# Patient Record
Sex: FEMALE | Race: WHITE | Employment: UNEMPLOYED | URBAN - METROPOLITAN AREA
[De-identification: names, ages, dates, MRNs, and addresses within clinical notes are randomized per-mention and may not be internally consistent; named-entity substitution may affect disease eponyms.]

---

## 2018-06-02 ENCOUNTER — HOSPITAL ENCOUNTER (EMERGENCY)
Age: 58
Discharge: HOME | End: 2018-06-02
Payer: COMMERCIAL

## 2018-06-02 ENCOUNTER — APPOINTMENT (OUTPATIENT)
Dept: GENERAL RADIOLOGY | Age: 58
End: 2018-06-02
Payer: COMMERCIAL

## 2018-06-02 VITALS
TEMPERATURE: 97.3 F | OXYGEN SATURATION: 98 % | WEIGHT: 100 LBS | HEART RATE: 94 BPM | DIASTOLIC BLOOD PRESSURE: 84 MMHG | SYSTOLIC BLOOD PRESSURE: 128 MMHG | RESPIRATION RATE: 18 BRPM

## 2018-06-02 DIAGNOSIS — S90.31XA CONTUSION OF RIGHT FOOT, INITIAL ENCOUNTER: Primary | ICD-10-CM

## 2018-06-02 PROCEDURE — 73630 X-RAY EXAM OF FOOT: CPT

## 2018-06-02 PROCEDURE — 99283 EMERGENCY DEPT VISIT LOW MDM: CPT

## 2018-06-02 RX ORDER — IBUPROFEN 400 MG/1
400 TABLET ORAL EVERY 6 HOURS PRN
Qty: 20 TABLET | Refills: 0 | Status: SHIPPED | OUTPATIENT
Start: 2018-06-02 | End: 2019-12-13

## 2018-06-02 ASSESSMENT — PAIN DESCRIPTION - LOCATION: LOCATION: FOOT

## 2018-06-02 ASSESSMENT — ENCOUNTER SYMPTOMS
VOMITING: 0
COUGH: 0
NAUSEA: 0
SHORTNESS OF BREATH: 0
DIARRHEA: 0
ABDOMINAL PAIN: 0

## 2018-06-02 ASSESSMENT — PAIN DESCRIPTION - ORIENTATION: ORIENTATION: RIGHT

## 2018-06-02 ASSESSMENT — PAIN SCALES - GENERAL: PAINLEVEL_OUTOF10: 5

## 2019-12-13 ENCOUNTER — HOSPITAL ENCOUNTER (EMERGENCY)
Age: 59
Discharge: HOME | End: 2019-12-13
Payer: COMMERCIAL

## 2019-12-13 ENCOUNTER — APPOINTMENT (OUTPATIENT)
Dept: CT IMAGING | Age: 59
End: 2019-12-13
Payer: COMMERCIAL

## 2019-12-13 VITALS
TEMPERATURE: 96.7 F | SYSTOLIC BLOOD PRESSURE: 201 MMHG | HEIGHT: 62 IN | HEART RATE: 78 BPM | DIASTOLIC BLOOD PRESSURE: 125 MMHG | RESPIRATION RATE: 20 BRPM | WEIGHT: 100 LBS | BODY MASS INDEX: 18.4 KG/M2 | OXYGEN SATURATION: 100 %

## 2019-12-13 DIAGNOSIS — S09.90XA CLOSED HEAD INJURY, INITIAL ENCOUNTER: Primary | ICD-10-CM

## 2019-12-13 DIAGNOSIS — S01.81XA FACIAL LACERATION, INITIAL ENCOUNTER: ICD-10-CM

## 2019-12-13 PROCEDURE — 72125 CT NECK SPINE W/O DYE: CPT

## 2019-12-13 PROCEDURE — 12013 RPR F/E/E/N/L/M 2.6-5.0 CM: CPT

## 2019-12-13 PROCEDURE — 99284 EMERGENCY DEPT VISIT MOD MDM: CPT

## 2019-12-13 PROCEDURE — 6370000000 HC RX 637 (ALT 250 FOR IP): Performed by: PERSONAL EMERGENCY RESPONSE ATTENDANT

## 2019-12-13 PROCEDURE — 70486 CT MAXILLOFACIAL W/O DYE: CPT

## 2019-12-13 PROCEDURE — 70450 CT HEAD/BRAIN W/O DYE: CPT

## 2019-12-13 RX ORDER — CEFAZOLIN SODIUM 2 G/50ML
2 SOLUTION INTRAVENOUS ONCE
Status: DISCONTINUED | OUTPATIENT
Start: 2019-12-13 | End: 2019-12-13

## 2019-12-13 RX ORDER — DIAPER,BRIEF,INFANT-TODD,DISP
EACH MISCELLANEOUS ONCE
Status: COMPLETED | OUTPATIENT
Start: 2019-12-13 | End: 2019-12-13

## 2019-12-13 RX ORDER — CEPHALEXIN 500 MG/1
1000 CAPSULE ORAL 2 TIMES DAILY
Qty: 28 CAPSULE | Refills: 0 | Status: SHIPPED | OUTPATIENT
Start: 2019-12-13 | End: 2019-12-20

## 2019-12-13 RX ADMIN — BACITRACIN ZINC 1 G: 500 OINTMENT TOPICAL at 02:41

## 2019-12-13 ASSESSMENT — ENCOUNTER SYMPTOMS
RHINORRHEA: 0
SORE THROAT: 0
SHORTNESS OF BREATH: 0
COLOR CHANGE: 0
BLOOD IN STOOL: 0
NAUSEA: 0
COUGH: 0
ABDOMINAL PAIN: 0
VOMITING: 0
DIARRHEA: 0

## 2019-12-20 ENCOUNTER — HOSPITAL ENCOUNTER (EMERGENCY)
Age: 59
Discharge: HOME | End: 2019-12-20
Payer: COMMERCIAL

## 2019-12-20 VITALS
RESPIRATION RATE: 19 BRPM | BODY MASS INDEX: 17.67 KG/M2 | WEIGHT: 90 LBS | HEIGHT: 60 IN | SYSTOLIC BLOOD PRESSURE: 178 MMHG | DIASTOLIC BLOOD PRESSURE: 97 MMHG | OXYGEN SATURATION: 99 % | TEMPERATURE: 98.1 F | HEART RATE: 90 BPM

## 2019-12-20 DIAGNOSIS — Z48.02 VISIT FOR SUTURE REMOVAL: Primary | ICD-10-CM

## 2019-12-20 PROCEDURE — 99281 EMR DPT VST MAYX REQ PHY/QHP: CPT

## 2019-12-20 ASSESSMENT — ENCOUNTER SYMPTOMS: BACK PAIN: 0

## 2021-02-06 ENCOUNTER — HOSPITAL ENCOUNTER (INPATIENT)
Age: 61
LOS: 4 days | Discharge: HOME HEALTH | DRG: 426 | End: 2021-02-10
Attending: INTERNAL MEDICINE | Admitting: INTERNAL MEDICINE
Payer: COMMERCIAL

## 2021-02-06 ENCOUNTER — APPOINTMENT (OUTPATIENT)
Dept: CT IMAGING | Age: 61
DRG: 426 | End: 2021-02-06
Payer: COMMERCIAL

## 2021-02-06 DIAGNOSIS — E87.1 HYPONATREMIA: Primary | ICD-10-CM

## 2021-02-06 DIAGNOSIS — E87.6 HYPOKALEMIA: ICD-10-CM

## 2021-02-06 DIAGNOSIS — R40.4 TRANSIENT ALTERATION OF AWARENESS: ICD-10-CM

## 2021-02-06 LAB
ALBUMIN SERPL-MCNC: 4 G/DL (ref 3.5–4.6)
ALP BLD-CCNC: 113 U/L (ref 40–130)
ALT SERPL-CCNC: 40 U/L (ref 0–33)
ANION GAP SERPL CALCULATED.3IONS-SCNC: 12 MEQ/L (ref 9–15)
APTT: 32.3 SEC (ref 24.4–36.8)
AST SERPL-CCNC: 72 U/L (ref 0–35)
BASOPHILS ABSOLUTE: 0 K/UL (ref 0–0.2)
BASOPHILS RELATIVE PERCENT: 0.3 %
BILIRUB SERPL-MCNC: 0.9 MG/DL (ref 0.2–0.7)
BILIRUBIN URINE: NEGATIVE
BLOOD, URINE: NEGATIVE
BUN BLDV-MCNC: 3 MG/DL (ref 8–23)
CALCIUM SERPL-MCNC: 9.3 MG/DL (ref 8.5–9.9)
CHLORIDE BLD-SCNC: 83 MEQ/L (ref 95–107)
CLARITY: CLEAR
CO2: 25 MEQ/L (ref 20–31)
COLOR: YELLOW
CREAT SERPL-MCNC: 0.37 MG/DL (ref 0.5–0.9)
EKG ATRIAL RATE: 82 BPM
EKG P AXIS: 69 DEGREES
EKG P-R INTERVAL: 148 MS
EKG Q-T INTERVAL: 404 MS
EKG QRS DURATION: 76 MS
EKG QTC CALCULATION (BAZETT): 472 MS
EKG R AXIS: 82 DEGREES
EKG T AXIS: 21 DEGREES
EKG VENTRICULAR RATE: 82 BPM
EOSINOPHILS ABSOLUTE: 0 K/UL (ref 0–0.7)
EOSINOPHILS RELATIVE PERCENT: 0.1 %
ETHANOL PERCENT: NORMAL G/DL
ETHANOL: <10 MG/DL (ref 0–0.08)
GFR AFRICAN AMERICAN: >60
GFR NON-AFRICAN AMERICAN: >60
GLOBULIN: 3.9 G/DL (ref 2.3–3.5)
GLUCOSE BLD-MCNC: 76 MG/DL (ref 70–99)
GLUCOSE URINE: NEGATIVE MG/DL
HCT VFR BLD CALC: 40.3 % (ref 37–47)
HEMOGLOBIN: 14.4 G/DL (ref 12–16)
INR BLD: 0.9
KETONES, URINE: 40 MG/DL
LACTIC ACID: 1 MMOL/L (ref 0.5–2.2)
LEUKOCYTE ESTERASE, URINE: NEGATIVE
LIPASE: 30 U/L (ref 12–95)
LYMPHOCYTES ABSOLUTE: 1.1 K/UL (ref 1–4.8)
LYMPHOCYTES RELATIVE PERCENT: 15.9 %
MAGNESIUM: 1.7 MG/DL (ref 1.7–2.4)
MCH RBC QN AUTO: 34.1 PG (ref 27–31.3)
MCHC RBC AUTO-ENTMCNC: 35.9 % (ref 33–37)
MCV RBC AUTO: 95.1 FL (ref 82–100)
MONOCYTES ABSOLUTE: 1 K/UL (ref 0.2–0.8)
MONOCYTES RELATIVE PERCENT: 14.3 %
NEUTROPHILS ABSOLUTE: 4.6 K/UL (ref 1.4–6.5)
NEUTROPHILS RELATIVE PERCENT: 69.4 %
NITRITE, URINE: NEGATIVE
PDW BLD-RTO: 12.7 % (ref 11.5–14.5)
PH UA: 7 (ref 5–9)
PLATELET # BLD: 372 K/UL (ref 130–400)
POC CREATININE WHOLE BLOOD: 0.4
POTASSIUM SERPL-SCNC: 3 MEQ/L (ref 3.4–4.9)
PROTEIN UA: ABNORMAL MG/DL
PROTHROMBIN TIME: 11.7 SEC (ref 12.3–14.9)
RBC # BLD: 4.24 M/UL (ref 4.2–5.4)
SODIUM BLD-SCNC: 120 MEQ/L (ref 135–144)
SPECIFIC GRAVITY UA: 1.01 (ref 1–1.03)
TOTAL PROTEIN: 7.9 G/DL (ref 6.3–8)
URINE REFLEX TO CULTURE: ABNORMAL
UROBILINOGEN, URINE: 0.2 E.U./DL
WBC # BLD: 6.6 K/UL (ref 4.8–10.8)

## 2021-02-06 PROCEDURE — 85730 THROMBOPLASTIN TIME PARTIAL: CPT

## 2021-02-06 PROCEDURE — 81003 URINALYSIS AUTO W/O SCOPE: CPT

## 2021-02-06 PROCEDURE — 6360000004 HC RX CONTRAST MEDICATION: Performed by: PHYSICIAN ASSISTANT

## 2021-02-06 PROCEDURE — 86900 BLOOD TYPING SEROLOGIC ABO: CPT

## 2021-02-06 PROCEDURE — 83605 ASSAY OF LACTIC ACID: CPT

## 2021-02-06 PROCEDURE — 99283 EMERGENCY DEPT VISIT LOW MDM: CPT

## 2021-02-06 PROCEDURE — C9113 INJ PANTOPRAZOLE SODIUM, VIA: HCPCS | Performed by: PHYSICIAN ASSISTANT

## 2021-02-06 PROCEDURE — 80053 COMPREHEN METABOLIC PANEL: CPT

## 2021-02-06 PROCEDURE — 82077 ASSAY SPEC XCP UR&BREATH IA: CPT

## 2021-02-06 PROCEDURE — 6370000000 HC RX 637 (ALT 250 FOR IP): Performed by: PHYSICIAN ASSISTANT

## 2021-02-06 PROCEDURE — 93005 ELECTROCARDIOGRAM TRACING: CPT | Performed by: PHYSICIAN ASSISTANT

## 2021-02-06 PROCEDURE — 2580000003 HC RX 258: Performed by: PHYSICIAN ASSISTANT

## 2021-02-06 PROCEDURE — 86901 BLOOD TYPING SEROLOGIC RH(D): CPT

## 2021-02-06 PROCEDURE — 83690 ASSAY OF LIPASE: CPT

## 2021-02-06 PROCEDURE — 1210000000 HC MED SURG R&B

## 2021-02-06 PROCEDURE — 74177 CT ABD & PELVIS W/CONTRAST: CPT

## 2021-02-06 PROCEDURE — 36415 COLL VENOUS BLD VENIPUNCTURE: CPT

## 2021-02-06 PROCEDURE — 85610 PROTHROMBIN TIME: CPT

## 2021-02-06 PROCEDURE — 86850 RBC ANTIBODY SCREEN: CPT

## 2021-02-06 PROCEDURE — 96374 THER/PROPH/DIAG INJ IV PUSH: CPT

## 2021-02-06 PROCEDURE — 6360000002 HC RX W HCPCS: Performed by: PHYSICIAN ASSISTANT

## 2021-02-06 PROCEDURE — 85025 COMPLETE CBC W/AUTO DIFF WBC: CPT

## 2021-02-06 PROCEDURE — 83735 ASSAY OF MAGNESIUM: CPT

## 2021-02-06 RX ORDER — 0.9 % SODIUM CHLORIDE 0.9 %
1000 INTRAVENOUS SOLUTION INTRAVENOUS ONCE
Status: COMPLETED | OUTPATIENT
Start: 2021-02-06 | End: 2021-02-06

## 2021-02-06 RX ORDER — PANTOPRAZOLE SODIUM 40 MG/10ML
40 INJECTION, POWDER, LYOPHILIZED, FOR SOLUTION INTRAVENOUS ONCE
Status: COMPLETED | OUTPATIENT
Start: 2021-02-06 | End: 2021-02-06

## 2021-02-06 RX ADMIN — IOPAMIDOL 100 ML: 612 INJECTION, SOLUTION INTRAVENOUS at 22:35

## 2021-02-06 RX ADMIN — SODIUM CHLORIDE 1000 ML: 9 INJECTION, SOLUTION INTRAVENOUS at 22:02

## 2021-02-06 RX ADMIN — PANTOPRAZOLE SODIUM 40 MG: 40 INJECTION, POWDER, FOR SOLUTION INTRAVENOUS at 22:02

## 2021-02-06 RX ADMIN — POTASSIUM BICARBONATE 40 MEQ: 782 TABLET, EFFERVESCENT ORAL at 23:45

## 2021-02-06 ASSESSMENT — ENCOUNTER SYMPTOMS
VOMITING: 0
ALLERGIC/IMMUNOLOGIC NEGATIVE: 1
COLOR CHANGE: 0
ABDOMINAL PAIN: 0
BLOOD IN STOOL: 1
NAUSEA: 0
EYE PAIN: 0
TROUBLE SWALLOWING: 0
SHORTNESS OF BREATH: 0
APNEA: 0

## 2021-02-07 ENCOUNTER — APPOINTMENT (OUTPATIENT)
Dept: GENERAL RADIOLOGY | Age: 61
DRG: 426 | End: 2021-02-07
Payer: COMMERCIAL

## 2021-02-07 ENCOUNTER — APPOINTMENT (OUTPATIENT)
Dept: ULTRASOUND IMAGING | Age: 61
DRG: 426 | End: 2021-02-07
Payer: COMMERCIAL

## 2021-02-07 PROBLEM — R74.8 ELEVATED LIVER ENZYMES: Status: ACTIVE | Noted: 2021-02-07

## 2021-02-07 PROBLEM — I10 HTN (HYPERTENSION): Status: ACTIVE | Noted: 2021-02-07

## 2021-02-07 PROBLEM — E87.6 HYPOKALEMIA: Status: ACTIVE | Noted: 2021-02-07

## 2021-02-07 PROBLEM — Z78.9 ALCOHOL USE: Status: ACTIVE | Noted: 2021-02-07

## 2021-02-07 PROBLEM — F10.90 ALCOHOL USE: Status: ACTIVE | Noted: 2021-02-07

## 2021-02-07 LAB
ABO/RH: NORMAL
ALBUMIN SERPL-MCNC: 3.5 G/DL (ref 3.5–4.6)
ALP BLD-CCNC: 94 U/L (ref 40–130)
ALT SERPL-CCNC: 33 U/L (ref 0–33)
ANION GAP SERPL CALCULATED.3IONS-SCNC: 12 MEQ/L (ref 9–15)
ANION GAP SERPL CALCULATED.3IONS-SCNC: 7 MEQ/L (ref 9–15)
ANION GAP SERPL CALCULATED.3IONS-SCNC: 9 MEQ/L (ref 9–15)
ANTIBODY SCREEN: NORMAL
AST SERPL-CCNC: 55 U/L (ref 0–35)
BILIRUB SERPL-MCNC: 0.8 MG/DL (ref 0.2–0.7)
BUN BLDV-MCNC: 4 MG/DL (ref 8–23)
BUN BLDV-MCNC: 6 MG/DL (ref 8–23)
BUN BLDV-MCNC: 6 MG/DL (ref 8–23)
CALCIUM SERPL-MCNC: 8.3 MG/DL (ref 8.5–9.9)
CALCIUM SERPL-MCNC: 8.5 MG/DL (ref 8.5–9.9)
CALCIUM SERPL-MCNC: 8.8 MG/DL (ref 8.5–9.9)
CHLORIDE BLD-SCNC: 92 MEQ/L (ref 95–107)
CHLORIDE BLD-SCNC: 93 MEQ/L (ref 95–107)
CHLORIDE BLD-SCNC: 95 MEQ/L (ref 95–107)
CHP ED QC CHECK: YES
CO2: 22 MEQ/L (ref 20–31)
CO2: 24 MEQ/L (ref 20–31)
CO2: 24 MEQ/L (ref 20–31)
CREAT SERPL-MCNC: 0.38 MG/DL (ref 0.5–0.9)
CREAT SERPL-MCNC: 0.4 MG/DL (ref 0.5–0.9)
CREAT SERPL-MCNC: 0.66 MG/DL (ref 0.5–0.9)
CREATININE URINE: 53.8 MG/DL
CREATININE URINE: 82.6 MG/DL
GFR AFRICAN AMERICAN: >60
GFR NON-AFRICAN AMERICAN: >60
GLOBULIN: 3.5 G/DL (ref 2.3–3.5)
GLUCOSE BLD-MCNC: 111 MG/DL (ref 70–99)
GLUCOSE BLD-MCNC: 70 MG/DL (ref 70–99)
GLUCOSE BLD-MCNC: 74 MG/DL
GLUCOSE BLD-MCNC: 74 MG/DL (ref 60–115)
GLUCOSE BLD-MCNC: 99 MG/DL (ref 70–99)
HAV IGM SER IA-ACNC: NORMAL
HEPATITIS B CORE IGM ANTIBODY: NORMAL
HEPATITIS B SURFACE ANTIGEN INTERPRETATION: NORMAL
HEPATITIS C ANTIBODY INTERPRETATION: NORMAL
HEPATITIS INTERPRETATION:: NORMAL
OSMOLALITY URINE: 381 MOSM/KG (ref 300–900)
PERFORMED ON: ABNORMAL
PERFORMED ON: NORMAL
POC CREATININE: 0.4 MG/DL (ref 0.6–1.2)
POC SAMPLE TYPE: ABNORMAL
POTASSIUM REFLEX MAGNESIUM: 3.8 MEQ/L (ref 3.4–4.9)
POTASSIUM SERPL-SCNC: 3.4 MEQ/L (ref 3.4–4.9)
POTASSIUM SERPL-SCNC: 3.8 MEQ/L (ref 3.4–4.9)
SARS-COV-2, NAAT: NOT DETECTED
SODIUM BLD-SCNC: 126 MEQ/L (ref 135–144)
SODIUM URINE: 41 MEQ/L
SODIUM URINE: 51 MEQ/L
TOTAL PROTEIN: 7 G/DL (ref 6.3–8)

## 2021-02-07 PROCEDURE — 82542 COL CHROMOTOGRAPHY QUAL/QUAN: CPT

## 2021-02-07 PROCEDURE — 2580000003 HC RX 258

## 2021-02-07 PROCEDURE — 1210000000 HC MED SURG R&B

## 2021-02-07 PROCEDURE — 6360000002 HC RX W HCPCS: Performed by: NURSE PRACTITIONER

## 2021-02-07 PROCEDURE — 82570 ASSAY OF URINE CREATININE: CPT

## 2021-02-07 PROCEDURE — 36415 COLL VENOUS BLD VENIPUNCTURE: CPT

## 2021-02-07 PROCEDURE — 71045 X-RAY EXAM CHEST 1 VIEW: CPT

## 2021-02-07 PROCEDURE — 83935 ASSAY OF URINE OSMOLALITY: CPT

## 2021-02-07 PROCEDURE — 80074 ACUTE HEPATITIS PANEL: CPT

## 2021-02-07 PROCEDURE — 80053 COMPREHEN METABOLIC PANEL: CPT

## 2021-02-07 PROCEDURE — U0002 COVID-19 LAB TEST NON-CDC: HCPCS

## 2021-02-07 PROCEDURE — 2580000003 HC RX 258: Performed by: NURSE PRACTITIONER

## 2021-02-07 PROCEDURE — 84300 ASSAY OF URINE SODIUM: CPT

## 2021-02-07 PROCEDURE — 6370000000 HC RX 637 (ALT 250 FOR IP): Performed by: NURSE PRACTITIONER

## 2021-02-07 PROCEDURE — 76705 ECHO EXAM OF ABDOMEN: CPT

## 2021-02-07 RX ORDER — LORAZEPAM 1 MG/1
3 TABLET ORAL
Status: DISCONTINUED | OUTPATIENT
Start: 2021-02-07 | End: 2021-02-10 | Stop reason: HOSPADM

## 2021-02-07 RX ORDER — LORAZEPAM 2 MG/ML
3 INJECTION INTRAMUSCULAR
Status: DISCONTINUED | OUTPATIENT
Start: 2021-02-07 | End: 2021-02-10 | Stop reason: HOSPADM

## 2021-02-07 RX ORDER — SODIUM CHLORIDE 0.9 % (FLUSH) 0.9 %
10 SYRINGE (ML) INJECTION EVERY 12 HOURS SCHEDULED
Status: DISCONTINUED | OUTPATIENT
Start: 2021-02-07 | End: 2021-02-10 | Stop reason: HOSPADM

## 2021-02-07 RX ORDER — LORAZEPAM 2 MG/ML
4 INJECTION INTRAMUSCULAR
Status: DISCONTINUED | OUTPATIENT
Start: 2021-02-07 | End: 2021-02-10 | Stop reason: HOSPADM

## 2021-02-07 RX ORDER — AMMONIUM LACTATE 12 G/100G
LOTION TOPICAL PRN
COMMUNITY

## 2021-02-07 RX ORDER — ACETAMINOPHEN 325 MG/1
650 TABLET ORAL EVERY 6 HOURS PRN
Status: DISCONTINUED | OUTPATIENT
Start: 2021-02-07 | End: 2021-02-10 | Stop reason: HOSPADM

## 2021-02-07 RX ORDER — PROMETHAZINE HYDROCHLORIDE 12.5 MG/1
12.5 TABLET ORAL EVERY 6 HOURS PRN
Status: DISCONTINUED | OUTPATIENT
Start: 2021-02-07 | End: 2021-02-10 | Stop reason: HOSPADM

## 2021-02-07 RX ORDER — LANOLIN ALCOHOL/MO/W.PET/CERES
100 CREAM (GRAM) TOPICAL DAILY
Status: DISCONTINUED | OUTPATIENT
Start: 2021-02-07 | End: 2021-02-10 | Stop reason: HOSPADM

## 2021-02-07 RX ORDER — SODIUM CHLORIDE 9 MG/ML
INJECTION, SOLUTION INTRAVENOUS CONTINUOUS
Status: DISCONTINUED | OUTPATIENT
Start: 2021-02-07 | End: 2021-02-08

## 2021-02-07 RX ORDER — FOLIC ACID 1 MG/1
1 TABLET ORAL DAILY
Status: DISCONTINUED | OUTPATIENT
Start: 2021-02-07 | End: 2021-02-10 | Stop reason: HOSPADM

## 2021-02-07 RX ORDER — LORAZEPAM 2 MG/ML
1 INJECTION INTRAMUSCULAR
Status: DISCONTINUED | OUTPATIENT
Start: 2021-02-07 | End: 2021-02-10 | Stop reason: HOSPADM

## 2021-02-07 RX ORDER — LORAZEPAM 1 MG/1
1 TABLET ORAL
Status: DISCONTINUED | OUTPATIENT
Start: 2021-02-07 | End: 2021-02-10 | Stop reason: HOSPADM

## 2021-02-07 RX ORDER — LORAZEPAM 1 MG/1
4 TABLET ORAL
Status: DISCONTINUED | OUTPATIENT
Start: 2021-02-07 | End: 2021-02-10 | Stop reason: HOSPADM

## 2021-02-07 RX ORDER — MULTIVITAMIN WITH IRON
1 TABLET ORAL DAILY
Status: DISCONTINUED | OUTPATIENT
Start: 2021-02-07 | End: 2021-02-10 | Stop reason: HOSPADM

## 2021-02-07 RX ORDER — SODIUM CHLORIDE 0.9 % (FLUSH) 0.9 %
10 SYRINGE (ML) INJECTION PRN
Status: DISCONTINUED | OUTPATIENT
Start: 2021-02-07 | End: 2021-02-10 | Stop reason: HOSPADM

## 2021-02-07 RX ORDER — AMLODIPINE BESYLATE 2.5 MG/1
2.5 TABLET ORAL DAILY
Status: ON HOLD | COMMUNITY
End: 2021-03-15 | Stop reason: HOSPADM

## 2021-02-07 RX ORDER — AMLODIPINE BESYLATE 5 MG/1
5 TABLET ORAL DAILY
Status: DISCONTINUED | OUTPATIENT
Start: 2021-02-07 | End: 2021-02-10 | Stop reason: HOSPADM

## 2021-02-07 RX ORDER — ACETAMINOPHEN 650 MG/1
650 SUPPOSITORY RECTAL EVERY 6 HOURS PRN
Status: DISCONTINUED | OUTPATIENT
Start: 2021-02-07 | End: 2021-02-10 | Stop reason: HOSPADM

## 2021-02-07 RX ORDER — LORAZEPAM 2 MG/ML
2 INJECTION INTRAMUSCULAR
Status: DISCONTINUED | OUTPATIENT
Start: 2021-02-07 | End: 2021-02-10 | Stop reason: HOSPADM

## 2021-02-07 RX ORDER — POTASSIUM CHLORIDE 7.45 MG/ML
10 INJECTION INTRAVENOUS
Status: COMPLETED | OUTPATIENT
Start: 2021-02-07 | End: 2021-02-07

## 2021-02-07 RX ORDER — ONDANSETRON 2 MG/ML
4 INJECTION INTRAMUSCULAR; INTRAVENOUS EVERY 6 HOURS PRN
Status: DISCONTINUED | OUTPATIENT
Start: 2021-02-07 | End: 2021-02-10 | Stop reason: HOSPADM

## 2021-02-07 RX ORDER — LORAZEPAM 1 MG/1
2 TABLET ORAL
Status: DISCONTINUED | OUTPATIENT
Start: 2021-02-07 | End: 2021-02-10 | Stop reason: HOSPADM

## 2021-02-07 RX ORDER — SODIUM CHLORIDE 9 MG/ML
INJECTION, SOLUTION INTRAVENOUS
Status: COMPLETED
Start: 2021-02-07 | End: 2021-02-07

## 2021-02-07 RX ADMIN — SODIUM CHLORIDE 1000 ML: 9 INJECTION, SOLUTION INTRAVENOUS at 13:25

## 2021-02-07 RX ADMIN — POTASSIUM CHLORIDE 10 MEQ: 7.46 INJECTION, SOLUTION INTRAVENOUS at 04:00

## 2021-02-07 RX ADMIN — THERA TABS 1 TABLET: TAB at 13:23

## 2021-02-07 RX ADMIN — POTASSIUM CHLORIDE 10 MEQ: 7.46 INJECTION, SOLUTION INTRAVENOUS at 02:57

## 2021-02-07 RX ADMIN — AMLODIPINE BESYLATE 5 MG: 5 TABLET ORAL at 13:23

## 2021-02-07 RX ADMIN — ENOXAPARIN SODIUM 40 MG: 40 INJECTION SUBCUTANEOUS at 13:23

## 2021-02-07 RX ADMIN — POTASSIUM CHLORIDE 10 MEQ: 7.46 INJECTION, SOLUTION INTRAVENOUS at 05:01

## 2021-02-07 RX ADMIN — Medication 100 MG: at 13:23

## 2021-02-07 RX ADMIN — POTASSIUM CHLORIDE 10 MEQ: 7.46 INJECTION, SOLUTION INTRAVENOUS at 06:08

## 2021-02-07 RX ADMIN — SODIUM CHLORIDE, PRESERVATIVE FREE 10 ML: 5 INJECTION INTRAVENOUS at 13:23

## 2021-02-07 RX ADMIN — FOLIC ACID 1 MG: 1 TABLET ORAL at 13:22

## 2021-02-07 ASSESSMENT — ENCOUNTER SYMPTOMS
ABDOMINAL PAIN: 0
VOMITING: 0
NAUSEA: 0
DIARRHEA: 0
WHEEZING: 0
SORE THROAT: 0
BLOOD IN STOOL: 1
SHORTNESS OF BREATH: 0
TROUBLE SWALLOWING: 0

## 2021-02-07 ASSESSMENT — PAIN SCALES - GENERAL: PAINLEVEL_OUTOF10: 0

## 2021-02-07 NOTE — CONSULTS
Renal consult dictated    Hyponatremia SIADH  Suspicious with hx Cervical cancer requiring chemo/radiation 23 years ago    Hypertension  Malnourished  Alcohol abuse with liver enzymes noted    Plan Saline to be started  Fluid restriction urine osm/Na+  TSH  Rp PTH r/o tumor  Gyn exam

## 2021-02-07 NOTE — ED NOTES
Pt was given food at her request and is eating and drinking. Pt was given full meal and x2 drinks.       Puneet Mcpherson RN  02/07/21 9092

## 2021-02-07 NOTE — ED TRIAGE NOTES
Patient arrived from home with c/o generalized weakness and rectal bleeding x 1 week. Patient denies taking blood thinners. Denies any pain. Patient states she has decreased appetite. Patient has history of cervical cancer. Vitals are stable.

## 2021-02-07 NOTE — CARE COORDINATION
[facility] Case Management Initial Discharge Assessment    Met with Patient to discuss discharge plan. PCP: No primary care provider on file. Date of Last Visit: one week ago    If no PCP, list provided? N/A    Discharge Planning    Living Arrangements: independently at home    Who do you live with? Boyfriend    Who helps you with your care:  family    If lives at home:     Do you have any barriers navigating in your home? no    Patient can perform ADL? Yes    Current Services (outpatient and in home) :  None    Dialysis: No    Is transportation available to get to your appointments? Yes, boyfriend or daughter    DME Equipment:  no    Respiratory equipment: None    Respiratory provider:  no     Pharmacy:  yes - [facility] on [address] with Medication Assistance Program?  No      Patient agreeable to [facility]? Yes, Company if needed [facility]    Patient agreeable to SNF/Rehab? No    Other discharge needs identified? N/A    Freedom of choice list provided with basic dialogue that supports the patient's individualized plan of care/goals and shares the quality data associated with the providers. Yes    Does Patient Have a High-Risk for Readmission Diagnosis (CHF, PN, MI, COPD)? No    If Yes,     Consult with pulmonologist? N/A   Consult with cardiologist? N/A   Cardiac Rehab referral if EF <35%? N/A   Consult with Pharmacy for medication assessment prior to discharge? N/A   Consult with Behavioral health to aid in depression, anxiety, or coping issues? N/A   Palliative Care Consult? N/A   Pulmonary Rehab order for COPD, PN, and CHF (if EF > 35%)? N/A    Does patient have a reliable scale and know how to read it (for CHF)? N/A   Nutrition consult for CHF? N/A   Respiratory therapy consult that includes bedside instruction on administration of nebulizers and/or inhalers, and assessment of oxygen and equipment needs in the home?  N/A    The plan for Transition of Care is related to the following treatment goals: to home with boyfriend and possibly Chacha Rao. Initial Discharge Plan? (Note: please see concurrent daily documentation for any updates after initial note). FROM HOME WITH BOYFRIEND. PLANS TO RETURN HOME. FAMILY IS SUPPORTIVE. PT SEEMS A LITTLE CONFUSED BUT CAN ANSWER QUESTIONS APPROPRIATELY.      The Patient and/or patient representative: PT was provided with choice of any post-acute providers for care and equipment and agrees with discharge plan  Yes    Electronically signed by ANA PAULA Bro on 2/7/2021 at 10:17 AM

## 2021-02-07 NOTE — CONSULTS
[address]                      [address], [address] [facility]                                  CONSULTATION    PATIENT NAME: Ada Hedrick                      :        1960  MED REC NO:   [id number]                            ROOM:       N223  ACCOUNT NO:   [de-identified]                           ADMIT DATE: 2021  PROVIDER:     Yaya Monreal DO    CONSULT DATE:  2021    HISTORY OF PRESENT ILLNESS:  A 60-year-old frail-appearing female  admitted to the hospital through the emergency room complaining  initially of a lower GI bleed, however, not documented. The patient was  quite lethargic on her admission to the hospital.  Daughter in the  emergency room stated the patient has been having intermittent  confusion. There is no history of nausea, vomiting, or diarrhea per  patient. She is being seen for hyponatremia, does have a history of  cervical cancer for which she was treated with chemotherapy and  radiation per her history 23 years ago. She has had some weight loss in  the past year. The patient does have a history of alcohol use, liver normal liver with CT scan  enzymes in the past being elevated, and has been treated for  hypertension with Norvasc therapy. No diuretics per the patient. She  denies any nonsteroidal anti-inflammatory medication use. PAST MEDICAL HISTORY:  Hypertension, alcohol use. PAST SURGICAL HISTORY:  Denies, however, she says that a tumor was  removed from her abdomen 23 years ago. FAMILY HISTORY:  Noncontributory. MEDICATIONS:  At the time of her admission, Norvasc, [place]-Hydrin. ALLERGIES TO MEDICATIONS:  ZANTAC. REVIEW OF SYSTEMS:  Noncontributory. PHYSICAL EXAMINATION:  VITAL SIGNS:  Height 5 feet 2 inches, 80 pounds, blood pressure 145/90,  heart rate 70, respirations 16, afebrile. HEENT:  Normocephalic. Pupils equal and react to light. Facial muscles  appear to be atrophic. NECK:  Supple.   No

## 2021-02-07 NOTE — PROGRESS NOTES
Agree with plan as documented in my colleagues note from today. Patient was seen by nephrologist.  Appreciate help. Resume fluid as per nephrologist.  Recheck sodium in the morning.

## 2021-02-07 NOTE — ED NOTES
Pt asked to be taken to restroom, pt was taken via w/c to restroom and pts daughter stayed with her, pt delay in care as pts stays in restroom for a long period of time.       Ginny Vásquez RN  02/06/21 6265

## 2021-02-07 NOTE — ED NOTES
Pt ate all her meal that she requested. Noted pt has no needs at this time. Pt was advised we were awaiting room assignment.       Joann May RN  02/07/21 3618

## 2021-02-07 NOTE — H&P
[facility] MEDICINE    HISTORY AND PHYSICAL EXAM    PATIENT NAME:  Mathew Emerson    MRN:  [id number]  SERVICE DATE:  2/7/2021   SERVICE TIME:  1:29 AM    Primary Care Physician: No primary care provider on file. SUBJECTIVE  CHIEF COMPLAINT:  Weakness and fatigue. HPI:  Mathew Emerson is a 61 y.o., , female who  has a past medical history of Cancer ([facility] and [facility] that is hospitalized for hyponatremia, weakness. Her complaint is generalized weakness. She states she had some rectal bleeding in the past week that has resolved. She has not fallen or had syncopal episodes. She feels confused at times. She states she overall just does not feel well. She denies fever, chills, abdominal pain, dizziness and HA. No chest pain or respiratory complaints. PAST MEDICAL HISTORY:    Past Medical History:   Diagnosis Date    Cancer ([facility]     Cervical cancer ([facility]      PAST SURGICAL HISTORY:  History reviewed. No pertinent surgical history. FAMILY HISTORY:  History reviewed. No pertinent family history.   SOCIAL HISTORY:    Social History     Socioeconomic History    Marital status: Single     Spouse name: Not on file    Number of children: Not on file    Years of education: Not on file    Highest education level: Not on file   Occupational History    Not on file   Social Needs    Financial resource strain: Not on file    Food insecurity     Worry: Not on file     Inability: Not on file    Transportation needs     Medical: Not on file     Non-medical: Not on file   Tobacco Use    Smoking status: Current Every Day Smoker    Smokeless tobacco: Never Used   Substance and Sexual Activity    Alcohol use: Yes     Comment: occassional    Drug use: No    Sexual activity: Not on file   Lifestyle    Physical activity     Days per week: Not on file     Minutes per session: Not on file    Stress: Not on file   Relationships    Social connections     Talks on phone: Not on file     Gets together: Not on file     Attends Lutheran service: Not on file     Active member of club or organization: Not on file     Attends meetings of clubs or organizations: Not on file     Relationship status: Not on file    Intimate partner violence     Fear of current or ex partner: Not on file     Emotionally abused: Not on file     Physically abused: Not on file     Forced sexual activity: Not on file   Other Topics Concern    Not on file   Social History Narrative    Not on file     MEDICATIONS:   Prior to Admission medications    Not on File       ALLERGIES: Zantac [ranitidine hcl]    REVIEW OF SYSTEM:   Review of Systems   Constitutional: Positive for appetite change and fatigue. Negative for chills and fever. HENT: Positive for congestion. Negative for sore throat and trouble swallowing. Eyes: Negative for visual disturbance. Respiratory: Negative for shortness of breath and wheezing. Cardiovascular: Negative for chest pain, palpitations and leg swelling. Gastrointestinal: Positive for blood in stool. Negative for abdominal pain, diarrhea, nausea and vomiting. Genitourinary: Negative for flank pain and hematuria. Musculoskeletal: Negative for gait problem. Skin: Negative for rash. Neurological: Positive for weakness. Negative for seizures, syncope and headaches. Psychiatric/Behavioral: Positive for confusion. The patient is not nervous/anxious. OBJECTIVE  PHYSICAL EXAM:   Physical Exam  Constitutional:       General: She is not in acute distress. Appearance: She is ill-appearing. Comments: Thin ,  frail   HENT:      Head: Normocephalic. Nose: Nose normal.      Mouth/Throat:      Mouth: Mucous membranes are moist.   Eyes:      Conjunctiva/sclera: Conjunctivae normal.      Pupils: Pupils are equal, round, and reactive to light. Neck:      Musculoskeletal: No muscular tenderness. Vascular: No carotid bruit.    Cardiovascular:      Rate and Rhythm: Normal rate and regular rhythm. Pulses: Normal pulses. Heart sounds: No murmur. Pulmonary:      Effort: Pulmonary effort is normal. No respiratory distress. Breath sounds: No wheezing. Abdominal:      General: Abdomen is flat. Palpations: Abdomen is soft. Tenderness: There is no abdominal tenderness. Musculoskeletal:      Right lower leg: No edema. Left lower leg: No edema. Lymphadenopathy:      Cervical: No cervical adenopathy. Skin:     General: Skin is warm and dry. Capillary Refill: Capillary refill takes less than 2 seconds. Neurological:      Mental Status: She is alert and oriented to person, place, and time. Psychiatric:         Mood and Affect: Mood normal.         Behavior: Behavior normal.      Comments: Difficulty focusing. BP (!) 176/99   Pulse 83   Temp 98.4 °F (36.9 °C) (Temporal)   Resp 18   Ht 5' (1.524 m)   Wt 80 lb (36.3 kg)   LMP  (LMP Unknown)   SpO2 100%   BMI 15.62 kg/m²     DATA:     Diagnostic tests reviewed for today's visit:    Most recent labs and imaging results reviewed.      LABS:    Recent Results (from the past 24 hour(s))   Comprehensive Metabolic Panel    Collection Time: 02/06/21  9:00 PM   Result Value Ref Range    Sodium 120 (LL) 135 - 144 mEq/L    Potassium 3.0 (LL) 3.4 - 4.9 mEq/L    Chloride 83 (L) 95 - 107 mEq/L    CO2 25 20 - 31 mEq/L    Anion Gap 12 9 - 15 mEq/L    Glucose 76 70 - 99 mg/dL    BUN 3 (L) 8 - 23 mg/dL    CREATININE 0.37 (L) 0.50 - 0.90 mg/dL    GFR Non-African American >60.0 >60    GFR  >60.0 >60    Calcium 9.3 8.5 - 9.9 mg/dL    Total Protein 7.9 6.3 - 8.0 g/dL    Albumin 4.0 3.5 - 4.6 g/dL    Total Bilirubin 0.9 (H) 0.2 - 0.7 mg/dL    Alkaline Phosphatase 113 40 - 130 U/L    ALT 40 (H) 0 - 33 U/L    AST 72 (H) 0 - 35 U/L    Globulin 3.9 (H) 2.3 - 3.5 g/dL   CBC Auto Differential    Collection Time: 02/06/21  9:00 PM   Result Value Ref Range    WBC 6.6 4.8 - 10.8 K/uL    RBC 4. 24 4.20 - 5.40 M/uL    Hemoglobin 14.4 12.0 - 16.0 g/dL    Hematocrit 40.3 37.0 - 47.0 %    MCV 95.1 82.0 - 100.0 fL    MCH 34.1 (H) 27.0 - 31.3 pg    MCHC 35.9 33.0 - 37.0 %    RDW 12.7 11.5 - 14.5 %    Platelets 344 742 - 057 K/uL    Neutrophils % 69.4 %    Lymphocytes % 15.9 %    Monocytes % 14.3 %    Eosinophils % 0.1 %    Basophils % 0.3 %    Neutrophils Absolute 4.6 1.4 - 6.5 K/uL    Lymphocytes Absolute 1.1 1.0 - 4.8 K/uL    Monocytes Absolute 1.0 (H) 0.2 - 0.8 K/uL    Eosinophils Absolute 0.0 0.0 - 0.7 K/uL    Basophils Absolute 0.0 0.0 - 0.2 K/uL   Urine Reflex to Culture    Collection Time: 02/06/21  9:00 PM    Specimen: Urine, clean catch   Result Value Ref Range    Color, UA Yellow Straw/Yellow    Clarity, UA Clear Clear    Glucose, Ur Negative Negative mg/dL    Bilirubin Urine Negative Negative    Ketones, Urine 40 (A) Negative mg/dL    Specific Gravity, UA 1.010 1.005 - 1.030    Blood, Urine Negative Negative    pH, UA 7.0 5.0 - 9.0    Protein, UA TRACE (A) Negative mg/dL    Urobilinogen, Urine 0.2 <2.0 E.U./dL    Nitrite, Urine Negative Negative    Leukocyte Esterase, Urine Negative Negative    Urine Reflex to Culture Not Indicated    Lipase    Collection Time: 02/06/21  9:00 PM   Result Value Ref Range    Lipase 30 12 - 95 U/L   Lactic Acid, Plasma    Collection Time: 02/06/21  9:00 PM   Result Value Ref Range    Lactic Acid 1.0 0.5 - 2.2 mmol/L   Protime-INR    Collection Time: 02/06/21  9:00 PM   Result Value Ref Range    Protime 11.7 (L) 12.3 - 14.9 sec    INR 0.9    APTT    Collection Time: 02/06/21  9:00 PM   Result Value Ref Range    aPTT 32.3 24.4 - 36.8 sec   TYPE AND SCREEN    Collection Time: 02/06/21  9:00 PM   Result Value Ref Range    ABO/Rh O POS     Antibody Screen NEG    Magnesium    Collection Time: 02/06/21  9:00 PM   Result Value Ref Range    Magnesium 1.7 1.7 - 2.4 mg/dL   Ethanol    Collection Time: 02/06/21  9:00 PM   Result Value Ref Range    Ethanol Lvl <10 mg/dL    Ethanol percent Not indicated G/dL   POCT CREATININE    Collection Time: 02/06/21 10:15 PM   Result Value Ref Range    POC CREATININE WHOLE BLOOD 0.4    POCT Venous    Collection Time: 02/06/21 10:15 PM   Result Value Ref Range    POC Creatinine 0.4 (L) 0.6 - 1.2 mg/dL    GFR Non-African American >60 >60    GFR  >60 >60    Sample Type FAY     Performed on SEE BELOW    EKG 12 Lead - Chest Pain    Collection Time: 02/06/21 11:43 PM   Result Value Ref Range    Ventricular Rate 82 BPM    Atrial Rate 82 BPM    P-R Interval 148 ms    QRS Duration 76 ms    Q-T Interval 404 ms    QTc Calculation (Bazett) 472 ms    P Axis 69 degrees    R Axis 82 degrees    T Axis 21 degrees   POCT Glucose    Collection Time: 02/07/21 12:01 AM   Result Value Ref Range    POC Glucose 74 60 - 115 mg/dl    Performed on ACCU-CHEK    COVID-19    Collection Time: 02/07/21 12:03 AM   Result Value Ref Range    SARS-CoV-2, NAAT Not Detected Not Detected   POCT Glucose    Collection Time: 02/07/21 12:03 AM   Result Value Ref Range    Glucose 74 mg/dL    QC OK? yes        IMAGING:  No results found. VTE Prophylaxis: low molecular weight heparin -  start    ASSESSMENT AND PLAN  Principal Problem:    Hyponatremia   Na 120    Hypokalemic as well. Daily beer drinker. No hx renal impairment per her. Has weakness, confusion, generalized malaise. Plan: admit  Restrict fluids   Consult nephrology. Urine Na, osmolality. Check  q 6 hr BNP        Hypokalemia   K 3.0  Received 40 Effer K in ED   Has muscle weakness,   No cramping or tetany. EKG is NSR  Normal EKG. Plan: Give 20 mEQ IVPB  q 6 hr. BNP       HTN    Systolics 370L - 549'I   She states she was prescribed BP meds but has not picked up prescription. No knowledge what medication. She has no HA, blurred vision, CP, edema or dizziness. Normal heart sounds   Good pulses. Plan: amlodipine 5 mg daily. Alcohol use  Daily beer drinker,    Frail. Poor appetite.   Mental confusion   Plan: [facility] protocol     re: cessation       Elevated liver enzymes   AST  72  ALT 40  Bili 0.9      Daily beer consumption. recent rectal bleeding - guaiac now negative  H/H 14 / 40  May be related to ETOH. She does complain of decreased appetite, no abdominal pain. Plan: US liver,  Hepatitis panel   ETOH cessation.         Plan of care discussed with: patient    SIGNATURE: Herman ReneeELSA overton - CNP  DATE: February 7, 2021  TIME: 1:29 AM   Bhakti Heard MD  - supervising physician

## 2021-02-07 NOTE — ED PROVIDER NOTES
[address] [facility] ED  eMERGENCYdEPARTMENT eNCOUnter      Pt Name: Joel Oneal  MRN: [id number]  [place]ag[address] 1960  Date of evaluation: 2/6/2021  Provider:Steve Duffy PA-C    CHIEF COMPLAINT       Chief Complaint   Patient presents with    Fatigue      Rectal bleeding x 1 week, not eating. HISTORY OF PRESENT ILLNESS  (Location/Symptom, Timing/Onset, Context/Setting, Quality, Duration, Modifying Factors, Severity.)   Joel Oneal is a 61 y.o. female who presents to the emergency department with complaints of generalized weakness and fatigue as well as rectal bleeding. Generalized weakness is been ongoing for the past week. Original complaint to triage was that she has had rectal bleeding over the past week however patient does admit that is been ongoing for the past 3 to 4 weeks. Patient states that she saw her doctor 2 to 3 days ago and was put on \"some pill\" and has not had bleeding since then. Denies any nausea or vomiting. Patient denies any abdominal pain. Patient denies any chest pain or shortness of breath. No headaches or dizziness. HPI    Nursing Notes were reviewed and I agree. REVIEW OF SYSTEMS    (2-9 systems for level 4, 10 or more for level 5)     Review of Systems   Constitutional: Positive for fatigue. Negative for diaphoresis and fever. HENT: Negative for hearing loss and trouble swallowing. Eyes: Negative for pain. Respiratory: Negative for apnea and shortness of breath. Cardiovascular: Negative for chest pain. Gastrointestinal: Positive for blood in stool. Negative for abdominal pain, nausea and vomiting. Endocrine: Negative. Genitourinary: Negative for hematuria. Musculoskeletal: Negative for neck pain and neck stiffness. Skin: Negative for color change. Allergic/Immunologic: Negative. Neurological: Positive for weakness. Negative for dizziness and numbness. Hematological: Negative. Psychiatric/Behavioral: Negative.     All other systems reviewed and are negative. Except as noted above the remainder of the review of systems was reviewed and negative. PAST MEDICAL HISTORY     Past Medical History:   Diagnosis Date    Cancer [facility])     Cervical cancer ([facility]          SURGICAL HISTORY     History reviewed. No pertinent surgical history. CURRENT MEDICATIONS       Previous Medications    No medications on file       ALLERGIES     Zantac [ranitidine hcl]    FAMILY HISTORY     History reviewed. No pertinent family history. SOCIAL HISTORY       Social History     Socioeconomic History    Marital status: Single     Spouse name: None    Number of children: None    Years of education: None    Highest education level: None   Occupational History    None   Social Needs    Financial resource strain: None    Food insecurity     Worry: None     Inability: None    Transportation needs     Medical: None     Non-medical: None   Tobacco Use    Smoking status: Current Every Day Smoker    Smokeless tobacco: Never Used   Substance and Sexual Activity    Alcohol use: Yes     Comment: occassional    Drug use: No    Sexual activity: None   Lifestyle    Physical activity     Days per week: None     Minutes per session: None    Stress: None   Relationships    Social connections     Talks on phone: None     Gets together: None     Attends Mandaeism service: None     Active member of club or organization: None     Attends meetings of clubs or organizations: None     Relationship status: None    Intimate partner violence     Fear of current or ex partner: None     Emotionally abused: None     Physically abused: None     Forced sexual activity: None   Other Topics Concern    None   Social History Narrative    None       SCREENINGS   NIH Stroke Scale  Interval: Baseline  Level of Consciousness (1a. ): Alert  LOC Questions (1b. ):  Answers both correctly  LOC Commands (1c. ): Performs both tasks correctly  [facility] (2. ): CALL  [place]  [facility] tel. [phone],  POTASSIUM, SODIUM results called to and read back by Chelsy MCGRATH,  02/06/2021 23:12, by Sim STARR   COVID-19   POCT VENOUS   TYPE AND SCREEN       All other labs were within normal range or not returnedas of this dictation. EMERGENCYDEPARTMENT COURSE and DIFFERENTIAL DIAGNOSIS/MDM:   Vitals:    Vitals:    02/06/21 2034 02/06/21 2202 02/06/21 2346   BP: (!) 153/100 (!) 161/95 (!) 158/101   Pulse: 88 73 78   Resp: 20  11   Temp: 98.4 °F (36.9 °C)     TempSrc: Temporal     SpO2: 100% 100% 99%   Weight: 80 lb (36.3 kg)     Height: 5' (1.524 m)         REASSESSMENT      Patient presented the emergency department with a primary complaint originally of rectal bleeding. However daughter does state that the patient has had some transient confusion over the past 2 days, generalized weakness and fatigue. Guaiac testing is negative on the patient and she does state that she has not had any rectal bleeding for the past 2 days. On further discussion, patient does admit to daily alcohol consumption. Laboratory testing reveals a hyponatremia with a sodium of 120 and a healed hypokalemia with a potassium of 3.0. Patient is alert and oriented x3 and NIH stroke scale was negative. Patient was given IV hydration with normal saline, oral potassium and given these symptoms the patient will be admitted in the hospital for further evaluation and care    MDM    PROCEDURES:    Procedures      FINAL IMPRESSION      1. Hyponatremia    2. Hypokalemia    3. Transient alteration of awareness          DISPOSITION/PLAN   DISPOSITION Admitted 02/06/2021 11:59:29 PM      PATIENT REFERRED TO:  No follow-up provider specified.     DISCHARGE MEDICATIONS:  New Prescriptions    No medications on file       (Please note that portions of this note were completed with a voice recognition program.  Efforts were made to edit the dictations but occasionally words are mis-transcribed.)    Ev Sales TIEN Del Valle, TIEN  02/07/21 0005

## 2021-02-07 NOTE — ED NOTES
Pt was taken to bathroom via w/c. Pts daughter insisted on remaining in restroom with pt. Delay in care due to pt is restroom for extended amount of time.       Gage Douglas RN  02/06/21 8395

## 2021-02-08 ENCOUNTER — APPOINTMENT (OUTPATIENT)
Dept: CT IMAGING | Age: 61
DRG: 426 | End: 2021-02-08
Payer: COMMERCIAL

## 2021-02-08 LAB
ALBUMIN SERPL-MCNC: 2.6 G/DL (ref 3.5–4.6)
ALP BLD-CCNC: 64 U/L (ref 40–130)
ALT SERPL-CCNC: 24 U/L (ref 0–33)
ANION GAP SERPL CALCULATED.3IONS-SCNC: 10 MEQ/L (ref 9–15)
ANION GAP SERPL CALCULATED.3IONS-SCNC: 7 MEQ/L (ref 9–15)
ANION GAP SERPL CALCULATED.3IONS-SCNC: 8 MEQ/L (ref 9–15)
ANION GAP SERPL CALCULATED.3IONS-SCNC: 8 MEQ/L (ref 9–15)
AST SERPL-CCNC: 38 U/L (ref 0–35)
BASOPHILS ABSOLUTE: 0 K/UL (ref 0–0.2)
BASOPHILS RELATIVE PERCENT: 0.5 %
BILIRUB SERPL-MCNC: <0.2 MG/DL (ref 0.2–0.7)
BUN BLDV-MCNC: 5 MG/DL (ref 8–23)
BUN BLDV-MCNC: 6 MG/DL (ref 8–23)
BUN BLDV-MCNC: 7 MG/DL (ref 8–23)
BUN BLDV-MCNC: 7 MG/DL (ref 8–23)
CALCIUM SERPL-MCNC: 7.7 MG/DL (ref 8.5–9.9)
CALCIUM SERPL-MCNC: 7.9 MG/DL (ref 8.5–9.9)
CALCIUM SERPL-MCNC: 7.9 MG/DL (ref 8.5–9.9)
CALCIUM SERPL-MCNC: 8.5 MG/DL (ref 8.5–9.9)
CHLORIDE BLD-SCNC: 102 MEQ/L (ref 95–107)
CHLORIDE BLD-SCNC: 92 MEQ/L (ref 95–107)
CHLORIDE BLD-SCNC: 94 MEQ/L (ref 95–107)
CHLORIDE BLD-SCNC: 94 MEQ/L (ref 95–107)
CO2: 21 MEQ/L (ref 20–31)
CO2: 23 MEQ/L (ref 20–31)
CO2: 23 MEQ/L (ref 20–31)
CO2: 26 MEQ/L (ref 20–31)
CORTISOL - AM: 7 UG/DL (ref 6.2–19.4)
CREAT SERPL-MCNC: 0.34 MG/DL (ref 0.5–0.9)
CREAT SERPL-MCNC: 0.37 MG/DL (ref 0.5–0.9)
CREAT SERPL-MCNC: 0.38 MG/DL (ref 0.5–0.9)
CREAT SERPL-MCNC: 0.41 MG/DL (ref 0.5–0.9)
EOSINOPHILS ABSOLUTE: 0 K/UL (ref 0–0.7)
EOSINOPHILS RELATIVE PERCENT: 0.5 %
FOLATE: >20 NG/ML (ref 7.3–26.1)
GFR AFRICAN AMERICAN: >60
GFR NON-AFRICAN AMERICAN: >60
GLOBULIN: 2.8 G/DL (ref 2.3–3.5)
GLUCOSE BLD-MCNC: 100 MG/DL (ref 70–99)
GLUCOSE BLD-MCNC: 104 MG/DL (ref 70–99)
GLUCOSE BLD-MCNC: 107 MG/DL (ref 70–99)
GLUCOSE BLD-MCNC: 85 MG/DL (ref 70–99)
HCT VFR BLD CALC: 33.1 % (ref 37–47)
HEMOGLOBIN: 11.4 G/DL (ref 12–16)
LYMPHOCYTES ABSOLUTE: 1.5 K/UL (ref 1–4.8)
LYMPHOCYTES RELATIVE PERCENT: 27.8 %
MAGNESIUM: 1.4 MG/DL (ref 1.7–2.4)
MCH RBC QN AUTO: 33.7 PG (ref 27–31.3)
MCHC RBC AUTO-ENTMCNC: 34.6 % (ref 33–37)
MCV RBC AUTO: 97.5 FL (ref 82–100)
MONOCYTES ABSOLUTE: 0.9 K/UL (ref 0.2–0.8)
MONOCYTES RELATIVE PERCENT: 16.1 %
NEUTROPHILS ABSOLUTE: 2.9 K/UL (ref 1.4–6.5)
NEUTROPHILS RELATIVE PERCENT: 55.1 %
PDW BLD-RTO: 12.9 % (ref 11.5–14.5)
PLATELET # BLD: 317 K/UL (ref 130–400)
POTASSIUM REFLEX MAGNESIUM: 3 MEQ/L (ref 3.4–4.9)
POTASSIUM SERPL-SCNC: 3 MEQ/L (ref 3.4–4.9)
POTASSIUM SERPL-SCNC: 3.2 MEQ/L (ref 3.4–4.9)
POTASSIUM SERPL-SCNC: 3.3 MEQ/L (ref 3.4–4.9)
POTASSIUM SERPL-SCNC: 4.3 MEQ/L (ref 3.4–4.9)
RBC # BLD: 3.39 M/UL (ref 4.2–5.4)
SODIUM BLD-SCNC: 123 MEQ/L (ref 135–144)
SODIUM BLD-SCNC: 125 MEQ/L (ref 135–144)
SODIUM BLD-SCNC: 125 MEQ/L (ref 135–144)
SODIUM BLD-SCNC: 135 MEQ/L (ref 135–144)
TOTAL PROTEIN: 5.4 G/DL (ref 6.3–8)
TSH SERPL DL<=0.05 MIU/L-ACNC: 1.59 UIU/ML (ref 0.44–3.86)
VITAMIN B-12: 218 PG/ML (ref 232–1245)
WBC # BLD: 5.3 K/UL (ref 4.8–10.8)

## 2021-02-08 PROCEDURE — 80053 COMPREHEN METABOLIC PANEL: CPT

## 2021-02-08 PROCEDURE — 70450 CT HEAD/BRAIN W/O DYE: CPT

## 2021-02-08 PROCEDURE — 90792 PSYCH DIAG EVAL W/MED SRVCS: CPT | Performed by: PSYCHIATRY & NEUROLOGY

## 2021-02-08 PROCEDURE — 6370000000 HC RX 637 (ALT 250 FOR IP): Performed by: NURSE PRACTITIONER

## 2021-02-08 PROCEDURE — 6360000002 HC RX W HCPCS: Performed by: INTERNAL MEDICINE

## 2021-02-08 PROCEDURE — 1210000000 HC MED SURG R&B

## 2021-02-08 PROCEDURE — 93010 ELECTROCARDIOGRAM REPORT: CPT | Performed by: INTERNAL MEDICINE

## 2021-02-08 PROCEDURE — 82306 VITAMIN D 25 HYDROXY: CPT

## 2021-02-08 PROCEDURE — 6370000000 HC RX 637 (ALT 250 FOR IP): Performed by: INTERNAL MEDICINE

## 2021-02-08 PROCEDURE — 83735 ASSAY OF MAGNESIUM: CPT

## 2021-02-08 PROCEDURE — 84443 ASSAY THYROID STIM HORMONE: CPT

## 2021-02-08 PROCEDURE — 85025 COMPLETE CBC W/AUTO DIFF WBC: CPT

## 2021-02-08 PROCEDURE — 82533 TOTAL CORTISOL: CPT

## 2021-02-08 PROCEDURE — 82746 ASSAY OF FOLIC ACID SERUM: CPT

## 2021-02-08 PROCEDURE — 36415 COLL VENOUS BLD VENIPUNCTURE: CPT

## 2021-02-08 PROCEDURE — 82607 VITAMIN B-12: CPT

## 2021-02-08 RX ORDER — POTASSIUM CHLORIDE 20 MEQ/1
40 TABLET, EXTENDED RELEASE ORAL ONCE
Status: COMPLETED | OUTPATIENT
Start: 2021-02-08 | End: 2021-02-08

## 2021-02-08 RX ORDER — TOLVAPTAN 15 MG/1
15 TABLET ORAL ONCE
Status: COMPLETED | OUTPATIENT
Start: 2021-02-08 | End: 2021-02-08

## 2021-02-08 RX ORDER — MAGNESIUM SULFATE IN WATER 40 MG/ML
4000 INJECTION, SOLUTION INTRAVENOUS ONCE
Status: COMPLETED | OUTPATIENT
Start: 2021-02-08 | End: 2021-02-08

## 2021-02-08 RX ADMIN — POTASSIUM CHLORIDE 40 MEQ: 20 TABLET, EXTENDED RELEASE ORAL at 11:07

## 2021-02-08 RX ADMIN — AMLODIPINE BESYLATE 5 MG: 5 TABLET ORAL at 08:10

## 2021-02-08 RX ADMIN — POTASSIUM CHLORIDE 40 MEQ: 20 TABLET, EXTENDED RELEASE ORAL at 06:35

## 2021-02-08 RX ADMIN — LORAZEPAM 1 MG: 1 TABLET ORAL at 19:59

## 2021-02-08 RX ADMIN — THERA TABS 1 TABLET: TAB at 08:10

## 2021-02-08 RX ADMIN — Medication 15 MG: at 11:07

## 2021-02-08 RX ADMIN — FOLIC ACID 1 MG: 1 TABLET ORAL at 08:10

## 2021-02-08 RX ADMIN — Medication 100 MG: at 08:10

## 2021-02-08 RX ADMIN — MAGNESIUM SULFATE HEPTAHYDRATE 4000 MG: 40 INJECTION, SOLUTION INTRAVENOUS at 11:08

## 2021-02-08 NOTE — FLOWSHEET NOTE
0809--Patient assessment complete, awake and alert; oriented to person, place, and time, can answer orientation questions appropriately, but is confused to other situations, and does not appear orientated in some context of her conversations, patient denies pain , denies n/v, denies cp & sob, call light in reach, bed in lowest position, bed alarm engaged, will continue to monitor  1324-- dr Svitlana Palma just at patient bedside talking with patient, after leaving room patient wanted devices removed to go home she states he is leaving, informed patient the doctors still want her to stay because it is not safe for her to go home at this time. She states \"you cannot hold me against my will I am leaving and need to call my family\" perfect serve sent to dr Milton Stone regarding pt interaction, dr Milton Stone would [facility] if [facility]. Perfect serve sent to gordon, awaiting response. 1412-- [facility]  [facility] not [facility], informed gayle  [phone]-- gayle notified of gordon's response, pt currently calm and relaxing, per dr Milton Stone if patient starts to act up again, pt will need to be pink slipped  2356-- spoke with Dr. Amin Spotted regarding patients large output and updated sodium level of 135, Dr. Amin Spotted okay with patient output and sodium level, telephone order received to discontinue fluids. 2251-- pt refused labs.

## 2021-02-08 NOTE — CARE COORDINATION
Per nursing report during morning rounds, the pt has been having confusion and is unsteady with her ambulation. Pt has been urinating in cups at the bedside. LSW spoke with the pt's daughter regarding these concerns and she reported that the pt has been having strange behaviors at home for several weeks. She also reported that the pt has been very weak as well. LSW to follow as pt may need a SNF at [place].
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
## 2021-02-08 NOTE — PROGRESS NOTES
Physician Progress Note    2/8/2021   1:44 PM    Name:  Georgiana Bennett  MRN:    [id number]     IP Day: 2     Admit Date: 2/6/2021  8:39 PM  PCP: No primary care provider on file. Code Status:  Full Code    Subjective:     She denies complaints at this time. She reports that prior to coming to the hospital she was very fatigued and weak and often felt like she was falling forward when she would try to stand up. She is asking to go home.     Current Facility-Administered Medications   Medication Dose Route Frequency Provider Last Rate Last Admin    magnesium sulfate 4000 mg in 100 mL IVPB premix  4,000 mg Intravenous Once Valerie Saas, DO 25 mL/hr at 02/08/21 1108 4,000 mg at 02/08/21 1108    sodium chloride flush 0.9 % injection 10 mL  10 mL Intravenous 2 times per day Amanda Salvage, APRN - CNP   10 mL at 02/07/21 1323    sodium chloride flush 0.9 % injection 10 mL  10 mL Intravenous PRN Amanda Salvage, APRN - CNP        enoxaparin (LOVENOX) injection 40 mg  40 mg Subcutaneous Daily Amanda Salvage, APRN - CNP   40 mg at 02/07/21 1323    promethazine (PHENERGAN) tablet 12.5 mg  12.5 mg Oral Q6H PRN Amanda Salvage, APRN - CNP        Or    ondansetron [facility]) injection 4 mg  4 mg Intravenous Q6H PRN Amanda Salvage, APRN - CNP        acetaminophen (TYLENOL) tablet 650 mg  650 mg Oral Q6H PRN Amanda Salvage, APRN - CNP        Or    acetaminophen (TYLENOL) suppository 650 mg  650 mg Rectal Q6H PRN Amanda Salvage, APRN - CNP        thiamine tablet 100 mg  100 mg Oral Daily Amanda Salvage, APRN - CNP   100 mg at 02/08/21 0810    multivitamin 1 tablet  1 tablet Oral Daily Amanda Salvage, APRN - CNP   1 tablet at 83/64/40 7878    folic acid (FOLVITE) tablet 1 mg  1 mg Oral Daily Amanda Salvage, APRN - CNP   1 mg at 02/08/21 0810    LORazepam (ATIVAN) tablet 1 mg  1 mg Oral Q1H PRN Amanda Salvage, APRN - CNP Or    LORazepam (ATIVAN) injection 1 mg  1 mg Intravenous Q1H PRN Rhoda Peruvian, APRN - CNP        Or    LORazepam (ATIVAN) tablet 2 mg  2 mg Oral Q1H PRN Rhoda Peruvian, APRN - CNP        Or    LORazepam (ATIVAN) injection 2 mg  2 mg Intravenous Q1H PRN Rhoda Peruvian, APRN - CNP        Or    LORazepam (ATIVAN) tablet 3 mg  3 mg Oral Q1H PRN Rhoda Peruvian, APRN - CNP        Or    LORazepam (ATIVAN) injection 3 mg  3 mg Intravenous Q1H PRN Rhoda Peruvian, APRN - CNP        Or    LORazepam (ATIVAN) tablet 4 mg  4 mg Oral Q1H PRN Rhoda Peruvian, APRN - CNP        Or    LORazepam (ATIVAN) injection 4 mg  4 mg Intravenous Q1H PRN Rhoda Peruvian, APRN - CNP        amLODIPine (NORVASC) tablet 5 mg  5 mg Oral Daily Rhoda Peruvian, APRN - CNP   5 mg at 21 0810    0.9 % sodium chloride infusion   Intravenous Continuous Virgle Rich Bescak, DO 75 mL/hr at 21 1325 1,000 mL at 21 1325       Physical Examination:      Vitals:  BP (!) 141/73   Pulse 77   Temp 97.9 °F (36.6 °C) (Oral)   Resp 18   Ht 5' (1.524 m)   Wt 80 lb (36.3 kg)   LMP  (LMP Unknown)   SpO2 98%   BMI 15.62 kg/m²   Temp (24hrs), Av.9 °F (36.6 °C), Min:97.9 °F (36.6 °C), Max:97.9 °F (36.6 °C)      General appearance: Chronically ill-appearing. Poor dentition. Frail. Oriented x3, follows commands, answers questions but not capable of complex discussion.   Lungs: clear to auscultation bilaterally, normal effort  Heart: regular rate and rhythm, no murmur  Abdomen: soft, nontender, nondistended, bowel sounds present, no masses  Extremities: no edema, redness, tenderness in the calves  Skin: no gross lesions, rashes    Data:     Labs:  Recent Labs     21  2100 21  0500   WBC 6.6 5.3   HGB 14.4 11.4*    317     Recent Labs     21  2258 21  0500   * 125*  125*   K 3.3* 3.0*  3.2*  3.0*   CL 92* 94*  94*   CO2 23 21 23   BUN 6* 7*  7*   CREATININE 0.41* 0.38*  0.37*   GLUCOSE 85 100*  107*     Recent Labs     02/07/21  0604 02/08/21  0500   AST 55* 38*   ALT 33 24   BILITOT 0.8* <0.2   ALKPHOS 94 64       Assessment and Plan:        1. Hyponatremia:  -Further work-up and treatment per nephrology. S/p 1 dose of tolvaptan and currently on normal saline. 2.  Metabolic encephalopathy secondary to above with possible contribution from alcohol use disorder, underlying psychiatric disorder  -CT head- rule out old CVA  -Check B12, folate, vitamin D  -Appreciate psychiatric evaluation and capacity assessment  -PT/OT evaluation    Hypokalemia  Hypomagnesemia  Tobacco use disorder  Alcohol use disorder-she reports drinking approximately 3 days/week - 1 to 3 24 ounce [place] beers. Supplementing vitamins  Severe protein calorie malnutrition    Diet: DIET GENERAL; Daily Fluid Restriction: 1500 ml  Ppx: Lovenox  Full Code    Dispo:  Inpatient    >35 minutes in total care time    Electronically signed by Niat Root DO on 2/8/2021 at 1:44 PM

## 2021-02-08 NOTE — PROGRESS NOTES
Physician Progress Note      PATIENT:               Aamir Husain  CSN #:                  [id number]  :                       1960  ADMIT DATE:       2021 8:39 PM  DISCH DATE:  RESPONDING  PROVIDER #:        Doroteo POTTER DO          QUERY TEXT:    Pt admitted with hyponatremia . Pt noted to have change in mental status. If   possible, please document in the progress notes and discharge summary if you   are evaluating and / or treating any of the following: The medical record reflects the following:  Risk Factors: alcohol abuse, hyponatremia, HTN  Clinical Indicators: transient confusion, lethargic, ALT 40, AST 85/09/60  Treatment: folic acid, prn Ativan, multivitamin, potassium, thiamine, samsca    Bensonreyes BARRONN, RN, CDS  [phone]  Options provided:  -- Alcoholic encephalopathy  -- Hepatic encephalopathy acute  -- Hepatic encephalopathy chronic  -- Metabolic encephalopathy  -- Wernicke?s encephalopathy  -- Delirium due to, Please specify cause  -- Delirium  -- Other - I will add my own diagnosis  -- Disagree - Not applicable / Not valid  -- Disagree - Clinically unable to determine / Unknown  -- Refer to Clinical Documentation Reviewer    PROVIDER RESPONSE TEXT:    This patient has metabolic encephalopathy.     Query created by: Emily Barkley on 2021 11:04 AM      Electronically signed by:  Donny Hicks DO 2021 1:43 PM

## 2021-02-09 LAB
ALBUMIN SERPL-MCNC: 3.1 G/DL (ref 3.5–4.6)
ALP BLD-CCNC: 69 U/L (ref 40–130)
ALT SERPL-CCNC: 23 U/L (ref 0–33)
AMMONIA: 49 UMOL/L (ref 11–51)
ANION GAP SERPL CALCULATED.3IONS-SCNC: 5 MEQ/L (ref 9–15)
ANION GAP SERPL CALCULATED.3IONS-SCNC: 8 MEQ/L (ref 9–15)
AST SERPL-CCNC: 31 U/L (ref 0–35)
BASOPHILS ABSOLUTE: 0 K/UL (ref 0–0.2)
BASOPHILS RELATIVE PERCENT: 0.9 %
BILIRUB SERPL-MCNC: <0.2 MG/DL (ref 0.2–0.7)
BUN BLDV-MCNC: 10 MG/DL (ref 8–23)
BUN BLDV-MCNC: 12 MG/DL (ref 8–23)
CALCIUM SERPL-MCNC: 8.5 MG/DL (ref 8.5–9.9)
CALCIUM SERPL-MCNC: 8.9 MG/DL (ref 8.5–9.9)
CHLORIDE BLD-SCNC: 106 MEQ/L (ref 95–107)
CHLORIDE BLD-SCNC: 98 MEQ/L (ref 95–107)
CO2: 25 MEQ/L (ref 20–31)
CO2: 26 MEQ/L (ref 20–31)
CREAT SERPL-MCNC: 0.42 MG/DL (ref 0.5–0.9)
CREAT SERPL-MCNC: 0.49 MG/DL (ref 0.5–0.9)
EOSINOPHILS ABSOLUTE: 0 K/UL (ref 0–0.7)
EOSINOPHILS RELATIVE PERCENT: 0.7 %
GFR AFRICAN AMERICAN: >60
GFR AFRICAN AMERICAN: >60
GFR NON-AFRICAN AMERICAN: >60
GFR NON-AFRICAN AMERICAN: >60
GLOBULIN: 3.2 G/DL (ref 2.3–3.5)
GLUCOSE BLD-MCNC: 87 MG/DL (ref 70–99)
GLUCOSE BLD-MCNC: 97 MG/DL (ref 70–99)
HCT VFR BLD CALC: 37.3 % (ref 37–47)
HEMOGLOBIN: 12.7 G/DL (ref 12–16)
LYMPHOCYTES ABSOLUTE: 1.9 K/UL (ref 1–4.8)
LYMPHOCYTES RELATIVE PERCENT: 35 %
MCH RBC QN AUTO: 33.4 PG (ref 27–31.3)
MCHC RBC AUTO-ENTMCNC: 34.2 % (ref 33–37)
MCV RBC AUTO: 97.7 FL (ref 82–100)
MONOCYTES ABSOLUTE: 1 K/UL (ref 0.2–0.8)
MONOCYTES RELATIVE PERCENT: 17.8 %
NEUTROPHILS ABSOLUTE: 2.5 K/UL (ref 1.4–6.5)
NEUTROPHILS RELATIVE PERCENT: 45.6 %
PDW BLD-RTO: 12.9 % (ref 11.5–14.5)
PHOSPHORUS: 3.7 MG/DL (ref 2.3–4.8)
PLATELET # BLD: 364 K/UL (ref 130–400)
POTASSIUM REFLEX MAGNESIUM: 4.4 MEQ/L (ref 3.4–4.9)
POTASSIUM SERPL-SCNC: 4.2 MEQ/L (ref 3.4–4.9)
RBC # BLD: 3.81 M/UL (ref 4.2–5.4)
SODIUM BLD-SCNC: 132 MEQ/L (ref 135–144)
SODIUM BLD-SCNC: 136 MEQ/L (ref 135–144)
TOTAL PROTEIN: 6.3 G/DL (ref 6.3–8)
WBC # BLD: 5.6 K/UL (ref 4.8–10.8)

## 2021-02-09 PROCEDURE — 83921 ORGANIC ACID SINGLE QUANT: CPT

## 2021-02-09 PROCEDURE — 80053 COMPREHEN METABOLIC PANEL: CPT

## 2021-02-09 PROCEDURE — 99232 SBSQ HOSP IP/OBS MODERATE 35: CPT | Performed by: PSYCHIATRY & NEUROLOGY

## 2021-02-09 PROCEDURE — 97165 OT EVAL LOW COMPLEX 30 MIN: CPT

## 2021-02-09 PROCEDURE — 36415 COLL VENOUS BLD VENIPUNCTURE: CPT

## 2021-02-09 PROCEDURE — 1210000000 HC MED SURG R&B

## 2021-02-09 PROCEDURE — 6370000000 HC RX 637 (ALT 250 FOR IP): Performed by: NURSE PRACTITIONER

## 2021-02-09 PROCEDURE — 6360000002 HC RX W HCPCS: Performed by: NURSE PRACTITIONER

## 2021-02-09 PROCEDURE — 97161 PT EVAL LOW COMPLEX 20 MIN: CPT

## 2021-02-09 PROCEDURE — 99253 IP/OBS CNSLTJ NEW/EST LOW 45: CPT | Performed by: PSYCHIATRY & NEUROLOGY

## 2021-02-09 PROCEDURE — 84100 ASSAY OF PHOSPHORUS: CPT

## 2021-02-09 PROCEDURE — 82140 ASSAY OF AMMONIA: CPT

## 2021-02-09 PROCEDURE — 85025 COMPLETE CBC W/AUTO DIFF WBC: CPT

## 2021-02-09 RX ADMIN — FOLIC ACID 1 MG: 1 TABLET ORAL at 08:44

## 2021-02-09 RX ADMIN — Medication 100 MG: at 08:43

## 2021-02-09 RX ADMIN — THERA TABS 1 TABLET: TAB at 08:44

## 2021-02-09 RX ADMIN — AMLODIPINE BESYLATE 5 MG: 5 TABLET ORAL at 08:44

## 2021-02-09 ASSESSMENT — ENCOUNTER SYMPTOMS
TROUBLE SWALLOWING: 0
CHOKING: 0
BACK PAIN: 0
VOMITING: 0
PHOTOPHOBIA: 0
NAUSEA: 0
COLOR CHANGE: 0
SHORTNESS OF BREATH: 0

## 2021-02-09 ASSESSMENT — PAIN SCALES - GENERAL: PAINLEVEL_OUTOF10: 0

## 2021-02-09 NOTE — PROGRESS NOTES
[facility] OCCUPATIONAL THERAPY EVALUATION - ACUTE     NAME: Natalie Pradhan  : 1960 (61 y.o.)  MRN: [id number]  CODE STATUS: Full Code  [id number]    Date of Service: 2021    Patient Diagnosis(es): Hyponatremia [E87.1]   Chief Complaint   Patient presents with    Fatigue      Rectal bleeding x 1 week, not eating. Patient Active Problem List    Diagnosis Date Noted    HTN (hypertension) 2021    Hypokalemia 2021    Alcohol use 2021    Elevated liver enzymes 2021    Hyponatremia 2021        Past Medical History:   Diagnosis Date    [facility]     [facility] ([facility]     ETOH abuse      History reviewed. No pertinent surgical history. Restrictions  Restrictions/Precautions: Fall Risk(High per [employer])     Safety Devices: Safety Devices  Safety Devices in place: Yes  Type of devices: All fall risk precautions in place        Subjective  Pre Treatment Pain Screening  Pain at present: 0  Scale Used: Numeric Score  Intervention List: Patient able to continue with treatment, Patient declined any intervention    Pain Reassessment:   Pain Assessment  Patient Currently in Pain: Denies  Pain Assessment: 0-10  Pain Level: 0       Prior Level of Function:  Social/Functional History  Lives With: Significant other  Type of Home: Apartment  Home Layout: One level  Home Access: Level entry  Bathroom Shower/Tub: Tub/Shower unit  ADL Assistance: Independent(No AD)  Homemaking Assistance: Independent  Ambulation Assistance: Independent(No AD)  Transfer Assistance: Independent  Active : No  Patient's  Info: Daughter or s/o  Additional Comments: Pt highly distractible and requires frequent redirection to question; very tangential. States she and her s/o 'help each other' but they also have local adult children.     OBJECTIVE:     Orientation Status:  Orientation  Overall Orientation Status: Within Functional Limits(Pt oriented with extended time however demonstrates some confusion throughout assessment)    Observation:  Observation/Palpation  Posture: Fair  Observation: Pt. alert, distractible, speaks very quickly and tangentially    Cognition Status:  Cognition  Overall Cognitive Status: Exceptions  Arousal/Alertness: Appropriate responses to stimuli  Following Commands: Follows one step commands with increased time  Attention Span: Attends with cues to redirect, Difficulty attending to directions, Difficulty dividing attention  Memory: Decreased recall of precautions, Decreased short term memory, Decreased long term memory  Safety Judgement: Decreased awareness of need for assistance, Decreased awareness of need for safety  Problem Solving: Assistance required to generate solutions, Assistance required to identify errors made, Assistance required to correct errors made, Assistance required to implement solutions  Insights: Decreased awareness of deficits  Initiation: Requires cues for some  Sequencing: Requires cues for some  Cognition Comment: Verbal cues for safety throughout    Perception Status:  Perception  Overall Perceptual Status: WFL    Sensation Status:  Sensation  Overall Sensation Status: WFL    Vision and Hearing Status:  Vision  Vision: Impaired  Vision Exceptions: Wears glasses for distance  Hearing  Hearing: Within functional limits     ROM:   LUE AROM (degrees)  LUE AROM : WFL  Left Hand AROM (degrees)  Left Hand AROM: WFL  RUE AROM (degrees)  RUE AROM : WFL  Right Hand AROM (degrees)  Right Hand AROM: WFL    Strength:  LUE Strength  Gross LUE Strength: Exceptions to [facility]  L Hand General: 3+/5  LUE Strength Comment: 3+/5 all planes  RUE Strength  Gross RUE Strength: Exceptions to [facility]  R Hand General: 3+/5  RUE Strength Comment: 3+/5 all planes    Coordination, Tone, Quality of Movement:    Tone RUE  RUE Tone: Normotonic  Tone LUE  LUE Tone: Normotonic  Coordination  Movements Are Fluid And Coordinated: Yes    Hand Dominance:  Hand Dominance  Hand OT Role, Plan of Care  Patient Education: Educated pt. on role of acute care OT  Barriers to Learning: Decreased attention    OT Follow Up:  OT D/C RECOMMENDATIONS  REQUIRES OT FOLLOW UP: Yes       Assessment/Discharge Disposition:  Assessment: Pt is a 61year old woman from home with family who presents to [address] with the above deficits which impact her ability to safely and independently perform ADLs and IADLs. Pt. limited d/t fatigue and decreased safety awareness. Pt. would benefit from continued OT to maximize independence and safety with ADL tasks.   Performance deficits / Impairments: Decreased cognition, Decreased ADL status, Decreased endurance, Decreased safe awareness, Decreased balance  Prognosis: Good  Discharge Recommendations: Continue to assess pending progress  Decision Making: Low Complexity  History: Pt's medical history is moderately complex  Exam: Pt. has 5 performance deficits  Assistance / Modification: Pt. requires consistent supervision    Six Click Score   How much help for putting on and taking off regular lower body clothing?: A Little  How much help for Bathing?: A Little  How much help for Toileting?: A Little  How much help for putting on and taking off regular upper body clothing?: A Little  How much help for taking care of personal grooming?: None  How much help for eating meals?: None  AM-[facility] Inpatient Daily Activity Raw Score: 20  AM-[facility] Inpatient ADL T-Scale Score : 42.03  ADL Inpatient CMS 0-100% Score: 38.32    Plan:  Plan  Times per week: 1-3x  Plan weeks: Length of acute stay  Current Treatment Recommendations: Strengthening, Balance Training, Functional Mobility Training, Endurance Training, Safety Education & Training, Patient/Caregiver Education & Training, Equipment Evaluation, Education, & procurement, Home Management Training, Self-Care / ADL, Cognitive/Perceptual Training    Goals:   Patient will:    - Improve functional endurance to tolerate/complete 30 mins of ADL's  -

## 2021-02-09 NOTE — FLOWSHEET NOTE
Patients natalie Stanley is here to visit. Inquired about patient drinking history per Dr Yajaira Olivares . Noreen Stanley states his mom drinks daily at this time and it is typically 2-24 oz beers. He states she use to drink heavier in the past, however she continues to have a poor appetite. Patients natalie Stanley wanted to speak with  Álvaro Russell in regards to discharge planning. Called Ann WELLS to come to room to speak with son.

## 2021-02-09 NOTE — PROGRESS NOTES
[address] [place] NOTE       2/9/2021     Patient was seen and examined in person, Chart reviewed   Patient's case discussed with staff/team    Chief Complaint: confusion    Interim History:     Pt is doing better, able to give a better account  Alert and oriented x 3  Pt able to recognize me from yesterday visit  Pt admit to drinking 2 x 24 ounce beer 2-3 times per day  Admit to using alcohol heavily in the past  Pt denies any AH or VH  Denies any active withdrawal symptoms  Mild disorganization in her thought process which could be her baseline  No further bizarre behavior like urinating in a cup   Slept better last night    Appetite:   [x] Normal/Unchanged  [] Increased  [] Decreased      Sleep:       [x] Normal/Unchanged  [] Fair       [] Poor              Energy:    [x] Normal/Unchanged  [] Increased  [] Decreased        SI [] Present  [x] Absent    HI  []Present  [x] Absent     Aggression:  [] yes  [] no    Patient is [] able  [] unable to CONTRACT FOR SAFETY     PAST MEDICAL/PSYCHIATRIC HISTORY:   Past Medical History:   Diagnosis Date    [facility] ([facility]     Cervical cancer ([facility]     ETOH abuse        FAMILY/SOCIAL HISTORY:  History reviewed. No pertinent family history.   Social History     Socioeconomic History    Marital status: Single     Spouse name: Not on file    Number of children: Not on file    Years of education: Not on file    Highest education level: Not on file   Occupational History    Not on file   Social Needs    Financial resource strain: Not on file    Food insecurity     Worry: Not on file     Inability: Not on file    Transportation needs     Medical: Not on file     Non-medical: Not on file   Tobacco Use    Smoking status: Current Every Day Smoker    Smokeless tobacco: Never Used   Substance and Sexual Activity    Alcohol use: Yes     Comment: occassional    Drug use: No    Sexual activity: Not on file   Lifestyle    Physical activity     Days per week: Not on file     Minutes per session: Not on file    Stress: Not on file   Relationships    Social connections     Talks on phone: Not on file     Gets together: Not on file     Attends Baptism service: Not on file     Active member of club or organization: Not on file     Attends meetings of clubs or organizations: Not on file     Relationship status: Not on file    Intimate partner violence     Fear of current or ex partner: Not on file     Emotionally abused: Not on file     Physically abused: Not on file     Forced sexual activity: Not on file   Other Topics Concern    Not on file   Social History Narrative    Not on file           ROS:  [x] All negative/unchanged except if checked.  Explain positive(checked items) below:  [] Constitutional  [] Eyes  [] Ear/Nose/Mouth/Throat  [] Respiratory  [] CV  [] GI  []   [] Musculoskeletal  [] Skin/Breast  [] Neurological  [] Endocrine  [] Heme/Lymph  [] Allergic/Immunologic    Explanation:     MEDICATIONS:    Current Facility-Administered Medications:     sodium chloride flush 0.9 % injection 10 mL, 10 mL, Intravenous, 2 times per day, ELSA Childress CNP, Stopped at 02/09/21 0844    sodium chloride flush 0.9 % injection 10 mL, 10 mL, Intravenous, PRN, Daron Hammans Williams-Andrews, APRN - CNP    enoxaparin (LOVENOX) injection 40 mg, 40 mg, Subcutaneous, Daily, Daron Hammans Williams-Andrews, APRN - CNP, 40 mg at 02/07/21 1323    promethazine (PHENERGAN) tablet 12.5 mg, 12.5 mg, Oral, Q6H PRN **OR** ondansetron (ZOFRAN) injection 4 mg, 4 mg, Intravenous, Q6H PRN, Daron Hammans Williams-Andrews, APRN - CNP    acetaminophen (TYLENOL) tablet 650 mg, 650 mg, Oral, Q6H PRN **OR** acetaminophen (TYLENOL) suppository 650 mg, 650 mg, Rectal, Q6H PRN, Daron Hammans Williams-Andrews, APRN - CNP    thiamine tablet 100 mg, 100 mg, Oral, Daily, Daron Hammans Williams-Andrews, APRN - CNP, 100 mg at 02/09/21 0843    multivitamin 1 tablet, 1 tablet, Oral, Daily, ELSA Rizo CNP, 1 tablet at 87/34/19 4398    folic acid (FOLVITE) tablet 1 mg, 1 mg, Oral, Daily, ELSA Rizo CNP, 1 mg at 02/09/21 0844    LORazepam (ATIVAN) tablet 1 mg, 1 mg, Oral, Q1H PRN, 1 mg at 02/08/21 1959 **OR** LORazepam (ATIVAN) injection 1 mg, 1 mg, Intravenous, Q1H PRN **OR** LORazepam (ATIVAN) tablet 2 mg, 2 mg, Oral, Q1H PRN **OR** LORazepam (ATIVAN) injection 2 mg, 2 mg, Intravenous, Q1H PRN **OR** LORazepam (ATIVAN) tablet 3 mg, 3 mg, Oral, Q1H PRN **OR** LORazepam (ATIVAN) injection 3 mg, 3 mg, Intravenous, Q1H PRN **OR** LORazepam (ATIVAN) tablet 4 mg, 4 mg, Oral, Q1H PRN **OR** LORazepam (ATIVAN) injection 4 mg, 4 mg, Intravenous, Q1H PRN, ELSA Hartley CNP    amLODIPine (NORVASC) tablet 5 mg, 5 mg, Oral, Daily, ELSA Hartley CNP, 5 mg at 02/09/21 8990      Examination:  /71   Pulse 74   Temp 98.1 °F (36.7 °C) (Oral)   Resp 16   Ht 5' (1.524 m)   Wt 80 lb (36.3 kg)   LMP  (LMP Unknown)   SpO2 98%   BMI 15.62 kg/m²   Gait - steady  Medication side effects(SE): no    Mental Status Examination:    Level of consciousness:  within normal limits   Appearance:  fair grooming and fair hygiene  Behavior/Motor:  psychomotor retardation  Attitude toward examiner:  cooperative  Speech:  slow   Mood: anxious  Affect:  mood congruent  Thought processes:  coherent   Thought content:  Suicidal Ideation:  denies suicidal ideation  Cognition:  oriented to person, place, and time   Concentration intact  Insight fair   Judgement fair     ASSESSMENT:   Patient symptoms are:  [] Well controlled  [x] Improving  [] Worsening  [] No change      Diagnosis:   Delirium- resolving  Principal Problem:    Hyponatremia  Active Problems:    HTN (hypertension)    Hypokalemia    Alcohol use    Elevated liver enzymes  Resolved Problems:    * No resolved hospital problems.  *      LABS:    Recent Labs     02/06/21  2100 02/08/21  0504

## 2021-02-09 NOTE — CONSULTS
Subjective:      Patient ID: Terrell Bruce is a 61 y.o. female who presents today for encephalopathy. HPI 70-year-old right-handed female admitted with encephalopathy. Patient low sodium of 127. We are consulted patient has some uretic behaviors yesterday she was micturating in her plate. She is much better this morning. I had a lengthy discussion with her and I did not find any memory issues though this can fluctuate. She does have some balance issues and may harbor a neuropathy given that she has no reflexes. She denies any back pain. She otherwise functions well. It is more than likely that she may be consuming alcohol given the history and findings that we see. The daughter is not quite give me the right details on this. She does not any family members with neuropathy. The reason this question was raised that she has bitemporal wasting to make sure she does not have any Frederichs  ataxia. Patient denies any numbness or tingling    Review of Systems   Constitutional: Negative for fever. HENT: Negative for ear pain, tinnitus and trouble swallowing. Eyes: Negative for photophobia and visual disturbance. Respiratory: Negative for choking and shortness of breath. Cardiovascular: Negative for chest pain and palpitations. Gastrointestinal: Negative for nausea and vomiting. Musculoskeletal: Negative for back pain, gait problem, joint swelling, myalgias, neck pain and neck stiffness. Skin: Negative for color change. Allergic/Immunologic: Negative for food allergies. Neurological: Negative for dizziness, tremors, seizures, syncope, facial asymmetry, speech difficulty, weakness, light-headedness, numbness and headaches. Psychiatric/Behavioral: Positive for confusion and hallucinations. Negative for behavioral problems and sleep disturbance. Past Medical History:   Diagnosis Date    Cancer [facility])     Cervical [facility] ([facility]     ETOH abuse      History reviewed.  No pertinent surgical history. Social History     Socioeconomic History    Marital status: Single     Spouse name: Not on file    Number of children: Not on file    Years of education: Not on file    Highest education level: Not on file   Occupational History    Not on file   Social Needs    Financial resource strain: Not on file    Food insecurity     Worry: Not on file     Inability: Not on file    Transportation needs     Medical: Not on file     Non-medical: Not on file   Tobacco Use    Smoking status: Current Every Day Smoker    Smokeless tobacco: Never Used   Substance and Sexual Activity    Alcohol use: Yes     Comment: occassional    Drug use: No    Sexual activity: Not on file   Lifestyle    Physical activity     Days per week: Not on file     Minutes per session: Not on file    Stress: Not on file   Relationships    Social connections     Talks on phone: Not on file     Gets together: Not on file     Attends Restorationism service: Not on file     Active member of club or organization: Not on file     Attends meetings of clubs or organizations: Not on file     Relationship status: Not on file    Intimate partner violence     Fear of current or ex partner: Not on file     Emotionally abused: Not on file     Physically abused: Not on file     Forced sexual activity: Not on file   Other Topics Concern    Not on file   Social History Narrative    Not on file     History reviewed. No pertinent family history.   Allergies   Allergen Reactions    Zantac [Ranitidine Hcl]      Current Facility-Administered Medications   Medication Dose Route Frequency Provider Last Rate Last Admin    sodium chloride flush 0.9 % injection 10 mL  10 mL Intravenous 2 times per day ELSA Contreras CNP   Stopped at 02/09/21 0844    sodium chloride flush 0.9 % injection 10 mL  10 mL Intravenous PRN ELSA Contreras - TRACY        enoxaparin (LOVENOX) injection 40 mg  40 mg Subcutaneous Daily Candelaria Meyer KAILYN LazarN - CNP   40 mg at 02/07/21 1323    promethazine (PHENERGAN) tablet 12.5 mg  12.5 mg Oral Q6H PRN Dayami Ungerer, APRN - CNP        Or    [facility]dansetron [facility]) injection 4 mg  4 mg Intravenous Q6H PRN Dayami Lusher, APRN - CNP        acetaminophen (TYLENOL) tablet 650 mg  650 mg Oral Q6H PRN Dayami Lusher, APRN - CNP        Or    acetaminophen (TYLENOL) suppository 650 mg  650 mg Rectal Q6H PRN Dayami Ungerer, APRN - CNP        thiamine tablet 100 mg  100 mg Oral Daily Dayami Lularaer, APRN - CNP   100 mg at 02/09/21 4501    multivitamin 1 tablet  1 tablet Oral Daily Dayami Ungerer, APRN - CNP   1 tablet at 93/00/96 7970    folic acid (FOLVITE) tablet 1 mg  1 mg Oral Daily Dayami Ungerer, APRN - CNP   1 mg at 02/09/21 4710    LORazepam (ATIVAN) tablet 1 mg  1 mg Oral Q1H PRN Dayami Hoyossher, APRN - CNP   1 mg at 02/08/21 6670    Or    LORazepam (ATIVAN) injection 1 mg  1 mg Intravenous Q1H PRN Dayami Gautam, APRN - CNP        Or    LORazepam (ATIVAN) tablet 2 mg  2 mg Oral Q1H PRN Dayami Hoyossher, APRN - CNP        Or    LORazepam (ATIVAN) injection 2 mg  2 mg Intravenous Q1H PRN Dayami Hoyossher, APRN - CNP        Or    LORazepam (ATIVAN) tablet 3 mg  3 mg Oral Q1H PRN Dayami Lusher, APRN - CNP        Or    LORazepam (ATIVAN) injection 3 mg  3 mg Intravenous Q1H PRN Dayami Ungerer, APRN - CNP        Or    LORazepam (ATIVAN) tablet 4 mg  4 mg Oral Q1H PRN Dayami Lusher, APRN - CNP        Or    LORazepam (ATIVAN) injection 4 mg  4 mg Intravenous Q1H PRN Dayami Hoyossher, APRN - CNP        amLODIPine (NORVASC) tablet 5 mg  5 mg Oral Daily Cally Lazar APRN - CNP   5 mg at 02/09/21 0844        Objective:   /71   Pulse 74   Temp 98.1 °F (36.7 °C) (Oral)   Resp 16   Ht 5' (1.524 m)   Wt 80 lb (36.3 kg)   LMP  (LMP Unknown)   SpO2 98%   BMI 15.62 kg/m²     Physical Exam  Vitals signs reviewed. Eyes:      Pupils: Pupils are equal, round, and reactive to light. Neck:      Musculoskeletal: Normal range of motion. Cardiovascular:      Rate and Rhythm: Normal rate and regular rhythm. Heart sounds: No murmur. Pulmonary:      Effort: Pulmonary effort is normal.      Breath sounds: Normal breath sounds. Abdominal:      General: Bowel sounds are normal.   Musculoskeletal: Normal range of motion. Skin:     General: Skin is warm. Neurological:      Mental Status: She is alert and oriented to person, place, and time. Cranial Nerves: No cranial nerve deficit. Sensory: No sensory deficit. Motor: No abnormal muscle tone. Coordination: Coordination normal.      Deep Tendon Reflexes: Babinski sign absent on the right side. Babinski sign absent on the left side. Reflex Scores:       Tricep reflexes are 1+ on the right side and 1+ on the left side. Bicep reflexes are 0 on the right side and 0 on the left side. Brachioradialis reflexes are 0 on the right side and 0 on the left side. Patellar reflexes are 0 on the right side and 0 on the left side. Achilles reflexes are 0 on the right side and 0 on the left side. Psychiatric:         Mood and Affect: Mood normal.     She has bitemporal wasting there is no clinical myotonia  Ct Abdomen Pelvis W Iv Contrast    Result Date: 2/7/2021  CT of the Abdomen and Pelvis were intravenous contrast medium History:  Hematochezia Technical Factors: CT imaging of the abdomen and pelvis were obtained and formatted as 5 mm contiguous axial images from the domes of the diaphragm to the symphysis pubis. Sagittal and coronal reconstructions were also obtained. Oral contrast medium:  None. Intravenous contrast medium:  Isovue-370, 100 mL. Comparison:  February 6, 2021, August 1, 2014. Findings: Lungs:  Lung bases are clear.  Liver:  Normal in size, shape, and TIME:2/7/2021 12:00 PM CLINICAL HISTORY: Epigastric pain   elevated liver enzymes. COMPARISON: None TECHNIQUE: Gray-scale evaluation of the right upper quadrant was performed. FINDINGS:            Liver: Hepatic echogenicity is within normal limits without intrahepatic biliary dilatation. Gallbladder: Echogenic foci in the gallbladder that move and shadow consistent with gallstones. Some gallbladder sludge is present. No pericholecystic fluid. No gallbladder wall thickening. The common duct measures up to 3 mm. Pancreas: Was not optimally visualized due to bowel and gas shadowing, but the visualized portion did not demonstrate any gross pathology. Multiple gallstones.        Lab Results   Component Value Date    WBC 5.6 02/09/2021    RBC 3.81 02/09/2021    HGB 12.7 02/09/2021    HCT 37.3 02/09/2021    MCV 97.7 02/09/2021    MCH 33.4 02/09/2021    MCHC 34.2 02/09/2021    RDW 12.9 02/09/2021     02/09/2021    MPV 7.1 08/01/2014     Lab Results   Component Value Date     02/09/2021    K 4.2 02/09/2021    K 4.4 02/09/2021    CL 98 02/09/2021    CO2 26 02/09/2021    BUN 12 02/09/2021    CREATININE 0.49 02/09/2021    GFRAA >60.0 02/09/2021    LABGLOM >60.0 02/09/2021    GLUCOSE 97 02/09/2021    PROT 6.3 02/09/2021    LABALBU 3.1 02/09/2021    CALCIUM 8.5 02/09/2021    BILITOT <0.2 02/09/2021    ALKPHOS 69 02/09/2021    AST 31 02/09/2021    ALT 23 02/09/2021     Lab Results   Component Value Date    PROTIME 11.7 02/06/2021    INR 0.9 02/06/2021     Lab Results   Component Value Date    TSH 1.590 02/08/2021    GVZVWLVO72 218 02/08/2021    FOLATE >20.0 02/08/2021     No results found for: TRIG, HDL, LDLCALC, LDLDIRECT, LABVLDL  Lab Results   Component Value Date    LABAMPH Neg 08/01/2014    BARBSCNU Neg 08/01/2014    LABBENZ Neg 08/01/2014    OPIATESCREENURINE Neg 08/01/2014    PHENCYCLIDINESCREENURINE Neg 08/01/2014    ETOH <10 02/06/2021     No results found for: LITHIUM, DILFRTOT, VALPROATE    Assessment:      Encephalopathy related to underlying hyponatremia which is improved. The etiology of this is excessive fluid intake and could be alcohol. Patient truly does not demonstrate the dementia she is oriented today. CT scan of the head does not anything significant except she has small vessel ischemic changes and microvascular changes. She is likely has risk factors for the same. Patient has brain atrophy consistent with the above diagnosis. Clinical examination is normal except she is noted to have bitemporal wasting and areflexia though she has not developed an ataxia to suggest a Seth ataxia there is no family history.     We will continue to keep observation follow with you   Plan:

## 2021-02-09 NOTE — FLOWSHEET NOTE
Assumed care of patient assessment unchanged. Asking for water and taking tele monitor off educated on fluid restriction and also on her tele monitor.  She is not agreeable to the monitor at this time will check with her later Electronically signed by Carlos Manuel Bazan RN on 2/8/2021 at 11:52 PM

## 2021-02-09 NOTE — PROGRESS NOTES
Physical Therapy Med Surg Initial Assessment  [facility]  Room: N223/N223-01       NAME: Patricia Brennan  : 1960 (61 y.o.)  MRN: [id number]  CODE STATUS: Full Code    Date of Service: 2021    Patient Diagnosis(es): Hyponatremia [E87.1]   Chief Complaint   Patient presents with    Fatigue      Rectal bleeding x 1 week, not eating. Patient Active Problem List    Diagnosis Date Noted    HTN (hypertension) 2021    Hypokalemia 2021    Alcohol use 2021    Elevated liver enzymes 2021    Hyponatremia 2021        Past Medical History:   Diagnosis Date    [facility]     [facility] ([facility]     ETOH abuse      History reviewed. No pertinent surgical history. Chart Reviewed: Yes  Patient assessed for rehabilitation services?: Yes  Family / Caregiver Present: No  General Comment  Comments: confusion at times; talks off topic; refusing some aspects of care; PT/OT co-eval    Restrictions:  Restrictions/Precautions: Fall Risk(high robins score)     SUBJECTIVE:      Pain  Pre Treatment Pain Screening  Pain at present: 0  Intervention List: Patient able to continue with treatment    Post Treatment Pain Screening:   Pain Assessment  Pain Level: 0    Prior Level of Function:  Social/Functional History  Lives With: Significant other  Type of Home: Apartment(washer and rhodes on a different from from pt's apartment)  Home Layout: One level  Home Access: Level entry  Bathroom Shower/Tub: Tub/Shower unit  ADL Assistance: Independent(No AD)  Homemaking Assistance: Independent  Ambulation Assistance: Independent(No AD)  Transfer Assistance: Independent  Active : No  Patient's  Info: Daughter or s/o  Additional Comments: Pt highly distractible and requires frequent redirection to question; very tangential. States she and her s/o 'help each other' but they also have local adult children.     OBJECTIVE:   Vision: Impaired  Vision Exceptions: Wears glasses for distance  Hearing: Within functional limits    Cognition:  Overall Orientation Status: Impaired  Orientation Level: Oriented to time, Disoriented to situation, Oriented to person, Disoriented to place(required increased time to recall month, day, year; stated hospital but unable to recall name of hospital)  Follows Commands: Within Functional Limits    Observation/Palpation  Observation: Pt. alert, distractible, speaks very quickly and tangentially; no SOB or dizziness with functional mobility    ROM:  RLE AROM: WFL  LLE AROM : WFL    Strength:  Strength RLE  Comment: grossly 4+/5  Strength LLE  Comment: grossly 4+/5    Neuro:  Balance  Posture: Good  Sitting - Static: Good  Sitting - Dynamic: Good  Standing - Static: Good  Standing - Dynamic: Good;-(deviated path intermittently with 75ft distance)     Tone RLE  RLE Tone: Normotonic  Tone LLE  LLE Tone: Normotonic  Motor Control  Gross Motor?: WFL  Sensation  Overall Sensation Status: WFL    Bed mobility  Supine to Sit: Stand by assistance(HOB flat; no use of rail; able to manage own covers; SBA due to distractable behaviors)  Sit to Supine: (NT seated in bedside chair at end of eval)    Transfers  Sit to Stand: Stand by assistance  Stand to sit: Stand by assistance  Comment: no AD; assistance due to distractable with decreased safety awareness    Ambulation  Ambulation?: Yes  Ambulation 1  Surface: level tile  Device: No Device  Assistance: Stand by assistance  Quality of Gait: slowed speed of swing R LE compared to L LE  Gait Deviations: Slow Madyson  Distance: 75 ft    Activity Tolerance  Activity Tolerance: Patient limited by fatigue;Patient limited by cognitive status    PT Education  PT Education: Goals;PT Role;Transfer Training;Plan of Care;General Safety;Gait Training    ASSESSMENT:   Body structures, Functions, Activity limitations: Decreased functional mobility ; Decreased safe awareness;Decreased balance;Decreased cognition;Decreased endurance  Decision Making: Low Complexity  History: med  Exam: low  Clinical Presentation: low    Prognosis: Good    DISCHARGE RECOMMENDATIONS:  Discharge Recommendations: Continue to assess pending progress, Patient would benefit from continued therapy after discharge    Assessment: Pt exhibits quick fatigue with minimal activity, decreased cognition, and decreased balance with functional mobility with carryover to decreased assistance to avoid falls. Continued PT is indicated for safe d/c home at indep level. REQUIRES PT FOLLOW UP: Yes      PLAN OF CARE:  Plan  Times per week: 3-6  Current Treatment Recommendations: Strengthening, Functional Mobility Training, Wheelchair Mobility Training, Neuromuscular Re-education, Equipment Evaluation, Education, & procurement, Home Exercise Program, ROM, Transfer Training, Gait Training, Manual Therapy - Soft Tissue Mobilization, Cognitive Reorientation, Safety Education & Training, Balance Training, Endurance Training, Stair training, Pain Management, Patient/Caregiver Education & Training, Positioning  Safety Devices  Type of devices: Call light within reach, Chair alarm in place, Left in chair    Goals:  Long term goals  Long term goal 1: Pt will demonstrate bed mobility indep with good safety awareness for safe d/c home. Long term goal 2: Pt will demonstrate transfer no AD indep without LOB for safe d/c home. Long term goal 3: Pt will demonstrate amb > or = 200ft no AD indep to allow pt to safely amb inside her home. Long term goal 4: Pt will demonstrate improved standing balance evidenced by 22/24 on DGI indicated no risk for falls. Long term goal 5: Pt will demonstrate stair negotiation 1 flight with 1 rail to allow pt to go to second floor of apartment building where washer/ dryer is located.     [facility] (6 CLICK) BASIC MOBILITY  AM-PAC Inpatient Mobility Raw Score : 20    Therapy Time:   Individual   Time In 1102   Time Out 1117   Minutes [address] [place] [address] CHANDLER Murillo, 02/09/21 at 11:43 AM         Definitions for assistance levels  Independent = pt does not require any physical supervision or assistance from another person for activity completion. Device may be needed.   Stand by assistance = pt requires verbal cues or instructions from another person, close to but not touching, to perform the activity  Minimal assistance= pt performs 75% or more of the activity; assistance is required to complete the activity  Moderate assistance= pt performs 50% of the activity; assistance is required to complete the activity  Maximal assistance = pt performs 25% of the activity; assistance is required to complete the activity  Dependent = pt requires total physical assistance to accomplish the task

## 2021-02-09 NOTE — PROGRESS NOTES
Summary (Last 24 hours) at 2/9/2021 0830  Last data filed at 2/9/2021 0516  Gross per 24 hour   Intake 1570 ml   Output --   Net 1570 ml       General: alert, in no apparent distress  HEENT: normocephalic, atraumatic, anicteric  Neck: supple, no mass  Lungs: non-labored respirations, clear to auscultation bilaterally  Heart: regular rate and rhythm, no murmurs or rubs  Abdomen: soft, non-tender, non-distended  Ext: no cyanosis, no peripheral edema  Neuro: alert and oriented, no gross abnormalities  Psych: normal mood and affect  Skin: no rash      Electronically signed by Nickolas Kelley MD

## 2021-02-10 VITALS
TEMPERATURE: 98.1 F | SYSTOLIC BLOOD PRESSURE: 128 MMHG | DIASTOLIC BLOOD PRESSURE: 81 MMHG | OXYGEN SATURATION: 97 % | WEIGHT: 83.7 LBS | HEART RATE: 72 BPM | HEIGHT: 60 IN | RESPIRATION RATE: 16 BRPM | BODY MASS INDEX: 16.43 KG/M2

## 2021-02-10 PROBLEM — G93.41 METABOLIC ENCEPHALOPATHY: Status: ACTIVE | Noted: 2021-02-10

## 2021-02-10 LAB
ANION GAP SERPL CALCULATED.3IONS-SCNC: 8 MEQ/L (ref 9–15)
BUN BLDV-MCNC: 12 MG/DL (ref 8–23)
CALCIUM SERPL-MCNC: 8.6 MG/DL (ref 8.5–9.9)
CHLORIDE BLD-SCNC: 93 MEQ/L (ref 95–107)
CO2: 25 MEQ/L (ref 20–31)
CREAT SERPL-MCNC: 0.56 MG/DL (ref 0.5–0.9)
GFR AFRICAN AMERICAN: >60
GFR NON-AFRICAN AMERICAN: >60
GLUCOSE BLD-MCNC: 71 MG/DL (ref 70–99)
POTASSIUM SERPL-SCNC: 3.2 MEQ/L (ref 3.4–4.9)
SODIUM BLD-SCNC: 126 MEQ/L (ref 135–144)
VITAMIN D 25-HYDROXY: 12.8 NG/ML (ref 30–100)

## 2021-02-10 PROCEDURE — 80048 BASIC METABOLIC PNL TOTAL CA: CPT

## 2021-02-10 PROCEDURE — 99233 SBSQ HOSP IP/OBS HIGH 50: CPT | Performed by: PSYCHIATRY & NEUROLOGY

## 2021-02-10 PROCEDURE — 6370000000 HC RX 637 (ALT 250 FOR IP): Performed by: INTERNAL MEDICINE

## 2021-02-10 PROCEDURE — 97116 GAIT TRAINING THERAPY: CPT

## 2021-02-10 PROCEDURE — 99232 SBSQ HOSP IP/OBS MODERATE 35: CPT | Performed by: PSYCHIATRY & NEUROLOGY

## 2021-02-10 PROCEDURE — APPSS30 APP SPLIT SHARED TIME 16-30 MINUTES: Performed by: NURSE PRACTITIONER

## 2021-02-10 PROCEDURE — 6370000000 HC RX 637 (ALT 250 FOR IP): Performed by: NURSE PRACTITIONER

## 2021-02-10 PROCEDURE — 36415 COLL VENOUS BLD VENIPUNCTURE: CPT

## 2021-02-10 RX ORDER — VITAMIN B COMPLEX
2000 TABLET ORAL DAILY
Status: DISCONTINUED | OUTPATIENT
Start: 2021-02-10 | End: 2021-02-10 | Stop reason: HOSPADM

## 2021-02-10 RX ORDER — POTASSIUM CHLORIDE 20 MEQ/1
20 TABLET, EXTENDED RELEASE ORAL DAILY
Qty: 30 TABLET | Refills: 0 | Status: SHIPPED | OUTPATIENT
Start: 2021-02-10

## 2021-02-10 RX ORDER — POTASSIUM CHLORIDE 20 MEQ/1
40 TABLET, EXTENDED RELEASE ORAL ONCE
Status: COMPLETED | OUTPATIENT
Start: 2021-02-10 | End: 2021-02-10

## 2021-02-10 RX ORDER — SODIUM CHLORIDE 1000 MG
2 TABLET, SOLUBLE MISCELLANEOUS ONCE
Status: COMPLETED | OUTPATIENT
Start: 2021-02-10 | End: 2021-02-10

## 2021-02-10 RX ORDER — CHOLECALCIFEROL (VITAMIN D3) 50 MCG
2000 TABLET ORAL DAILY
Qty: 120 TABLET | Refills: 0 | Status: SHIPPED | OUTPATIENT
Start: 2021-02-10

## 2021-02-10 RX ADMIN — AMLODIPINE BESYLATE 5 MG: 5 TABLET ORAL at 08:28

## 2021-02-10 RX ADMIN — FOLIC ACID 1 MG: 1 TABLET ORAL at 08:27

## 2021-02-10 RX ADMIN — POTASSIUM CHLORIDE 40 MEQ: 20 TABLET, EXTENDED RELEASE ORAL at 15:01

## 2021-02-10 RX ADMIN — Medication 2 G: at 15:01

## 2021-02-10 RX ADMIN — Medication 100 MG: at 08:28

## 2021-02-10 RX ADMIN — THERA TABS 1 TABLET: TAB at 08:27

## 2021-02-10 RX ADMIN — Medication 2000 UNITS: at 12:26

## 2021-02-10 ASSESSMENT — ENCOUNTER SYMPTOMS
COLOR CHANGE: 0
WHEEZING: 0
SHORTNESS OF BREATH: 0
VOMITING: 0
CHEST TIGHTNESS: 0
NAUSEA: 0
COUGH: 0
TROUBLE SWALLOWING: 0

## 2021-02-10 ASSESSMENT — PAIN SCALES - GENERAL: PAINLEVEL_OUTOF10: 0

## 2021-02-10 NOTE — PROGRESS NOTES
[facility]. FOLLOW-UP NOTE       2/10/2021     Patient was seen and examined in person, Chart reviewed   Patient's case discussed with staff/team    Chief Complaint: confusion    Interim History:     Alert and oriented x 3  Feel better  Has mild cognitive deficits around recent memory  Remote memory intact  Less confused  Slept better last night  No further bizarre behavior      Appetite:   [x] Normal/Unchanged  [] Increased  [] Decreased      Sleep:       [x] Normal/Unchanged  [] Fair       [] Poor              Energy:    [x] Normal/Unchanged  [] Increased  [] Decreased        SI [] Present  [x] Absent    HI  []Present  [x] Absent     Aggression:  [] yes  [] no    Patient is [] able  [] unable to CONTRACT FOR SAFETY     PAST MEDICAL/PSYCHIATRIC HISTORY:   Past Medical History:   Diagnosis Date    Cancer ([facility]     Cervical cancer ([facility]     ETOH abuse        FAMILY/SOCIAL HISTORY:  History reviewed. No pertinent family history.   Social History     Socioeconomic History    Marital status: Single     Spouse name: Not on file    Number of children: Not on file    Years of education: Not on file    Highest education level: Not on file   Occupational History    Not on file   Social Needs    Financial resource strain: Not on file    Food insecurity     Worry: Not on file     Inability: Not on file    Transportation needs     Medical: Not on file     Non-medical: Not on file   Tobacco Use    Smoking status: Current Every Day Smoker    Smokeless tobacco: Never Used   Substance and Sexual Activity    Alcohol use: Yes     Comment: occassional    Drug use: No    Sexual activity: Not on file   Lifestyle    Physical activity     Days per week: Not on file     Minutes per session: Not on file    Stress: Not on file   Relationships    Social connections     Talks on phone: Not on file     Gets together: Not on file     Attends Spiritism service: Not on file     Active member of club or organization: Not on file     Attends meetings of clubs or organizations: Not on file     Relationship status: Not on file    Intimate partner violence     Fear of current or ex partner: Not on file     Emotionally abused: Not on file     Physically abused: Not on file     Forced sexual activity: Not on file   Other Topics Concern    Not on file   Social History Narrative    Not on file           ROS:  [x] All negative/unchanged except if checked.  Explain positive(checked items) below:  [] Constitutional  [] Eyes  [] Ear/Nose/Mouth/Throat  [] Respiratory  [] CV  [] GI  []   [] Musculoskeletal  [] Skin/Breast  [] Neurological  [] Endocrine  [] Heme/Lymph  [] Allergic/Immunologic    Explanation:     MEDICATIONS:    Current Facility-Administered Medications:     Vitamin D (CHOLECALCIFEROL) tablet 2,000 Units, 2,000 Units, Oral, Daily, Lulla Canal, DO, 2,000 Units at 02/10/21 1226    sodium chloride tablet 2 g, 2 g, Oral, Once, Corby R Asher, DO    potassium chloride (KLOR-CON M) extended release tablet 40 mEq, 40 mEq, Oral, Once, Corby R Asher, DO    sodium chloride flush 0.9 % injection 10 mL, 10 mL, Intravenous, 2 times per day, ELSA Polanco CNP, Stopped at 02/09/21 0844    sodium chloride flush 0.9 % injection 10 mL, 10 mL, Intravenous, PRN, ELSA Valero CNP    enoxaparin (LOVENOX) injection 40 mg, 40 mg, Subcutaneous, Daily, ELSA Valero CNP, 40 mg at 02/07/21 1323    promethazine (PHENERGAN) tablet 12.5 mg, 12.5 mg, Oral, Q6H PRN **OR** ondansetron (ZOFRAN) injection 4 mg, 4 mg, Intravenous, Q6H PRN, ELSA Valero CNP    acetaminophen (TYLENOL) tablet 650 mg, 650 mg, Oral, Q6H PRN **OR** acetaminophen (TYLENOL) suppository 650 mg, 650 mg, Rectal, Q6H PRN, ELSA Valero CNP    thiamine tablet 100 mg, 100 mg, Oral, Daily, Margo Lazar APRN - CNP, 100 mg at 02/10/21 3932   multivitamin 1 tablet, 1 tablet, Oral, Daily, ELSA Childress CNP, 1 tablet at 02/76/63 7971    folic acid (FOLVITE) tablet 1 mg, 1 mg, Oral, Daily, ELSA Childress CNP, 1 mg at 02/10/21 0827    LORazepam (ATIVAN) tablet 1 mg, 1 mg, Oral, Q1H PRN, 1 mg at 02/08/21 1959 **OR** LORazepam (ATIVAN) injection 1 mg, 1 mg, Intravenous, Q1H PRN **OR** LORazepam (ATIVAN) tablet 2 mg, 2 mg, Oral, Q1H PRN **OR** LORazepam (ATIVAN) injection 2 mg, 2 mg, Intravenous, Q1H PRN **OR** LORazepam (ATIVAN) tablet 3 mg, 3 mg, Oral, Q1H PRN **OR** LORazepam (ATIVAN) injection 3 mg, 3 mg, Intravenous, Q1H PRN **OR** LORazepam (ATIVAN) tablet 4 mg, 4 mg, Oral, Q1H PRN **OR** LORazepam (ATIVAN) injection 4 mg, 4 mg, Intravenous, Q1H PRN, Daron Hammans Williams-Andrews, APRN - CNP    amLODIPine (NORVASC) tablet 5 mg, 5 mg, Oral, Daily, Daron Hammans Williams-Andrews, APRN - CNP, 5 mg at 02/10/21 5931      Examination:  /81   Pulse 72   Temp 98.1 °F (36.7 °C) (Oral)   Resp 16   Ht 5' (1.524 m)   Wt 83 lb 11.2 oz (38 kg)   LMP  (LMP Unknown)   SpO2 97%   BMI 16.35 kg/m²   Gait - steady  Medication side effects(SE): no    Mental Status Examination:    Level of consciousness:  within normal limits   Appearance:  fair grooming and fair hygiene  Behavior/Motor:  psychomotor retardation  Attitude toward examiner:  cooperative  Speech:  slow   Mood: anxious  Affect:  mood congruent  Thought processes:  coherent   Thought content:  Suicidal Ideation:  denies suicidal ideation  Cognition:  oriented to person, place, and time   Concentration intact  Insight fair   Judgement fair     ASSESSMENT:   Patient symptoms are:  [] Well controlled  [x] Improving  [] Worsening  [] No change      Diagnosis:   Delirium- resolving  Principal Problem:    Hyponatremia  Active Problems:    HTN (hypertension)    Hypokalemia    Alcohol use    Elevated liver enzymes    Encephalopathy acute    Metabolic encephalopathy  Resolved Problems:    * No resolved hospital problems. *      LABS:    Recent Labs     02/08/21  0500 02/09/21  0454   WBC 5.3 5.6   HGB 11.4* 12.7    364     Recent Labs     02/09/21  0454 02/09/21  1608 02/10/21  1220    132* 126*   K 4.4 4.2 3.2*    98 93*   CO2 25 26 25   BUN 10 12 12   CREATININE 0.42* 0.49* 0.56   GLUCOSE 87 97 71     Recent Labs     02/08/21  0500 02/09/21  0454   BILITOT <0.2 <0.2   ALKPHOS 64 69   AST 38* 31   ALT 24 23     Lab Results   Component Value Date    LABAMPH Neg 08/01/2014    BARBSCNU Neg 08/01/2014    LABBENZ Neg 08/01/2014    OPIATESCREENURINE Neg 08/01/2014    PHENCYCLIDINESCREENURINE Neg 08/01/2014    ETOH <10 02/06/2021     Lab Results   Component Value Date    TSH 1.590 02/08/2021     No results found for: LITHIUM  No results found for: VALPROATE, CBMZ    Treatment Plan:    Delirium improving.  Pt is feeling better  Pt can be discharged from psych standpoint when medically stable  Can d/c ciwa protocol if not scoring  Continue thiamine and MVT       Electronically signed by Ran Weldon MD on 2/10/2021 at 1:49 PM

## 2021-02-10 NOTE — PROGRESS NOTES
Comprehensive Nutrition Assessment    Type and Reason for Visit:  Initial(Low BMI)    Nutrition Recommendations/Plan: Continue Current Diet, Start additonal Oral Nutrition Supplement    Nutrition Assessment:  Pt presents at nutitional risk due to low BMI, apparent hx of weight loss, hx etoh abuse. To provide 2 supplements per meals for additional calories and protein. Malnutrition Assessment:  Malnutrition Status:  Severe malnutrition    Context:  Social/Environmental Circumstances     Findings of the 6 clinical characteristics of malnutrition:  Energy Intake:  No significant decrease in energy intake  Weight Loss:  Unable to assess     Body Fat Loss:  7 - Severe body fat loss Orbital, Buccal region   Muscle Mass Loss:  7 - Severe muscle mass loss Temples (temporalis), Clavicles (pectoralis & deltoids)  Fluid Accumulation:  No significant fluid accumulation     Strength:  Not Performed    Estimated Daily Nutrient Needs:  Energy (kcal):  1386-4947 (kg x 35-40); Weight Used for Energy Requirements:  Current     Protein (g):  57-76 (kg x 1.5-2.0); Weight Used for Protein Requirements:  Current        Fluid (ml/day):  Per MD; Method Used for Fluid Requirements:  1 ml/kcal      Nutrition Related Findings:  PMH- cervical Ca, etoh abuse; adm for hyponatremia. No edema noted. Pt stated that her appetite is good. Pt unclear regarding weigh history, but weight loss seems apparent. Pt happy to have supplements provided.       Wounds:  None       Current Nutrition Therapies:    DIET GENERAL; Daily Fluid Restriction: 1500 ml (%)  Dietary Nutrition Supplements: Standard High Calorie Oral Supplement bid     Anthropometric Measures:  · Height: 5' (152.4 cm)  · Current Body Weight: 83 lb 11.2 oz (38 kg)(2/9)   · Admission Body Weight: 80 lb (36.3 kg)(stated)    · Usual Body Weight: (90-95lb stated)     · Ideal Body Weight: 100 lbs; % Ideal Body Weight   84%  · BMI: 16.3  · BMI Categories: Underweight (BMI less than 25) age over 72       Nutrition Diagnosis:   · Underweight related to inadequate protein-energy intake(hx etoh abuse) as evidenced by BMI, weight loss    Nutrition Interventions:   Food and/or Nutrient Delivery:  Continue Current Diet, Start Oral Nutrition Supplement (increase high calorie supplement to tid and also provide nutritious pudding x1/day & frozen supplement bid)  Nutrition Education/Counseling:  Education not indicated   Coordination of Nutrition Care:  Continue to monitor while inpatient    Goals:  Intake >75% of meals/supplement. Weight gain.        Nutrition Monitoring and Evaluation:   Food/Nutrient Intake Outcomes:  Food and Nutrient Intake, Supplement Intake  Physical Signs/Symptoms Outcomes:  Weight, Biochemical Data     Discharge Planning:    Continue Oral Nutrition Supplement     Electronically signed by Damion Espinal RD, LD on 2/10/21 at 11:04 AM EST

## 2021-02-10 NOTE — FLOWSHEET NOTE
Perfect-served message sent to Dr Rica Huerta in regards to patient's sodium level 126 and potassium level 3.3 at 1220 Awaiting further orders.

## 2021-02-10 NOTE — FLOWSHEET NOTE
Spoke with patients natalie Crane to update on plan of care in regards to discharge planning. He continues to verbalize that he does not feel it is safe for his mom to go home and is interested in a acute rehab setting temporarily. Will notify Rodrigue Tony to call natalie Crane for discharge planning.

## 2021-02-10 NOTE — PROGRESS NOTES
Nephrology Progress Note    Assessment:  Hyponatremia  Encephalopathy  Hx ETOH use frequent  Anemia      Plan: restrict  Fluids at present  And at discharge     Patient Active Problem List:     Hyponatremia     HTN (hypertension)     Hypokalemia     Alcohol use     Elevated liver enzymes     Encephalopathy acute     Metabolic encephalopathy      Subjective:  Admit Date: 2/6/2021    Interval History: no issues    Medications:  Scheduled Meds:   vitamin D3  2,000 Units Oral Daily    sodium chloride flush  10 mL Intravenous 2 times per day    enoxaparin  40 mg Subcutaneous Daily    thiamine  100 mg Oral Daily    multivitamin  1 tablet Oral Daily    folic acid  1 mg Oral Daily    amLODIPine  5 mg Oral Daily     Continuous Infusions:    CBC:   Recent Labs     02/08/21  0500 02/09/21  0454   WBC 5.3 5.6   HGB 11.4* 12.7    364     CMP:    Recent Labs     02/08/21  1619 02/09/21  0454 02/09/21  1608    136 132*   K 4.3 4.4 4.2    106 98   CO2 26 25 26   BUN 5* 10 12   CREATININE 0.34* 0.42* 0.49*   GLUCOSE 104* 87 97   CALCIUM 8.5 8.9 8.5   LABGLOM >60.0 >60.0 >60.0     Troponin: No results for input(s): TROPONINI in the last 72 hours. BNP: No results for input(s): BNP in the last 72 hours. INR: No results for input(s): INR in the last 72 hours. Lipids: No results for input(s): CHOL, LDLDIRECT, TRIG, HDL, AMYLASE, LIPASE in the last 72 hours. Liver:   Recent Labs     02/09/21  0454   AST 31   ALT 23   ALKPHOS 69   PROT 6.3   LABALBU 3.1*   BILITOT <0.2     Iron:  No results for input(s): IRONS, FERRITIN in the last 72 hours. Invalid input(s): LABIRONS  Urinalysis: No results for input(s): UA in the last 72 hours.     Objective:  Vitals: /81   Pulse 72   Temp 98.1 °F (36.7 °C) (Oral)   Resp 16   Ht 5' (1.524 m)   Wt 80 lb (36.3 kg)   LMP  (LMP Unknown)   SpO2 97%   BMI 15.62 kg/m²    Wt Readings from Last 3 Encounters:   02/06/21 80 lb (36.3 kg)   12/20/19 90 lb (40.8 kg) 12/13/19 100 lb (45.4 kg)      24HR INTAKE/OUTPUT:      Intake/Output Summary (Last 24 hours) at 2/10/2021 4903  Last data filed at 2/9/2021 2103  Gross per 24 hour   Intake 1050 ml   Output 100 ml   Net 950 ml       General: alert, in no apparent distress  HEENT: normocephalic, atraumatic, anicteric  Neck: supple, no mass  Lungs: non-labored respirations, clear to auscultation bilaterally  Heart: regular rate and rhythm, no murmurs or rubs  Abdomen: soft, non-tender, non-distended  Ext: no cyanosis, no peripheral edema  Neuro: alert and oriented, no gross abnormalities  Psych: normal mood and affect  Skin: no rash      Electronically signed by Prema Manzo MD

## 2021-02-10 NOTE — FLOWSHEET NOTE
Discharge instructions given to patient and daughter Vaughn Villegas at bedside. Script given for labs. Reviewed follow up about with PCP. Education given on new medication and the importance of fluid restriction. Vaughn Villegas verbalized understanding.

## 2021-02-10 NOTE — PROGRESS NOTES
[facility] Neurology Daily Progress Note  Name: Demar Corey  Age: 61 y.o. Gender: female  CodeStatus: Full Code  Allergies: Zantac [Ranitidine Hcl]    Chief Complaint:Fatigue ( Rectal bleeding x 1 week, not eating. )    Primary Care Provider: No primary care provider on file. InpatientTreatment Team: Treatment Team: Attending Provider: Dudley Moreira DO; Consulting Physician: Prema Manzo DO; Utilization Reviewer: Rosabla Willis RN; Consulting Physician: Lisa Aldrich MD; Utilization Reviewer: Gayatri Cooley RN; Consulting Physician: Marciano San MD; : Lula Meyer; Registered Nurse: Merary Jackson, RN; Registered Nurse: Brandi Fontaine RN  Admission Date: 2/6/2021      HPI   Pt seen and examined for neuro follow up for encephalopathy in the setting of hyponatremia. Patient currently alert and oriented x2, no acute distress, cooperative. Bizarre affect. No focal neuro deficits. No seizure activity. Encephalopathy improved. No behavioral disturbances. Hyponatremia worse today. Patient also with hypokalemia. Not confused     Vitals:    02/10/21 0825   BP:    Pulse: 72   Resp:    Temp:    SpO2:       Review of Systems   Constitutional: Negative for appetite change, chills, fatigue and fever. HENT: Negative for hearing loss and trouble swallowing. Eyes: Negative for visual disturbance. Respiratory: Negative for cough, chest tightness, shortness of breath and wheezing. Cardiovascular: Negative for chest pain, palpitations and leg swelling. Gastrointestinal: Negative for nausea and vomiting. Musculoskeletal: Negative for gait problem. Skin: Negative for color change and rash. Neurological: Negative for dizziness, tremors, seizures, syncope, facial asymmetry, speech difficulty, weakness, light-headedness, numbness and headaches. Psychiatric/Behavioral: Positive for confusion. Negative for agitation and hallucinations. The patient is not nervous/anxious. Physical Exam  Vitals signs and nursing note reviewed. Constitutional:       General: She is not in acute distress. Appearance: She is not diaphoretic. HENT:      Head: Normocephalic and atraumatic. Eyes:      Extraocular Movements: Extraocular movements intact. Pupils: Pupils are equal, round, and reactive to light. Cardiovascular:      Rate and Rhythm: Normal rate and regular rhythm. Pulmonary:      Effort: Pulmonary effort is normal. No respiratory distress. Breath sounds: Normal breath sounds. Abdominal:      General: Bowel sounds are normal.      Palpations: Abdomen is soft. Skin:     General: Skin is warm and dry. Neurological:      General: No focal deficit present. Mental Status: She is alert. She is disoriented. Cranial Nerves: No cranial nerve deficit. Motor: No weakness, tremor or seizure activity.       Coordination: Coordination normal.      Gait: Gait normal.               Medications:  Reviewed    Infusion Medications:   Scheduled Medications:    vitamin D3  2,000 Units Oral Daily    sodium chloride flush  10 mL Intravenous 2 times per day    enoxaparin  40 mg Subcutaneous Daily    thiamine  100 mg Oral Daily    multivitamin  1 tablet Oral Daily    folic acid  1 mg Oral Daily    amLODIPine  5 mg Oral Daily     PRN Meds: sodium chloride flush, promethazine **OR** ondansetron, acetaminophen **OR** acetaminophen, LORazepam **OR** LORazepam **OR** LORazepam **OR** LORazepam **OR** LORazepam **OR** LORazepam **OR** LORazepam **OR** LORazepam    Labs:   Recent Labs     02/08/21  0500 02/09/21  0454   WBC 5.3 5.6   HGB 11.4* 12.7   HCT 33.1* 37.3    364     Recent Labs     02/09/21  0454 02/09/21  1608 02/10/21  1220    132* 126*   K 4.4 4.2 3.2*    98 93*   CO2 25 26 25   BUN 10 12 12   CREATININE 0.42* 0.49* 0.56   CALCIUM 8.9 8.5 8.6   PHOS 3.7  --   --      Recent Labs     02/08/21  0500 02/09/21  0454   AST 38* 31   ALT 24 23 BILITOT <0.2 <0.2   ALKPHOS 64 69     No results for input(s): INR in the last 72 hours. No results for input(s): Naranjito Ripper in the last 72 hours. Urinalysis:   Lab Results   Component Value Date    NITRU Negative 02/06/2021    BLOODU Negative 02/06/2021    SPECGRAV 1.010 02/06/2021    GLUCOSEU Negative 02/06/2021       Radiology:   Most recent    EEG No procedure found. MRI of Brain No results found for this or any previous visit. No results found for this or any previous visit. MRA of the Head and Neck: No results found for this or any previous visit. No results found for this or any previous visit. No results found for this or any previous visit. CT of the Head:   Results for orders placed during the hospital encounter of 02/06/21   CT HEAD WO CONTRAST    Narrative EXAMINATION:  CT HEAD WO CONTRAST    HISTORY:   confused. r/o structural lesion, prior CVA   E87.1 Hyponatremia ICD10    TECHNIQUE:  Serial axial images without IV contrast were obtained from the vertex to the foramen magnum. Sagittal and coronal reconstructions. All CT scans at this facility use dose modulation, iterative reconstruction, and/or weight based dosing when appropriate to reduce radiation dose to as low as reasonably achievable. COMPARISON:  CT 12/13/2019. RESULT:    Acute change:   No evidence of an acute infarct or other acute parenchymal process. Hemorrhage:    No evidence of acute intracranial hemorrhage. Mass Lesion / Mass Effect:   There is no evidence of an intracranial mass or extraaxial fluid collection. No significant mass effect. Chronic change:   Scattered patchy foci of low attenuation are present within supratentorial white matter which is a nonspecific finding but likely represents mild microvascular ischemia. Vascular calcifications. Parenchyma: There is moderate generalized volume loss.   The brain parenchyma is otherwise within Certified in Vascular Neurology  Board Certified in Neuromuscular Medicine  Certified in Neurorehabilitation     Collaborating physicians: Dr Abdulkadir Anne    Electronically signed by ELSA Mejia CNP on 2/10/2021 at 1:32 PM

## 2021-02-10 NOTE — DISCHARGE INSTR - DIET

## 2021-02-10 NOTE — PROGRESS NOTES
Physician Progress Note    2/10/2021   1:42 PM    Name:  Meghan Velasco  MRN:    [id number]      Day: 4     Admit Date: 2/6/2021  8:39 PM  PCP: No primary care provider on file.     Code Status:  Full Code    Subjective:     No complaints    Current Facility-Administered Medications   Medication Dose Route Frequency Provider Last Rate Last Admin    Vitamin D (CHOLECALCIFEROL) tablet 2,000 Units  2,000 Units Oral Daily Seagraves How, DO   2,000 Units at 02/10/21 1226    sodium chloride tablet 2 g  2 g Oral Once Seagraves How, DO        sodium chloride flush 0.9 % injection 10 mL  10 mL Intravenous 2 times per day Loreda Kail, APRN - CNP   Stopped at 02/09/21 0844    sodium chloride flush 0.9 % injection 10 mL  10 mL Intravenous PRN Loreda Kail, APRN - CNP        enoxaparin (LOVENOX) injection 40 mg  40 mg Subcutaneous Daily Loreda Kail, APRN - CNP   40 mg at 02/07/21 1323    promethazine (PHENERGAN) tablet 12.5 mg  12.5 mg Oral Q6H PRN Loreda Kail, APRN - CNP        Or    [facility]) injection 4 mg  4 mg Intravenous Q6H PRN Loreda Kail, APRN - CNP        acetaminophen (TYLENOL) tablet 650 mg  650 mg Oral Q6H PRN Loreda Kail, APRN - CNP        Or    acetaminophen (TYLENOL) suppository 650 mg  650 mg Rectal Q6H PRN Loreda Kail, APRN - CNP        thiamine tablet 100 mg  100 mg Oral Daily Loreda Kail, APRN - CNP   100 mg at 02/10/21 0090    multivitamin 1 tablet  1 tablet Oral Daily Loreda Kail, APRN - CNP   1 tablet at 42/16/73 9246    folic acid (FOLVITE) tablet 1 mg  1 mg Oral Daily Loreda Kail, APRN - CNP   1 mg at 02/10/21 0827    LORazepam (ATIVAN) tablet 1 mg  1 mg Oral Q1H PRN Loreda Kail, APRN - CNP   1 mg at 02/08/21 0056    Or    LORazepam (ATIVAN) injection 1 mg  1 mg Intravenous Q1H PRN Loreda Kail, APRN - CNP        Or    LORazepam (ATIVAN) tablet 2 mg  2 mg Oral Q1H PRN Jinnie Reason, APRN - CNP        Or    LORazepam (ATIVAN) injection 2 mg  2 mg Intravenous Q1H PRN Jinnie Reason, APRN - CNP        Or    LORazepam (ATIVAN) tablet 3 mg  3 mg Oral Q1H PRN Jinnie Reason, APRN - CNP        Or    LORazepam (ATIVAN) injection 3 mg  3 mg Intravenous Q1H PRN Jinnie Reason, APRN - CNP        Or    LORazepam (ATIVAN) tablet 4 mg  4 mg Oral Q1H PRN Jinnie Reason, APRN - CNP        Or    LORazepam (ATIVAN) injection 4 mg  4 mg Intravenous Q1H PRN Jinnie Reason, APRN - CNP        amLODIPine (NORVASC) tablet 5 mg  5 mg Oral Daily Sin Lazar, APRN - CNP   5 mg at 02/10/21 0937       Physical Examination:      Vitals:  /81   Pulse 72   Temp 98.1 °F (36.7 °C) (Oral)   Resp 16   Ht 5' (1.524 m)   Wt 83 lb 11.2 oz (38 kg)   LMP  (LMP Unknown)   SpO2 97%   BMI 16.35 kg/m²   Temp (24hrs), Av.1 °F (36.7 °C), Min:98.1 °F (36.7 °C), Max:98.1 °F (36.7 °C)      General appearance: Chronically ill-appearing. Poor dentition. Frail. Oriented x3, follows commands, answers questions but not capable of complex discussion. Lungs: clear to auscultation bilaterally, normal effort  Heart: regular rate and rhythm, no murmur  Abdomen: soft, nontender, nondistended, bowel sounds present, no masses  Extremities: no edema, redness, tenderness in the calves  Skin: no gross lesions, rashes    Data:     Labs:  Recent Labs     21  0500 21  0454   WBC 5.3 5.6   HGB 11.4* 12.7    364     Recent Labs     21  1608 02/10/21  1220   * 126*   K 4.2 3.2*   CL 98 93*   CO2 26 25   BUN 12 12   CREATININE 0.49* 0.56   GLUCOSE 97 71     Recent Labs     21  0500 21  0454   AST 38* 31   ALT 24 23   BILITOT <0.2 <0.2   ALKPHOS 64 69       Assessment and Plan:        1. Hyponatremia- unclear if SIADH or related to [facility]. Managed by nephrology.   Will give additional salt tablets today. Okay to discharge if okay with nephrology. 2.  Metabolic encephalopathy secondary to above with possible contribution from alcohol use disorder, underlying psychiatric disorder: Improved  -CT head reviewed  -MMA pending-supplement B12 if low  -Appreciate psychiatric evaluation   -neuro following  -PT/OT-we will order home care at discharge  -Recommend cessation of tobacco and alcohol    3.   Vitamin D deficiency: Supplement    Hypokalemia  Hypomagnesemia  Tobacco use disorder  Alcohol use disorder Supplementing vitamins  Severe protein calorie malnutrition    Diet: DIET GENERAL; Daily Fluid Restriction: 1500 ml  Dietary Nutrition Supplements: Frozen Oral Supplement  Dietary Nutrition Supplements: Standard High Calorie Oral Supplement  Ppx: Lovenox  Full Code    Discharge home with home care today if cleared by consultants    >35 minutes in total care time    Electronically signed by Clemon Schilder, DO on 2/10/2021 at 1:42 PM

## 2021-02-10 NOTE — FLOWSHEET NOTE
Perfect serve message sent to Dr Deangelo Phoenix in regards to low sodium and potassium level Further awaiting orders.

## 2021-02-10 NOTE — PROGRESS NOTES
Physical Therapy Med Surg Daily Treatment Note  Facility/Department: [facility]  Room: 23/N223-       NAME: Burak Hunt  : 1960 (61 y.o.)  MRN: [id number]  CODE STATUS: Full Code    Date of Service: 2/10/2021    Patient Diagnosis(es): Hyponatremia [E87.1]   Chief Complaint   Patient presents with    Fatigue      Rectal bleeding x 1 week, not eating. Patient Active Problem List    Diagnosis Date Noted    Metabolic encephalopathy     Encephalopathy acute     HTN (hypertension) 2021    Hypokalemia 2021    Alcohol use 2021    Elevated liver enzymes 2021    Hyponatremia 2021        Past Medical History:   Diagnosis Date    [facility]     [facility]     ETOH abuse      History reviewed. No pertinent surgical history. Restrictions  Restrictions/Precautions: Fall Risk(High per [employer])    SUBJECTIVE   General  Chart Reviewed: Yes  Family / Caregiver Present: No  Subjective  Subjective: \"I've been moving okay. \"  General Comment  Comments: confusion at times; talks off topic    Pre-Session Pain Report  Pre Treatment Pain Screening  Pain at present: 0  Scale Used: Numeric Score  Intervention List: Patient able to continue with treatment  Pain Screening  Patient Currently in Pain: No       Post-Session Pain Report  Pain Assessment  Pain Assessment: 0-10  Pain Level: 0         OBJECTIVE        Bed mobility  Supine to Sit: Independent    Transfers  Sit to Stand: Supervision  Stand to sit: Supervision  Bed to Chair: Supervision  Comment: SPV due to distractable with decreased safety awareness    Ambulation  Ambulation?: Yes  Ambulation 1  Surface: level tile  Device: No Device  Assistance: Supervision  Quality of Gait: slowed speed of swing R LE compared to L LE  Distance: 150'    Stairs/Curb  Stairs?: Yes  Stairs  # Steps : 12  Stairs Height: 6\"  Rails: Left ascending  Device: No Device  Assistance: Stand by assistance  Comment: pt completes reciprocally without cues. steady/safe. Activity Tolerance  Activity Tolerance: Patient Tolerated treatment well          ASSESSMENT   Assessment: pt with good ability on the stairs, easily distracted. Discharge Recommendations:  Continue to assess pending progress, Patient would benefit from continued therapy after discharge    Goals  Long term goals  Long term goal 1: Pt will demonstrate bed mobility indep with good safety awareness for safe d/c home. Long term goal 2: Pt will demonstrate transfer no AD indep without LOB for safe d/c home. Long term goal 3: Pt will demonstrate amb > or = 200ft no AD indep to allow pt to safely amb inside her home. Long term goal 4: Pt will demonstrate improved standing balance evidenced by 22/24 on DGI indicated no risk for falls. Long term goal 5: Pt will demonstrate stair negotiation 1 flight with 1 rail to allow pt to go to second floor of apartment building where washer/ dryer is located. PLAN    Times per week: 3-6  Safety Devices  Type of devices: Call light within reach, Chair alarm in place, Left in chair     ZULLY (6 CLICK) Nancy 95 Raw Score : 20      Therapy Time   Individual   Time In 1020   Time Out 1037   Minutes 17      BM/Trsf: 4  Gait: [address], [facility], 02/10/21 at 10:49 AM         Definitions for assistance levels  Independent = pt does not require any physical supervision or assistance from another person for activity completion. Device may be needed.   Stand by assistance = pt requires verbal cues or instructions from another person, close to but not touching, to perform the activity  Minimal assistance= pt performs 75% or more of the activity; assistance is required to complete the activity  Moderate assistance= pt performs 50% of the activity; assistance is required to complete the activity  Maximal assistance = pt performs 25% of the activity; assistance is required to complete the activity  Dependent = pt requires total physical assistance to accomplish the task

## 2021-02-10 NOTE — CARE COORDINATION
LSW spoke with the pt's son Luis Guillen regarding the DC needs for pt. Luis Guillen does not live with the pt but he helps her and takes her to her medical appts. Luis Guillen would like her to go to rehab here prior to home. LSW called [facility] and in review of the therapy notes the pt is too good for insurance to approve her for the acute rehab. LSW to speak with family regarding another plan for DC. LSW spoke with son and daughter to discuss DC plan. They are going to discuss as a family. Daughter feels that home with [facility] would be best.  Son feels SNF would be better. They will come to a decision and let LSW know the plan.

## 2021-02-10 NOTE — FLOWSHEET NOTE
Patient was up at about 0400 trying to adjust bed, she had salad all in her bed and garbage all over the floor. Bed alarm maintained. Refused morning labs again. Unconvinced regarding the need for them.

## 2021-02-10 NOTE — PROGRESS NOTES
Physician Progress Note    2/9/2021   7:21 PM    Name:  Braxton Spann  MRN:    [id number]      Day: 3     Admit Date: 2/6/2021  8:39 PM  PCP: No primary care provider on file.     Code Status:  Full Code    Subjective:     No complaints    Current Facility-Administered Medications   Medication Dose Route Frequency Provider Last Rate Last Admin    sodium chloride flush 0.9 % injection 10 mL  10 mL Intravenous 2 times per day Mallissa Snowball, APRN - CNP   Stopped at 02/09/21 0844    sodium chloride flush 0.9 % injection 10 mL  10 mL Intravenous PRN Mallissa Snowball, APRN - CNP        enoxaparin (LOVENOX) injection 40 mg  40 mg Subcutaneous Daily Mallissa Snowball, APRN - CNP   40 mg at 02/07/21 1323    promethazine (PHENERGAN) tablet 12.5 mg  12.5 mg Oral Q6H PRN Mallissa Snowball, APRN - CNP        Or    ondansetron [facility]) injection 4 mg  4 mg Intravenous Q6H PRN Mallissa Snowball, APRN - CNP        acetaminophen (TYLENOL) tablet 650 mg  650 mg Oral Q6H PRN Mallissa Snowball, APRN - CNP        Or    acetaminophen (TYLENOL) suppository 650 mg  650 mg Rectal Q6H PRN Mallissa Snowball, APRN - CNP        thiamine tablet 100 mg  100 mg Oral Daily Mallissa Snowball, APRN - CNP   100 mg at 02/09/21 4643    multivitamin 1 tablet  1 tablet Oral Daily Mallissa Snowball, APRN - CNP   1 tablet at 92/78/90 6370    folic acid (FOLVITE) tablet 1 mg  1 mg Oral Daily Mallissa Snowball, APRN - CNP   1 mg at 02/09/21 8698    LORazepam (ATIVAN) tablet 1 mg  1 mg Oral Q1H PRN Mallissa Snowball, APRN - CNP   1 mg at 02/08/21 1959    Or    LORazepam (ATIVAN) injection 1 mg  1 mg Intravenous Q1H PRN Mallissa Snowball, APRN - CNP        Or    LORazepam (ATIVAN) tablet 2 mg  2 mg Oral Q1H PRN Mallissa Snowball, APRN - CNP        Or    LORazepam (ATIVAN) injection 2 mg  2 mg Intravenous Q1H PRN Mallissa Snowball, APRN - CNP        Or   [place]

## 2021-02-10 NOTE — FLOWSHEET NOTE
Spoke with Dr Eloisa Noriega via phone. He is concerned with patient's safety at home. This nurse did explain that family decided to take patient home with [address] [facility] facility. Okay for discharge from nephrology stand point. Dr Bonnie Mccarty ordered BMP to be drawn on 2/15.  Pt will follow up with PCP as schedule 2/17/2021

## 2021-02-10 NOTE — FLOWSHEET NOTE
Patient has difficulties with completing thoughts and has poor retention about things that were discussed earlier. She initially was cooperative with letting this nurse reapply tele monitor and stated she understood why she needed it but then woke up during the night and asked what it was and saying \" they've collected enough information and it's not needed anymore. \" She also refused 0000 lab draw and this nurse encouraged her to have it drawn in the morning and she said \"I've let them draw blood the last several days and they keep telling me I can go home. Enough is enough. I did not come here for this. \" Hu Early to listen to reasoning regarding her sodium and liver enzymes being elevated. Safety measures maintained.

## 2021-02-11 NOTE — DISCHARGE SUMMARY
[facility] Medicine Discharge Summary    Bert Taylor  :  1960  MRN:  [id number]    Admit date:  2021  Discharge date:  2021    Admitting Physician: Karlo Orocso MD  Primary Care Physician:  Quinton Pena MD    Discharge Diagnoses:    Principal Problem:    Hyponatremia  Active Problems:    HTN (hypertension)    Hypokalemia    Alcohol use    Elevated liver enzymes    Encephalopathy acute    Metabolic encephalopathy  Resolved Problems:    * No resolved hospital problems. *    Chief Complaint   Patient presents with    Fatigue      Rectal bleeding x 1 week, not eating. Condition: improved  Activity:  no strenuous activity   Diet: regular  Disposition: home w home care  Functional Status: ambulatory with assistance    Significant Findings:     Labs Renal Latest Ref Rng & Units 2/10/2021 2021 2021 2021 2021   BUN 8 - 23 mg/dL 12 12 10 5(L) 7(L)   Cr 0.50 - 0.90 mg/dL 0.56 0.49(L) 0.42(L) 0.34(L) 0.38(L)   K 3.4 - 4.9 mEq/L 3.2(L) 4.2 4.4 4.3 3.0(LL)   Na 135 - 144 mEq/L 126(L) 132(L) 136 135 125(L)     CT Head: No acute intracranial process. MMA level pending    Hospital Course:   63yo F PMH cervical CA, etOH use disorder presented with generalized weakness, fatigue, and confusion. She was found to have metabolic encephalopathy due to hyponatremia with possible contribution from alcohol use disorder, underlying psychiatric disorder. The hyponatremia cause was unclear- possible related to [facility] or [facility]- this was managed by nephrology and improved during the hospitalization. She was also seen by neurology and psychiatry who did not recommend further inpatient evaluation. There was mention of possible rectal bleeding on admission however no bleeding was observed during the hospitalization and Hb was stable. Home care was ordered at discharge. We recommended complete cessation of tobacco and alcohol.  She was given Vitamin D supplement and MMA level is pending- if low, she should be given B12 supplement as well. Exam on Discharge:   /81   Pulse 72   Temp 98.1 °F (36.7 °C) (Oral)   Resp 16   Ht 5' (1.524 m)   Wt 83 lb 11.2 oz (38 kg)   LMP  (LMP Unknown)   SpO2 97%   BMI 16.35 kg/m²   General appearance: alert, cooperative and no distress. Thin. Answers questions and oriented x 3 but poor memory- not capable of complex discussion. Lungs: clear to auscultation bilaterally, normal effort  Heart: regular rate and rhythm, no murmur  Abdomen: soft, nontender, nondistended, bowel sounds present, no masses  Extremities: no edema, redness, tenderness in the calves  Skin: no gross lesions, rashes    Discharge Medication List:   Lindy Everett   Home Medication Instructions [facility]:[id number]    Printed on:02/11/21 1015   Medication Information                      amLODIPine (NORVASC) 2.5 MG tablet  Take 2.5 mg by mouth daily             ammonium lactate (LAC-HYDRIN) 12 % lotion  Apply topically as needed for Dry Skin Apply topically as needed.              potassium chloride (KLOR-CON M) 20 MEQ extended release tablet  Take 1 tablet by mouth daily             Vitamin D (CHOLECALCIFEROL) 50 MCG (2000 UT) TABS tablet  Take 1 tablet by mouth daily                 DC time 37 minutes    Signed:  Dudley Moreira  2/11/2021, 10:15 AM

## 2021-02-12 LAB — METHYLMALONIC ACID: 0.55 UMOL/L (ref 0–0.4)

## 2021-02-12 NOTE — PROGRESS NOTES
Physical Therapy  Facility/Department: [facility] [id number]  Physical Therapy Discharge      NAME: Mathew Emerson    : 1960 (61 y.o.)  MRN: [id number]    Account: [de-identified]  Gender: female      Patient has been discharged from acute care hospital. DC patient from current PT program.      Electronically signed by Jesse Martinez PT on 21 at 4:11 PM EST

## 2021-02-17 LAB — PTH RELATED PEPTIDE: <2 PMOL/L (ref 0–3.4)

## 2021-02-28 ENCOUNTER — APPOINTMENT (OUTPATIENT)
Dept: ULTRASOUND IMAGING | Age: 61
DRG: 426 | End: 2021-02-28
Payer: COMMERCIAL

## 2021-02-28 ENCOUNTER — APPOINTMENT (OUTPATIENT)
Dept: GENERAL RADIOLOGY | Age: 61
DRG: 426 | End: 2021-02-28
Payer: COMMERCIAL

## 2021-02-28 ENCOUNTER — APPOINTMENT (OUTPATIENT)
Dept: CT IMAGING | Age: 61
DRG: 426 | End: 2021-02-28
Payer: COMMERCIAL

## 2021-02-28 ENCOUNTER — HOSPITAL ENCOUNTER (INPATIENT)
Age: 61
LOS: 2 days | Discharge: HOME | DRG: 426 | End: 2021-03-02
Attending: INTERNAL MEDICINE | Admitting: INTERNAL MEDICINE
Payer: COMMERCIAL

## 2021-02-28 DIAGNOSIS — E83.42 HYPOMAGNESEMIA: ICD-10-CM
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
## 2021-02-28 DIAGNOSIS — E87.6 HYPOKALEMIA: ICD-10-CM

## 2021-02-28 DIAGNOSIS — E87.1 HYPONATREMIA: Primary | ICD-10-CM

## 2021-02-28 PROBLEM — R29.6 REPEATED FALLS: Status: ACTIVE | Noted: 2021-02-28

## 2021-02-28 LAB
ALBUMIN SERPL-MCNC: 3.9 G/DL (ref 3.5–4.6)
ALP BLD-CCNC: 107 U/L (ref 40–130)
ALT SERPL-CCNC: 30 U/L (ref 0–33)
AMMONIA: 18 UMOL/L (ref 11–51)
ANION GAP SERPL CALCULATED.3IONS-SCNC: 12 MEQ/L (ref 9–15)
APTT: 32.8 SEC (ref 24.4–36.8)
AST SERPL-CCNC: 46 U/L (ref 0–35)
BASOPHILS ABSOLUTE: 0 K/UL (ref 0–0.2)
BASOPHILS RELATIVE PERCENT: 0.4 %
BILIRUB SERPL-MCNC: 0.7 MG/DL (ref 0.2–0.7)
BUN BLDV-MCNC: 8 MG/DL (ref 8–23)
CA 125: 6.9 U/ML (ref 0–35)
CALCIUM SERPL-MCNC: 9.5 MG/DL (ref 8.5–9.9)
CEA: 4.7 NG/ML (ref 0–5.5)
CHLORIDE BLD-SCNC: 85 MEQ/L (ref 95–107)
CK MB: 35.6 NG/ML (ref 0–3.8)
CO2: 26 MEQ/L (ref 20–31)
CREAT SERPL-MCNC: 0.33 MG/DL (ref 0.5–0.9)
CREATINE KINASE-MB INDEX: 6.5 % (ref 0–3.5)
EOSINOPHILS ABSOLUTE: 0 K/UL (ref 0–0.7)
EOSINOPHILS RELATIVE PERCENT: 0 %
ETHANOL PERCENT: NORMAL G/DL
ETHANOL: <10 MG/DL (ref 0–0.08)
GAMMA GLUTAMYL TRANSFERASE: 17 U/L (ref 0–40)
GFR AFRICAN AMERICAN: >60
GFR NON-AFRICAN AMERICAN: >60
GLOBULIN: 3.7 G/DL (ref 2.3–3.5)
GLUCOSE BLD-MCNC: 86 MG/DL (ref 70–99)
HAV IGM SER IA-ACNC: NORMAL
HCT VFR BLD CALC: 36.5 % (ref 37–47)
HEMOGLOBIN: 12.6 G/DL (ref 12–16)
HEPATITIS B CORE IGM ANTIBODY: NORMAL
HEPATITIS B SURFACE ANTIGEN INTERPRETATION: NORMAL
HEPATITIS C ANTIBODY INTERPRETATION: NORMAL
HEPATITIS INTERPRETATION:: NORMAL
INR BLD: 0.9
LACTIC ACID: 1 MMOL/L (ref 0.5–2.2)
LIPASE: 44 U/L (ref 12–95)
LYMPHOCYTES ABSOLUTE: 1.2 K/UL (ref 1–4.8)
LYMPHOCYTES RELATIVE PERCENT: 12.5 %
MAGNESIUM: 1.6 MG/DL (ref 1.7–2.4)
MCH RBC QN AUTO: 33.3 PG (ref 27–31.3)
MCHC RBC AUTO-ENTMCNC: 34.4 % (ref 33–37)
MCV RBC AUTO: 96.9 FL (ref 82–100)
MONOCYTES ABSOLUTE: 1.4 K/UL (ref 0.2–0.8)
MONOCYTES RELATIVE PERCENT: 15 %
NEUTROPHILS ABSOLUTE: 6.8 K/UL (ref 1.4–6.5)
NEUTROPHILS RELATIVE PERCENT: 72.1 %
PDW BLD-RTO: 13.1 % (ref 11.5–14.5)
PLATELET # BLD: 450 K/UL (ref 130–400)
POTASSIUM SERPL-SCNC: 3.2 MEQ/L (ref 3.4–4.9)
PRO-BNP: 366 PG/ML
PROCALCITONIN: 0.07 NG/ML (ref 0–0.15)
PROTHROMBIN TIME: 12.7 SEC (ref 12.3–14.9)
RBC # BLD: 3.77 M/UL (ref 4.2–5.4)
SODIUM BLD-SCNC: 123 MEQ/L (ref 135–144)
TOTAL CK: 545 U/L (ref 0–170)
TOTAL PROTEIN: 7.6 G/DL (ref 6.3–8)
TROPONIN: <0.01 NG/ML (ref 0–0.01)
WBC # BLD: 9.4 K/UL (ref 4.8–10.8)

## 2021-02-28 PROCEDURE — 82077 ASSAY SPEC XCP UR&BREATH IA: CPT

## 2021-02-28 PROCEDURE — 70450 CT HEAD/BRAIN W/O DYE: CPT

## 2021-02-28 PROCEDURE — 93971 EXTREMITY STUDY: CPT

## 2021-02-28 PROCEDURE — 82140 ASSAY OF AMMONIA: CPT

## 2021-02-28 PROCEDURE — 82553 CREATINE MB FRACTION: CPT

## 2021-02-28 PROCEDURE — 85610 PROTHROMBIN TIME: CPT

## 2021-02-28 PROCEDURE — 2580000003 HC RX 258: Performed by: PHYSICIAN ASSISTANT

## 2021-02-28 PROCEDURE — 71275 CT ANGIOGRAPHY CHEST: CPT

## 2021-02-28 PROCEDURE — 2060000000 HC ICU INTERMEDIATE R&B

## 2021-02-28 PROCEDURE — 83735 ASSAY OF MAGNESIUM: CPT

## 2021-02-28 PROCEDURE — 74177 CT ABD & PELVIS W/CONTRAST: CPT

## 2021-02-28 PROCEDURE — 6360000004 HC RX CONTRAST MEDICATION: Performed by: PHYSICIAN ASSISTANT

## 2021-02-28 PROCEDURE — 86301 IMMUNOASSAY TUMOR CA 19-9: CPT

## 2021-02-28 PROCEDURE — 85025 COMPLETE CBC W/AUTO DIFF WBC: CPT

## 2021-02-28 PROCEDURE — 96374 THER/PROPH/DIAG INJ IV PUSH: CPT

## 2021-02-28 PROCEDURE — 82550 ASSAY OF CK (CPK): CPT

## 2021-02-28 PROCEDURE — 6370000000 HC RX 637 (ALT 250 FOR IP): Performed by: PHYSICIAN ASSISTANT

## 2021-02-28 PROCEDURE — 6360000002 HC RX W HCPCS: Performed by: PHYSICIAN ASSISTANT

## 2021-02-28 PROCEDURE — 84484 ASSAY OF TROPONIN QUANT: CPT

## 2021-02-28 PROCEDURE — 87040 BLOOD CULTURE FOR BACTERIA: CPT

## 2021-02-28 PROCEDURE — 80053 COMPREHEN METABOLIC PANEL: CPT

## 2021-02-28 PROCEDURE — 86304 IMMUNOASSAY TUMOR CA 125: CPT

## 2021-02-28 PROCEDURE — 83880 ASSAY OF NATRIURETIC PEPTIDE: CPT

## 2021-02-28 PROCEDURE — 81003 URINALYSIS AUTO W/O SCOPE: CPT

## 2021-02-28 PROCEDURE — 82378 CARCINOEMBRYONIC ANTIGEN: CPT

## 2021-02-28 PROCEDURE — 83690 ASSAY OF LIPASE: CPT

## 2021-02-28 PROCEDURE — 84145 PROCALCITONIN (PCT): CPT

## 2021-02-28 PROCEDURE — 87635 SARS-COV-2 COVID-19 AMP PRB: CPT

## 2021-02-28 PROCEDURE — 99283 EMERGENCY DEPT VISIT LOW MDM: CPT

## 2021-02-28 PROCEDURE — 73630 X-RAY EXAM OF FOOT: CPT

## 2021-02-28 PROCEDURE — 36415 COLL VENOUS BLD VENIPUNCTURE: CPT

## 2021-02-28 PROCEDURE — 85730 THROMBOPLASTIN TIME PARTIAL: CPT

## 2021-02-28 PROCEDURE — 80074 ACUTE HEPATITIS PANEL: CPT

## 2021-02-28 PROCEDURE — 82977 ASSAY OF GGT: CPT

## 2021-02-28 PROCEDURE — 83605 ASSAY OF LACTIC ACID: CPT

## 2021-02-28 RX ORDER — 0.9 % SODIUM CHLORIDE 0.9 %
1000 INTRAVENOUS SOLUTION INTRAVENOUS ONCE
Status: COMPLETED | OUTPATIENT
Start: 2021-02-28 | End: 2021-02-28

## 2021-02-28 RX ORDER — MAGNESIUM SULFATE IN WATER 40 MG/ML
4000 INJECTION, SOLUTION INTRAVENOUS ONCE
Status: COMPLETED | OUTPATIENT
Start: 2021-02-28 | End: 2021-03-01

## 2021-02-28 RX ADMIN — SODIUM CHLORIDE 1000 ML: 9 INJECTION, SOLUTION INTRAVENOUS at 21:35

## 2021-02-28 RX ADMIN — POTASSIUM BICARBONATE 20 MEQ: 782 TABLET, EFFERVESCENT ORAL at 23:16

## 2021-02-28 RX ADMIN — IOPAMIDOL 85 ML: 612 INJECTION, SOLUTION INTRAVENOUS at 22:47

## 2021-02-28 RX ADMIN — MAGNESIUM SULFATE HEPTAHYDRATE 4000 MG: 40 INJECTION, SOLUTION INTRAVENOUS at 23:16

## 2021-02-28 ASSESSMENT — PAIN DESCRIPTION - FREQUENCY: FREQUENCY: CONTINUOUS

## 2021-02-28 ASSESSMENT — ENCOUNTER SYMPTOMS
TROUBLE SWALLOWING: 0
ALLERGIC/IMMUNOLOGIC NEGATIVE: 1
SHORTNESS OF BREATH: 0
EYE PAIN: 0
ABDOMINAL PAIN: 0
COLOR CHANGE: 0
APNEA: 0

## 2021-02-28 ASSESSMENT — PAIN DESCRIPTION - LOCATION: LOCATION: HAND

## 2021-02-28 ASSESSMENT — PAIN SCALES - GENERAL: PAINLEVEL_OUTOF10: 6

## 2021-02-28 ASSESSMENT — PAIN DESCRIPTION - ORIENTATION: ORIENTATION: RIGHT;LEFT

## 2021-02-28 ASSESSMENT — PAIN DESCRIPTION - DESCRIPTORS: DESCRIPTORS: ACHING

## 2021-02-28 ASSESSMENT — PAIN DESCRIPTION - PAIN TYPE: TYPE: ACUTE PAIN

## 2021-03-01 PROBLEM — E43 SEVERE MALNUTRITION (HCC): Status: ACTIVE | Noted: 2021-03-01

## 2021-03-01 LAB
ANION GAP SERPL CALCULATED.3IONS-SCNC: 10 MEQ/L (ref 9–15)
ANION GAP SERPL CALCULATED.3IONS-SCNC: 9 MEQ/L (ref 9–15)
BILIRUBIN URINE: NEGATIVE
BLOOD, URINE: NEGATIVE
BUN BLDV-MCNC: 6 MG/DL (ref 8–23)
BUN BLDV-MCNC: 7 MG/DL (ref 8–23)
CALCIUM SERPL-MCNC: 8.4 MG/DL (ref 8.5–9.9)
CALCIUM SERPL-MCNC: 8.4 MG/DL (ref 8.5–9.9)
CHLORIDE BLD-SCNC: 94 MEQ/L (ref 95–107)
CHLORIDE BLD-SCNC: 96 MEQ/L (ref 95–107)
CLARITY: CLEAR
CO2: 24 MEQ/L (ref 20–31)
CO2: 26 MEQ/L (ref 20–31)
COLOR: YELLOW
CREAT SERPL-MCNC: 0.33 MG/DL (ref 0.5–0.9)
CREAT SERPL-MCNC: 0.41 MG/DL (ref 0.5–0.9)
GFR AFRICAN AMERICAN: >60
GFR AFRICAN AMERICAN: >60
GFR NON-AFRICAN AMERICAN: >60
GFR NON-AFRICAN AMERICAN: >60
GLUCOSE BLD-MCNC: 86 MG/DL (ref 70–99)
GLUCOSE BLD-MCNC: 98 MG/DL (ref 70–99)
GLUCOSE URINE: NEGATIVE MG/DL
KETONES, URINE: ABNORMAL MG/DL
LEUKOCYTE ESTERASE, URINE: NEGATIVE
LV EF: 50 %
LVEF MODALITY: NORMAL
MAGNESIUM: 2.3 MG/DL (ref 1.7–2.4)
NITRITE, URINE: NEGATIVE
PH UA: 7 (ref 5–9)
POTASSIUM SERPL-SCNC: 3 MEQ/L (ref 3.4–4.9)
POTASSIUM SERPL-SCNC: 3.1 MEQ/L (ref 3.4–4.9)
PROTEIN UA: NEGATIVE MG/DL
SARS-COV-2, NAAT: NOT DETECTED
SODIUM BLD-SCNC: 129 MEQ/L (ref 135–144)
SODIUM BLD-SCNC: 130 MEQ/L (ref 135–144)
SPECIFIC GRAVITY UA: 1.03 (ref 1–1.03)
TROPONIN: <0.01 NG/ML (ref 0–0.01)
TROPONIN: <0.01 NG/ML (ref 0–0.01)
URINE REFLEX TO CULTURE: ABNORMAL
UROBILINOGEN, URINE: 0.2 E.U./DL

## 2021-03-01 PROCEDURE — 2580000003 HC RX 258: Performed by: INTERNAL MEDICINE

## 2021-03-01 PROCEDURE — 93306 TTE W/DOPPLER COMPLETE: CPT

## 2021-03-01 PROCEDURE — 97163 PT EVAL HIGH COMPLEX 45 MIN: CPT

## 2021-03-01 PROCEDURE — 6360000002 HC RX W HCPCS: Performed by: INTERNAL MEDICINE

## 2021-03-01 PROCEDURE — 2060000000 HC ICU INTERMEDIATE R&B

## 2021-03-01 PROCEDURE — 6370000000 HC RX 637 (ALT 250 FOR IP): Performed by: NURSE PRACTITIONER

## 2021-03-01 PROCEDURE — 84484 ASSAY OF TROPONIN QUANT: CPT

## 2021-03-01 PROCEDURE — 99254 IP/OBS CNSLTJ NEW/EST MOD 60: CPT | Performed by: INTERNAL MEDICINE

## 2021-03-01 PROCEDURE — 36415 COLL VENOUS BLD VENIPUNCTURE: CPT

## 2021-03-01 PROCEDURE — 83735 ASSAY OF MAGNESIUM: CPT

## 2021-03-01 PROCEDURE — 0HBNXZZ EXCISION OF LEFT FOOT SKIN, EXTERNAL APPROACH: ICD-10-PCS | Performed by: INTERNAL MEDICINE

## 2021-03-01 PROCEDURE — 80048 BASIC METABOLIC PNL TOTAL CA: CPT

## 2021-03-01 PROCEDURE — 6360000002 HC RX W HCPCS: Performed by: NURSE PRACTITIONER

## 2021-03-01 PROCEDURE — 2580000003 HC RX 258: Performed by: NURSE PRACTITIONER

## 2021-03-01 PROCEDURE — 6370000000 HC RX 637 (ALT 250 FOR IP): Performed by: INTERNAL MEDICINE

## 2021-03-01 RX ORDER — POLYETHYLENE GLYCOL 3350 17 G/17G
17 POWDER, FOR SOLUTION ORAL DAILY PRN
Status: DISCONTINUED | OUTPATIENT
Start: 2021-03-01 | End: 2021-03-02 | Stop reason: HOSPADM

## 2021-03-01 RX ORDER — LORAZEPAM 2 MG/ML
3 INJECTION INTRAMUSCULAR
Status: DISCONTINUED | OUTPATIENT
Start: 2021-03-01 | End: 2021-03-02 | Stop reason: HOSPADM

## 2021-03-01 RX ORDER — LORAZEPAM 2 MG/ML
2 INJECTION INTRAMUSCULAR
Status: DISCONTINUED | OUTPATIENT
Start: 2021-03-01 | End: 2021-03-02 | Stop reason: HOSPADM

## 2021-03-01 RX ORDER — SODIUM CHLORIDE 0.9 % (FLUSH) 0.9 %
10 SYRINGE (ML) INJECTION EVERY 12 HOURS SCHEDULED
Status: DISCONTINUED | OUTPATIENT
Start: 2021-03-01 | End: 2021-03-01

## 2021-03-01 RX ORDER — AMMONIUM LACTATE 12 G/100G
LOTION TOPICAL PRN
Status: DISCONTINUED | OUTPATIENT
Start: 2021-03-01 | End: 2021-03-02 | Stop reason: HOSPADM

## 2021-03-01 RX ORDER — ONDANSETRON 2 MG/ML
4 INJECTION INTRAMUSCULAR; INTRAVENOUS EVERY 6 HOURS PRN
Status: DISCONTINUED | OUTPATIENT
Start: 2021-03-01 | End: 2021-03-02 | Stop reason: HOSPADM

## 2021-03-01 RX ORDER — LORAZEPAM 2 MG/ML
1 INJECTION INTRAMUSCULAR
Status: DISCONTINUED | OUTPATIENT
Start: 2021-03-01 | End: 2021-03-02 | Stop reason: HOSPADM

## 2021-03-01 RX ORDER — LANOLIN ALCOHOL/MO/W.PET/CERES
100 CREAM (GRAM) TOPICAL DAILY
Status: DISCONTINUED | OUTPATIENT
Start: 2021-03-01 | End: 2021-03-02 | Stop reason: HOSPADM

## 2021-03-01 RX ORDER — LORAZEPAM 1 MG/1
2 TABLET ORAL
Status: DISCONTINUED | OUTPATIENT
Start: 2021-03-01 | End: 2021-03-02 | Stop reason: HOSPADM

## 2021-03-01 RX ORDER — LORAZEPAM 1 MG/1
3 TABLET ORAL
Status: DISCONTINUED | OUTPATIENT
Start: 2021-03-01 | End: 2021-03-02 | Stop reason: HOSPADM

## 2021-03-01 RX ORDER — ACETAMINOPHEN 325 MG/1
650 TABLET ORAL EVERY 6 HOURS PRN
Status: DISCONTINUED | OUTPATIENT
Start: 2021-03-01 | End: 2021-03-02 | Stop reason: HOSPADM

## 2021-03-01 RX ORDER — VITAMIN B COMPLEX
2000 TABLET ORAL DAILY
Status: DISCONTINUED | OUTPATIENT
Start: 2021-03-01 | End: 2021-03-02 | Stop reason: HOSPADM

## 2021-03-01 RX ORDER — AMLODIPINE BESYLATE 5 MG/1
2.5 TABLET ORAL DAILY
Status: DISCONTINUED | OUTPATIENT
Start: 2021-03-01 | End: 2021-03-02 | Stop reason: HOSPADM

## 2021-03-01 RX ORDER — ACETAMINOPHEN 650 MG/1
650 SUPPOSITORY RECTAL EVERY 6 HOURS PRN
Status: DISCONTINUED | OUTPATIENT
Start: 2021-03-01 | End: 2021-03-02 | Stop reason: HOSPADM

## 2021-03-01 RX ORDER — SODIUM CHLORIDE 0.9 % (FLUSH) 0.9 %
10 SYRINGE (ML) INJECTION EVERY 12 HOURS SCHEDULED
Status: DISCONTINUED | OUTPATIENT
Start: 2021-03-01 | End: 2021-03-02 | Stop reason: HOSPADM

## 2021-03-01 RX ORDER — LORAZEPAM 1 MG/1
4 TABLET ORAL
Status: DISCONTINUED | OUTPATIENT
Start: 2021-03-01 | End: 2021-03-02 | Stop reason: HOSPADM

## 2021-03-01 RX ORDER — SODIUM CHLORIDE 0.9 % (FLUSH) 0.9 %
10 SYRINGE (ML) INJECTION PRN
Status: DISCONTINUED | OUTPATIENT
Start: 2021-03-01 | End: 2021-03-01

## 2021-03-01 RX ORDER — SODIUM CHLORIDE 0.9 % (FLUSH) 0.9 %
10 SYRINGE (ML) INJECTION PRN
Status: DISCONTINUED | OUTPATIENT
Start: 2021-03-01 | End: 2021-03-02 | Stop reason: HOSPADM

## 2021-03-01 RX ORDER — NICOTINE 21 MG/24HR
1 PATCH, TRANSDERMAL 24 HOURS TRANSDERMAL DAILY
Status: DISCONTINUED | OUTPATIENT
Start: 2021-03-01 | End: 2021-03-02 | Stop reason: HOSPADM

## 2021-03-01 RX ORDER — PROMETHAZINE HYDROCHLORIDE 12.5 MG/1
12.5 TABLET ORAL EVERY 6 HOURS PRN
Status: DISCONTINUED | OUTPATIENT
Start: 2021-03-01 | End: 2021-03-02 | Stop reason: HOSPADM

## 2021-03-01 RX ORDER — LORAZEPAM 2 MG/ML
4 INJECTION INTRAMUSCULAR
Status: DISCONTINUED | OUTPATIENT
Start: 2021-03-01 | End: 2021-03-02 | Stop reason: HOSPADM

## 2021-03-01 RX ORDER — LORAZEPAM 1 MG/1
1 TABLET ORAL
Status: DISCONTINUED | OUTPATIENT
Start: 2021-03-01 | End: 2021-03-02 | Stop reason: HOSPADM

## 2021-03-01 RX ORDER — POTASSIUM CHLORIDE 20 MEQ/1
40 TABLET, EXTENDED RELEASE ORAL ONCE
Status: COMPLETED | OUTPATIENT
Start: 2021-03-01 | End: 2021-03-01

## 2021-03-01 RX ORDER — ACETAMINOPHEN 80 MG
TABLET,CHEWABLE ORAL ONCE
Status: COMPLETED | OUTPATIENT
Start: 2021-03-01 | End: 2021-03-01

## 2021-03-01 RX ORDER — POTASSIUM CHLORIDE 7.45 MG/ML
10 INJECTION INTRAVENOUS ONCE
Status: COMPLETED | OUTPATIENT
Start: 2021-03-01 | End: 2021-03-01

## 2021-03-01 RX ADMIN — SODIUM CHLORIDE 1500 MG: 900 INJECTION INTRAVENOUS at 20:33

## 2021-03-01 RX ADMIN — SODIUM CHLORIDE 1500 MG: 900 INJECTION INTRAVENOUS at 15:31

## 2021-03-01 RX ADMIN — POTASSIUM CHLORIDE 10 MEQ: 7.46 INJECTION, SOLUTION INTRAVENOUS at 13:00

## 2021-03-01 RX ADMIN — Medication 2000 UNITS: at 10:27

## 2021-03-01 RX ADMIN — AMLODIPINE BESYLATE 2.5 MG: 5 TABLET ORAL at 10:27

## 2021-03-01 RX ADMIN — Medication: at 13:00

## 2021-03-01 RX ADMIN — SODIUM CHLORIDE 1500 MG: 900 INJECTION INTRAVENOUS at 10:27

## 2021-03-01 RX ADMIN — SODIUM CHLORIDE, PRESERVATIVE FREE 10 ML: 5 INJECTION INTRAVENOUS at 20:34

## 2021-03-01 RX ADMIN — POTASSIUM CHLORIDE 40 MEQ: 20 TABLET, EXTENDED RELEASE ORAL at 10:27

## 2021-03-01 RX ADMIN — Medication 100 MG: at 10:27

## 2021-03-01 RX ADMIN — ACETAMINOPHEN 650 MG: 325 TABLET ORAL at 19:26

## 2021-03-01 RX ADMIN — SODIUM CHLORIDE, PRESERVATIVE FREE 10 ML: 5 INJECTION INTRAVENOUS at 10:30

## 2021-03-01 ASSESSMENT — ENCOUNTER SYMPTOMS
PHOTOPHOBIA: 0
VOMITING: 1
NAUSEA: 1
WHEEZING: 0
EYES NEGATIVE: 1
ALLERGIC/IMMUNOLOGIC NEGATIVE: 1
RESPIRATORY NEGATIVE: 1
GASTROINTESTINAL NEGATIVE: 1
NAUSEA: 0
ABDOMINAL PAIN: 0
RHINORRHEA: 0
SORE THROAT: 0
VOMITING: 0
SHORTNESS OF BREATH: 0
BACK PAIN: 1
DIARRHEA: 0
COUGH: 0
CONSTIPATION: 0

## 2021-03-01 ASSESSMENT — PAIN SCALES - GENERAL
PAINLEVEL_OUTOF10: 0
PAINLEVEL_OUTOF10: 10
PAINLEVEL_OUTOF10: 0

## 2021-03-01 NOTE — PLAN OF CARE
Nutrition Problem #1: In context of social or environmental circumstances, Severe malnutrition  Intervention: Food and/or Nutrient Delivery: Modify Current Diet, Start Oral Nutrition Supplement(Discontinue Carb Control restriction.   Start High Calorie ONS TID and frozen ONS TID)  Nutritional Goals: po intake > 75% of meals and supplements, weight gain

## 2021-03-01 NOTE — ED TRIAGE NOTES
Pt state that she has been having weakness since her admission at this hospital. Pt states that she is also having bi-lat foot pain and swelling. Pt states that she has not been feeling well since her d/c. Pt states that she is also having hand pain. Per son pt has not been drinking for the last 4 days and has not been drinking as heavy as she did prior to admission.

## 2021-03-01 NOTE — ED PROVIDER NOTES
[address] ED  EMERGENCY DEPARTMENT ENCOUNTER      Pt Name: Remy Pizarro  MRN: [id number]  [place] 1960  Date of evaluation: 2/28/2021  Provider: Mey Sellers PA-C    CHIEF COMPLAINT       Chief Complaint   Patient presents with    Fatigue    Foot Pain     bi-lat swelling          HISTORY OF PRESENT ILLNESS   (Location/Symptom, Timing/Onset, Context/Setting, Quality, Duration, Modifying Factors, Severity)  Note limiting factors. Remy Pizarro is a 61 y.o. female who presents to the emergency department with multiple physical complaints that have been ongoing recently. Patient was seen and admitted in the hospital in the beginning of February for hyponatremia and hepatic encephalopathy. Patient had been discharged home with prescriptions for medicines which the patient does state that she had taken for the first few days which seemed to help with her strength and being able to stay awake, however the patient states that symptoms had recurred in the past 2 weeks. Patient saw her primary doctor on February 17 and was supposed to have labs and CTs done on the 18th however has not made it to get these testing completed. Son states that the patient has had generalized weakness and multiple falls over the past several days, including a fall yesterday in which she struck her head on the edge of the bathtub. Patient states that she has been too weak to get up on her own and has relied on family to help her ambulate due to the weakness. Patient also states that she has had difficulty staying awake, sleeping for a majority of the day every day. Patient has a history of alcohol abuse but states that she has not been drinking as much in the past 3 weeks. Patient also states that for the past 4 days her left foot has been significantly swollen. Patient is unsure if she injured it. Patient states her bilateral feet are swollen but the left seems more significant than the right.   Patient also states that she has been having some left-sided chest pain though she is unsure if this is from an injury or not. Patient does state that she has had a poor appetite and has not been eating or drinking as much over the past week        HPI    Nursing Notes were reviewed. REVIEW OF SYSTEMS    (2-9 systems for level 4, 10 or more for level 5)     Review of Systems   Constitutional: Positive for fatigue. Negative for diaphoresis and fever. HENT: Negative for hearing loss and trouble swallowing. Eyes: Negative for pain. Respiratory: Negative for apnea and shortness of breath. Cardiovascular: Positive for chest pain and leg swelling. Gastrointestinal: Negative for abdominal pain. Endocrine: Negative. Genitourinary: Negative for hematuria. Musculoskeletal: Positive for joint swelling and myalgias. Negative for neck pain and neck stiffness. Skin: Negative for color change. Allergic/Immunologic: Negative. Neurological: Positive for weakness. Negative for dizziness and numbness. Hematological: Negative. Psychiatric/Behavioral: Negative. All other systems reviewed and are negative. Except as noted above the remainder of the review of systems was reviewed and negative. PAST MEDICAL HISTORY     Past Medical History:   Diagnosis Date    [facility]     [facility] ([facility]     ETOH abuse          SURGICAL HISTORY     History reviewed. No pertinent surgical history. CURRENT MEDICATIONS       Previous Medications    AMLODIPINE (NORVASC) 2.5 MG TABLET    Take 2.5 mg by mouth daily    AMMONIUM LACTATE (LAC-HYDRIN) 12 % LOTION    Apply topically as needed for Dry Skin Apply topically as needed. POTASSIUM CHLORIDE (KLOR-CON M) 20 MEQ EXTENDED RELEASE TABLET    Take 1 tablet by mouth daily    VITAMIN D (CHOLECALCIFEROL) 50 MCG (2000 UT) TABS TABLET    Take 1 tablet by mouth daily       ALLERGIES     Zantac [ranitidine hcl]    FAMILY HISTORY     History reviewed.  No pertinent family history. SOCIAL HISTORY       Social History     Socioeconomic History    Marital status: Single     Spouse name: None    Number of children: None    Years of education: None    Highest education level: None   Occupational History    None   Social Needs    Financial resource strain: None    Food insecurity     Worry: None     Inability: None    Transportation needs     Medical: None     Non-medical: None   Tobacco Use    Smoking status: Current Every Day Smoker    Smokeless tobacco: Never Used   Substance and Sexual Activity    Alcohol use: Yes     Comment: occassional    Drug use: No    Sexual activity: None   Lifestyle    Physical activity     Days per week: None     Minutes per session: None    Stress: None   Relationships    Social connections     Talks on phone: None     Gets together: None     Attends Congregation service: None     Active member of club or organization: None     Attends meetings of clubs or organizations: None     Relationship status: None    Intimate partner violence     Fear of current or ex partner: None     Emotionally abused: None     Physically abused: None     Forced sexual activity: None   Other Topics Concern    None   Social History Narrative    None       SCREENINGS                        PHYSICAL EXAM    (up to 7 for level 4, 8 or more for level 5)     ED Triage Vitals [02/28/21 1948]   BP Temp Temp Source Pulse Resp SpO2 Height Weight   (!) 159/93 97.8 °F (36.6 °C) Oral 83 20 100 % 5' (1.524 m) 85 lb (38.6 kg)       Physical Exam  Vitals signs and nursing note reviewed. Constitutional:       General: She is not in acute distress. Appearance: She is well-developed. She is not diaphoretic. HENT:      Head: Normocephalic and atraumatic. Mouth/Throat:      Pharynx: No oropharyngeal exudate. Eyes:      General: No scleral icterus. Conjunctiva/sclera: Conjunctivae normal.      Pupils: Pupils are equal, round, and reactive to light.    Neck: Musculoskeletal: Normal range of motion and neck supple. Trachea: No tracheal deviation. Cardiovascular:      Rate and Rhythm: Normal rate. Heart sounds: Normal heart sounds. Pulmonary:      Effort: Pulmonary effort is normal. No respiratory distress. Breath sounds: Normal breath sounds. Abdominal:      General: Bowel sounds are normal. There is no distension. Palpations: Abdomen is soft. Musculoskeletal: Normal range of motion. Skin:     General: Skin is warm and dry. Findings: Erythema present. No rash. Neurological:      Mental Status: She is alert and oriented to person, place, and time. Cranial Nerves: No cranial nerve deficit. Motor: No abnormal muscle tone. Psychiatric:         Behavior: Behavior normal.         Thought Content:  Thought content normal.         Judgment: Judgment normal.         DIAGNOSTIC RESULTS     EKG: All EKG's are interpreted by the Emergency Department Physician who either signs or Co-signs this chart in the absence of a cardiologist.        RADIOLOGY:   Non-plain film images such as CT, Ultrasound and MRI are read by the radiologist. Plain radiographic images are visualized and preliminarily interpreted by the emergency physician with the below findings:    Neg    Interpretation per the Radiologist below, if available at the time of this note:    CTA CHEST [address]    (Results Pending)   CT ABDOMEN PELVIS W IV CONTRAST    (Results Pending)   US DUP LOWER EXTREMITY LEFT FAY    (Results Pending)   CT HEAD WO CONTRAST    (Results Pending)   XR FOOT LEFT (MIN 3 VIEWS)    (Results Pending)         ED BEDSIDE ULTRASOUND:   Performed by ED Physician - none    LABS:  Labs Reviewed   CBC WITH AUTO DIFFERENTIAL - Abnormal; Notable for the following components:       Result Value    RBC 3.77 (*)     Hematocrit 36.5 (*)     MCH 33.3 (*)     Platelets 078 (*)     Neutrophils Absolute 6.8 (*)     Monocytes Absolute 1.4 (*)     All other components within normal limits   COMPREHENSIVE METABOLIC PANEL - Abnormal; Notable for the following components:    Sodium 123 (*)     Potassium 3.2 (*)     Chloride 85 (*)     CREATININE 0.33 (*)     AST 46 (*)     Globulin 3.7 (*)     All other components within normal limits   MAGNESIUM - Abnormal; Notable for the following components:    Magnesium 1.6 (*)     All other components within normal limits   CK - Abnormal; Notable for the following components: Total  (*)     All other components within normal limits   CKMB & RELATIVE PERCENT - Abnormal; Notable for the following components:    CK-MB 35.6 (*)     CK-MB Index 6.5 (*)     All other components within normal limits   CULTURE, BLOOD 1   CULTURE, BLOOD 2   COVID-19, RAPID   PROTIME-INR   APTT   AMMONIA      CEA   GAMMA GT   HEPATITIS PANEL, ACUTE   ETHANOL   LACTIC ACID, PLASMA   LIPASE   TROPONIN   BRAIN NATRIURETIC PEPTIDE   PROCALCITONIN   CANCER ANTIGEN 19-9   URINE RT REFLEX TO CULTURE       All other labs were within normal range or not returned as of this dictation. EMERGENCY DEPARTMENT COURSE and DIFFERENTIAL DIAGNOSIS/MDM:   Vitals:    Vitals:    02/28/21 1948   BP: (!) 159/93   Pulse: 83   Resp: 20   Temp: 97.8 °F (36.6 °C)   TempSrc: Oral   SpO2: 100%   Weight: 85 lb (38.6 kg)   Height: 5' (1.524 m)           MDM      REASSESSMENT        Presents emergency department with complaints of fatigue, generalized weakness and multiple falls. Patient does have a recurrent hyponatremia, hypokalemia and hypomagnesemia. CT of the head, chest and abdomen reveal no traumatic injury. Given the multiple falls and generalized weakness patient will be admitted in the hospital for further evaluation and care    CONSULTS:  None    PROCEDURES:  Unless otherwise noted below, none     Procedures        FINAL IMPRESSION      1. Hyponatremia    2. Hypomagnesemia    3. Hypokalemia    4.  Fall, initial encounter          DISPOSITION/PLAN   DISPOSITION Admitted 02/28/2021 11:26:14 PM      PATIENT REFERRED TO:  No follow-up provider specified. DISCHARGE MEDICATIONS:  New Prescriptions    No medications on file     Controlled Substances Monitoring:     No flowsheet data found.     (Please note that portions of this note were completed with a voice recognition program.  Efforts were made to edit the dictations but occasionally words are mis-transcribed.)    Morales Borges PA-C (electronically signed)  Attending Emergency Physician            Morales Borges PA-C  02/28/21 2209

## 2021-03-01 NOTE — CONSULTS
Chief Complaint   Patient presents with    Fatigue    Foot Pain     bi-lat swelling         Patient is a 61 y.o. female who presents with a chief complaint of generalized weakness and hyponatremia. . Patient is followed on a regular basis by Dr. Destiny Dubois MD.  Patient is a poor historian. Admitted for alcohol intoxication, generalized weakness and hyponatremia. She denies any previous cardiac issues such as myocardial infarction, congestive heart failure or arrhythmia. No previous stress test or cardiac catheterization. She complains of chest discomfort but difficult to explain. She admits to smoking. Denies history of diabetes or hyperlipidemia. Does have history of hypertension. She has admits to smoking as well. Initial cardiac enzymes negative. EKG shows normal sinus rhythm, nonspecific ST changes questionable LVH. Past Medical History:   Diagnosis Date    Cancer [facility])     Cervical cancer ([facility] [address]     ETOH abuse       Patient Active Problem List   Diagnosis    Hyponatremia    HTN (hypertension)    Hypokalemia    Alcohol use    Elevated liver enzymes    Encephalopathy acute    Metabolic encephalopathy    Repeated falls    Hypomagnesemia       History reviewed. No pertinent surgical history.     Social History     Socioeconomic History    Marital status: Single     Spouse name: None    Number of children: None    Years of education: None    Highest education level: None   Occupational History    None   Social Needs    Financial resource strain: None    Food insecurity     Worry: None     Inability: None    Transportation needs     Medical: None     Non-medical: None   Tobacco Use    Smoking status: Current Every Day Smoker    Smokeless tobacco: Never Used   Substance and Sexual Activity    Alcohol use: Yes     Comment: occassional    Drug use: No    Sexual activity: None   Lifestyle    Physical activity     Days per week: None     Minutes per session: None    Stress: kg/m². Physical Exam   Constitutional: She is oriented to person, place, and time. She appears well-developed and well-nourished. HENT:   Head: Normocephalic and atraumatic. Eyes: Pupils are equal, round, and reactive to light. Neck: Normal range of motion. Neck supple. No JVD present. No tracheal deviation present. No thyromegaly present. Cardiovascular: Normal rate, regular rhythm, normal heart sounds and intact distal pulses. PMI is not displaced. Exam reveals no gallop, no S3, no distant heart sounds and no friction rub. No murmur heard. Pulmonary/Chest: No respiratory distress. She has no wheezes. She has no rales. She exhibits no tenderness. Abdominal: Soft. Bowel sounds are normal. She exhibits no distension and no mass. There is no abdominal tenderness. There is no rebound and no guarding. Musculoskeletal:         General: No edema. Neurological: She is alert and oriented to person, place, and time. No cranial nerve deficit. Skin: Skin is warm and dry. No rash noted. She is not diaphoretic. No erythema. No pallor. Psychiatric: She has a normal mood and affect.  Her behavior is normal. Judgment and thought content normal.       LABS:  Recent Results (from the past 24 hour(s))   Protime-INR    Collection Time: 02/28/21  8:00 PM   Result Value Ref Range    Protime 12.7 12.3 - 14.9 sec    INR 0.9    APTT    Collection Time: 02/28/21  8:00 PM   Result Value Ref Range    aPTT 32.8 24.4 - 36.8 sec   Ammonia    Collection Time: 02/28/21  8:00 PM   Result Value Ref Range    Ammonia 18 11 - 51 umol/L       Collection Time: 02/28/21  8:00 PM   Result Value Ref Range     6.9 0.0 - 35.0 U/mL   CEA    Collection Time: 02/28/21  8:00 PM   Result Value Ref Range    CEA 4.7 0.0 - 5.5 ng/mL   CBC Auto Differential    Collection Time: 02/28/21  8:00 PM   Result Value Ref Range    WBC 9.4 4.8 - 10.8 K/uL    RBC 3.77 (L) 4.20 - 5.40 M/uL    Hemoglobin 12.6 12.0 - 16.0 g/dL    Hematocrit 36.5 (L) 37.0 - 47.0 %    MCV 96.9 82.0 - 100.0 fL    MCH 33.3 (H) 27.0 - 31.3 pg    MCHC 34.4 33.0 - 37.0 %    RDW 13.1 11.5 - 14.5 %    Platelets 871 (H) 676 - 400 K/uL    Neutrophils % 72.1 %    Lymphocytes % 12.5 %    Monocytes % 15.0 %    Eosinophils % 0.0 %    Basophils % 0.4 %    Neutrophils Absolute 6.8 (H) 1.4 - 6.5 K/uL    Lymphocytes Absolute 1.2 1.0 - 4.8 K/uL    Monocytes Absolute 1.4 (H) 0.2 - 0.8 K/uL    Eosinophils Absolute 0.0 0.0 - 0.7 K/uL    Basophils Absolute 0.0 0.0 - 0.2 K/uL   Hepatitis Panel, Acute    Collection Time: 02/28/21  8:00 PM   Result Value Ref Range    Hep A IgM Non-reactive     Hep B Core Ab, IgM Non-reactive     Hep B S Ag Interp Non-reactive     Hep C Ab Interp Non-reactive     Hepatitis Interpretation: see below    Ethanol    Collection Time: 02/28/21  8:00 PM   Result Value Ref Range    Ethanol Lvl <10 mg/dL    Ethanol percent Not indicated G/dL   Lactic Acid, Plasma    Collection Time: 02/28/21  8:00 PM   Result Value Ref Range    Lactic Acid 1.0 0.5 - 2.2 mmol/L   Urine Reflex to Culture    Collection Time: 02/28/21  8:00 PM    Specimen: Urine, clean catch   Result Value Ref Range    Color, UA Yellow Straw/Yellow    Clarity, UA Clear Clear    Glucose, Ur Negative Negative mg/dL    Bilirubin Urine Negative Negative    Ketones, Urine TRACE (A) Negative mg/dL    Specific Gravity, UA 1.028 1.005 - 1.030    Blood, Urine Negative Negative    pH, UA 7.0 5.0 - 9.0    Protein, UA Negative Negative mg/dL    Urobilinogen, Urine 0.2 <2.0 E.U./dL    Nitrite, Urine Negative Negative    Leukocyte Esterase, Urine Negative Negative    Urine Reflex to Culture Not Indicated    Comprehensive Metabolic Panel    Collection Time: 02/28/21  8:15 PM   Result Value Ref Range    Sodium 123 (L) 135 - 144 mEq/L    Potassium 3.2 (L) 3.4 - 4.9 mEq/L    Chloride 85 (L) 95 - 107 mEq/L    CO2 26 20 - 31 mEq/L    Anion Gap 12 9 - 15 mEq/L    Glucose 86 70 - 99 mg/dL    BUN 8 8 - 23 mg/dL    CREATININE 0.33 (L) 0.50 - 0.90 mg/dL    GFR Non-African American >60.0 >60    GFR  >60.0 >60    Calcium 9.5 8.5 - 9.9 mg/dL    Total Protein 7.6 6.3 - 8.0 g/dL    Albumin 3.9 3.5 - 4.6 g/dL    Total Bilirubin 0.7 0.2 - 0.7 mg/dL    Alkaline Phosphatase 107 40 - 130 U/L    ALT 30 0 - 33 U/L    AST 46 (H) 0 - 35 U/L    Globulin 3.7 (H) 2.3 - 3.5 g/dL   GAMMA GT    Collection Time: 02/28/21  8:15 PM   Result Value Ref Range    GGT 17 0 - 40 U/L   Magnesium    Collection Time: 02/28/21  8:15 PM   Result Value Ref Range    Magnesium 1.6 (L) 1.7 - 2.4 mg/dL   Lipase    Collection Time: 02/28/21  8:15 PM   Result Value Ref Range    Lipase 44 12 - 95 U/L   Troponin    Collection Time: 02/28/21  8:15 PM   Result Value Ref Range    Troponin <0.010 0.000 - 0.010 ng/mL   CK    Collection Time: 02/28/21  8:15 PM   Result Value Ref Range    Total  (H) 0 - 170 U/L   Brain Natriuretic Peptide    Collection Time: 02/28/21  8:15 PM   Result Value Ref Range    Pro- pg/mL   PROCALCITONIN    Collection Time: 02/28/21  8:15 PM   Result Value Ref Range    Procalcitonin 0.07 0.00 - 0.15 ng/mL   CKMB & RELATIVE PERCENT    Collection Time: 02/28/21  8:15 PM   Result Value Ref Range    CK-MB 35.6 (H) 0.0 - 3.8 ng/mL    CK-MB Index 6.5 (H) 0.0 - 3.5 %   COVID-19, Rapid    Collection Time: 02/28/21 11:52 PM    Specimen: Nasopharyngeal Swab   Result Value Ref Range    SARS-CoV-2, NAAT Not Detected Not Detected   Basic Metabolic Panel    Collection Time: 03/01/21  5:45 AM   Result Value Ref Range    Sodium 130 (L) 135 - 144 mEq/L    Potassium 3.0 (LL) 3.4 - 4.9 mEq/L    Chloride 96 95 - 107 mEq/L    CO2 24 20 - 31 mEq/L    Anion Gap 10 9 - 15 mEq/L    Glucose 86 70 - 99 mg/dL    BUN 6 (L) 8 - 23 mg/dL    CREATININE 0.33 (L) 0.50 - 0.90 mg/dL    GFR Non-African American >60.0 >60    GFR  >60.0 >60    Calcium 8.4 (L) 8.5 - 9.9 mg/dL   Magnesium    Collection Time: 03/01/21  5:45 AM   Result Value Ref Range    Magnesium 2.3 1.7 - 2.4 mg/dL   Troponin    Collection Time: 03/01/21  9:59 AM   Result Value Ref Range    Troponin <0.010 0.000 - 0.010 ng/mL   Basic Metabolic Panel    Collection Time: 03/01/21  9:59 AM   Result Value Ref Range    Sodium 129 (L) 135 - 144 mEq/L    Potassium 3.1 (L) 3.4 - 4.9 mEq/L    Chloride 94 (L) 95 - 107 mEq/L    CO2 26 20 - 31 mEq/L    Anion Gap 9 9 - 15 mEq/L    Glucose 98 70 - 99 mg/dL    BUN 7 (L) 8 - 23 mg/dL    CREATININE 0.41 (L) 0.50 - 0.90 mg/dL    GFR Non-African American >60.0 >60    GFR  >60.0 >60    Calcium 8.4 (L) 8.5 - 9.9 mg/dL     Troponin:   Lab Results   Component Value Date    TROPONINI <0.010 03/01/2021       EKG: normal sinus rhythm, nonspecific ST and T waves changes      ASSESSMENT:    Active Hospital Problems    Diagnosis Date Noted    Repeated falls [R29.6] 02/28/2021     Priority: Low    Hypomagnesemia [E83.42] 02/28/2021     Priority: Low    Hypokalemia [E87.6] 02/07/2021     Priority: Low    Alcohol use [Z72.89] 02/07/2021     Priority: Low    Hyponatremia [E87.1] 02/06/2021     Priority: Low     Atypical chest pain  Alcohol and tobacco abuse  Hyponatremia  Essential hypertension  Hypokalemia    PLAN:   1. As always, aggressive risk factor modification is strongly recommended. We should adhere to the JNC VIII guidelines for HTN management and the NCEPATP III guidelines for LDL-C management. 2. Check 2D echocardiogram  3. Maximize cardiac medications  4. Monitor on telemetry  5. Conservative medical therapy from cardiology standpoint. Not ideal PCI candidate secondary to alcohol abuse and noncompliance. 6. GI/DVT prophylaxis  7. Keep potassium greater than 4, magnesium greater than 2.  8. Smoking cessation strongly recommended      Thank you for allowing me to participate in the care of your patient, please don't hesitate to contact me if you have any further questions.     Electronically signed by Aakash Goyal DO on 3/1/2021 at 2:00 PM

## 2021-03-01 NOTE — PROGRESS NOTES
Comprehensive Nutrition Assessment    Type and Reason for Visit:  Initial, Consult(Poor po intake)    Nutrition Recommendations/Plan:   Discontinue Carb Control restriction. Start High Calorie ONS TID and frozen ONS TID (vanilla)    Nutrition Assessment:  Pt severely malnourished on admission evidenced by weight loss and severe fat and muscle loss. Will start nutrition supplements and continue to monitor    Malnutrition Assessment:  Malnutrition Status:  Severe malnutrition    Context:  Social/Environmental Circumstances     Findings of the 6 clinical characteristics of malnutrition:  Energy Intake:  Unable to assess  Weight Loss:  7 - Greater than 5% over 1 month     Body Fat Loss:  7 - Severe body fat loss Buccal region, Orbital   Muscle Mass Loss:  7 - Severe muscle mass loss Temples (temporalis), Clavicles (pectoralis & deltoids)  Fluid Accumulation:  Unable to assess     Strength:  Not Performed    Estimated Daily Nutrient Needs:  Energy (kcal):  6567-2563 (kg x 35-40); Weight Used for Energy Requirements:  Current(35.7 kg)     Protein (g):  53-64 gm (kg x 1.5-1.8); Weight Used for Protein Requirements:  Current(35.7 kg)        Fluid (ml/day):  ~1400 ml; Method Used for Fluid Requirements:  1 ml/kcal      Nutrition Related Findings:  PMH: cervical cancer, ETOH abuse (daily beer drinker), BM (2/28), Peripheral line, no IVF at present, meds include thiamin, labs reviewed. Pt stated she 'was eating good' pta, staying with her sister. Was taking 1 Ensure daily.  Only took ~ 25% of lunch, states she is a picky eater      Wounds:  (unclear, ? burn, pressure injury to foot. 'stepped on broken glass per MD note', 'No open wound per podiatry')       Current Nutrition Therapies:    DIET CARB CONTROL; (1-25%)  Anthropometric Measures:  · Height: 5' (152.4 cm)  · Current Body Weight: 78 lb (35.4 kg)(3/1)   · Admission Body Weight: 85 lb (38.6 kg)(stated)    · Usual Body Weight: 83 lb (37.6 kg)(2/6/21 [facility], (90-95 lb stated on previous admission))     · Ideal Body Weight: 100 lbs; % Ideal Body Weight  78%  · BMI: 15.2  · BMI Categories: Underweight (BMI less than 18.5)       Nutrition Diagnosis:   · In context of social or environmental circumstances, Severe malnutrition related to inadequate protein-energy intake, other (comment)(ETOH abuse) as evidenced by BMI, weight loss, severe loss of subcutaneous fat, severe muscle loss    Nutrition Interventions:   Food and/or Nutrient Delivery:  Modify Current Diet, Start Oral Nutrition Supplement(Discontinue Carb Control restriction.   Start High Calorie ONS TID and frozen ONS TID)  Nutrition Education/Counseling:  No recommendation at this time   Coordination of Nutrition Care:  Continue to monitor while inpatient    Goals:  po intake > 75% of meals and supplements, weight gain       Nutrition Monitoring and Evaluation:   Food/Nutrient Intake Outcomes:  Food and Nutrient Intake, Supplement Intake  Physical Signs/Symptoms Outcomes:  Biochemical Data, Weight     Electronically signed by Cynthia William RD, LD on 3/1/21 at 3:44 PM EST

## 2021-03-01 NOTE — ED NOTES
Pt incont of urine. Per Bony Francois straight cath pt for urine.       Bruna Merchant, CATHERINE  02/28/21 8304

## 2021-03-01 NOTE — ED NOTES
Report called to floor, tele box on, belonging list done, ready for transport to floor      CATHERINE Brasher  03/01/21 1985

## 2021-03-01 NOTE — CONSULTS
[address]                      [address], [address] [facility]                                  CONSULTATION    PATIENT NAME: Sonia Epperson                      :        1960  MED REC NO:   [id number]                            ROOM:       A752  ACCOUNT NO:   [de-identified]                           ADMIT DATE: 2021  PROVIDER:     Alice Cox DO    CONSULT DATE:  2021    RENAL CONSULT    HISTORY OF PRESENT ILLNESS:  A 63-year-old , frail female  admitted to the hospital with hyponatremia. On admission, the patient  had sodium of 123 mEq that improved to 130 mEq with saline overnight. The patient was recently discharged for hyponatremia related to  excessive alcohol abuse. The patient does use beer on a consistent  basis. She stayed with her daughter for a few days after the last  discharge and returned home to continue her habits of alcohol usage. Her potassium was 3.2 mEq on admission with a normal GFR and a slightly  elevated CPK of 545. The patient appears to be in no distress, alert  and oriented, being seen with Dr. Stephan Martin at the time. PAST MEDICAL HISTORY:  Hyponatremia, hypokalemia, hypomagnesemia related  to alcohol, history of hypertension. No elevation of liver enzymes at  this time. PAST SURGICAL HISTORY:  None related. FAMILY HISTORY:  Noncontributory. HABITS:  Daily alcohol abuser. MEDICATIONS:  Doubt if she was taking any with records showing Norvasc,  Klor-Con, vitamin D. ALLERGIES TO MEDICATIONS:  ZANTAC. REVIEW OF SYSTEMS:  Noncontributory. PHYSICAL EXAMINATION:  VITAL SIGNS:  5 feet, 78 pounds. Blood pressure 119/60, heart rate 77,  respirations 18, afebrile. HEENT:  Normocephalic. Pupils equal and react to light. Extraocular  muscles are intact. NECK:  Supple. No JVD or adenopathy. CHEST:  The lungs are clear. Decreased sounds. CARDIOVASCULAR:  Heart is regular 1/6 systolic murmur. ABDOMEN:  Shows slight tenderness in the epigastric area on deep  palpation. Bowel sounds are present. No pulsatile masses. No  distention. EXTREMITIES:  Showed that the patient has significant left foot erythema  with cuts of her _____ of right foot, which she states she accidentally  stepped on a glass. IMPRESSION:  Hyponatremia related to alcohol abuse, potomania,  hypokalemia, cellulitis of the left foot, abrasions of the feet due to  stepping on a broken glass. PLAN:  Restrict orals 100 mL for 24 hours. Replace potassium. Check  magnesium. Again warned of persistent alcohol abuse with related  cirrhosis to be forthcoming. Podiatry to see for a left foot and  antibiotics to be given for cellulitis.         Jean Paul Ramirez,     D: 03/01/2021 11:14:11       T: 03/01/2021 11:18:48     [email or web address]  Job#: [id number]     Doc#: [id number]    CC:

## 2021-03-01 NOTE — DISCHARGE INSTR - COC
Continuity of Care Form    Patient Name: Aquilino Spencer   :  1960  MRN:  [id number]    Admit date:  2021  Discharge date:  ***    Code Status Order: Full Code   Advance Directives:      Admitting Physician: Sandy Radford MD  PCP: Dylan Longoria MD    Discharging Nurse: [facility] Unit/Room#: E721/J164-92  Discharging Unit Phone Number: ***    Emergency Contact:   Extended Emergency Contact Information  Primary Emergency Contact: Minna Pacheco  Home Phone: [phone]  Relation: Child  Preferred language: English   needed? No  Secondary Emergency Contact: Lala Sahni  Mobile Phone: [phone]  Relation: Child  Preferred language: English   needed? No    Past Surgical History:  History reviewed. No pertinent surgical history. Immunization History: There is no immunization history for the selected administration types on file for this patient.     Active Problems:  Patient Active Problem List   Diagnosis Code    Hyponatremia E87.1    HTN (hypertension) I10    Hypokalemia E87.6    Alcohol use Z72.89    Elevated liver enzymes R74.8    Encephalopathy acute N41.70    Metabolic encephalopathy H27.48    Repeated falls R29.6    Hypomagnesemia E83.42    Severe malnutrition ([facility] [address] E43       Isolation/Infection:   Isolation            No Isolation          Patient Infection Status       Infection Onset Added Last Indicated Last Indicated By Review Planned Expiration Resolved Resolved By    None active    Resolved    COVID-19 Rule Out 21 COVID-19 (Ordered)   21 Rule-Out Test Resulted            Nurse Assessment:  Last Vital Signs: /81   Pulse 81   Temp 98.6 °F (37 °C) (Oral)   Resp 18   Ht 5' (1.524 m)   Wt 78 lb 12.8 oz (35.7 kg)   LMP  (LMP Unknown)   SpO2 99%   BMI 15.39 kg/m²     Last documented pain score (0-10 scale): Pain Level: 0  Last Weight:   Wt Readings from Last 1 Encounters:   21 78 lb 12.8 oz (35.7 kg) Mental Status:  {IP PT MENTAL STATUS:}    IV Access:  { LENORA IV ACCESS:763867589}    Nursing Mobility/ADLs:  Walking   {[facility] DME OTVM:257375532}  Transfer  {[facility] DME FAXC:338914900}  Bathing  {[facility] DME GUUC:702582413}  Dressing  {[facility] DME RLBL:443347427}  Toileting  {[facility] DME EBXT:794653542}  Feeding  {[facility] DME GZXW:932687286}  Med Admin  {[facility] DME AUGE:957460258}  Med Delivery   { LENORA MED Delivery:168595575}    Wound Care Documentation and Therapy:  Wound 21 Arm Distal;Left;Lower;Dorsal (Active)   Dressing Status Other (Comment) 21 0805   Dressing/Treatment Open to air 21 0805   Drainage Amount None 21 0805   Odor None 21 0805   Number of days: 0       Wound 21 Tibial Distal;Left;Dorsal (Active)   Dressing Status Other (Comment) 21 0805   Dressing/Treatment Open to air 21 0805   Drainage Amount None 21 0805   Odor None 21 0805   Number of days: 0        Elimination:  Continence: Bowel: {YES / HA:86565}  Bladder: {YES / SHELBY:26674}  Urinary Catheter: {Urinary Catheter:702070866}   Colostomy/Ileostomy/Ileal Conduit: {YES / D}       Date of Last BM: ***    Intake/Output Summary (Last 24 hours) at 3/1/2021 1549  Last data filed at 3/1/2021 1030  Gross per 24 hour   Intake 1245 ml   Output --   Net 1245 ml     I/O last 3 completed shifts: In: 6776 [P.O.:240;  I.V.:5; IV Piggyback:1000]  Out: -     Safety Concerns:     [address] Safety Concerns:929988525}    Impairments/Disabilities:      [address] Impairments/Disabilities:511570847}    Nutrition Therapy:  Current Nutrition Therapy:   [address] Diet List:679104644}    Routes of Feeding: {[facility] DME Other Feedings:095287325}  Liquids: {Slp liquid thickness:06833}  Daily Fluid Restriction: {CHP DME Yes amt example:188899402}  Last Modified Barium Swallow with Video (Video Swallowing Test): {Done Not Done UPKF:006025747}    Treatments at the Time of Hospital Discharge:   Respiratory Treatments: ***  Oxygen Therapy:  {Therapy; copd oxygen:95709}  Ventilator:    {MH CC Vent ZCBY:908926537}    Rehab Therapies: {THERAPEUTIC INTERVENTION:3021275880}  Weight Bearing Status/Restrictions: Zachery NEWMAN Weight Bearin}  Other Medical Equipment (for information only, NOT a DME order):  {EQUIPMENT:864296339}  Other Treatments: ***    Patient's personal belongings (please select all that are sent with patient):  {CHP DME Belongings:263986013}    RN SIGNATURE:  {Esignature:154187178}    CASE MANAGEMENT/SOCIAL WORK SECTION    Inpatient Status Date: ***    Readmission Risk Assessment Score:  Readmission Risk              Risk of Unplanned Readmission:        14           Discharging to Facility/ Agency   Name: [facility]  Address:  [facility]:[phone]  Fax:    Dialysis Facility (if applicable)   Name:  Address:  Dialysis Schedule:  Phone:  Fax:    / signature:Electronically signed by ANA PAULA Jefferson on 3/1/21 at 3:49 PM EST    PHYSICAL THERAPY RECOMMENDS USING A CANE. PHYSICIAN SECTION    Prognosis: {Prognosis:7152248390}    Condition at Discharge: 50Hanane Hebert Patient Condition:814424518}    Rehab Potential (if transferring to Rehab): {Prognosis:9748728013}    Recommended Labs or Other Treatments After Discharge: ***    Physician Certification: I certify the above information and transfer of Margarito Rizvi  is necessary for the continuing treatment of the diagnosis listed and that she requires {Admit to Appropriate Level of Care:12227} for {GREATER/LESS:855982619} 30 days.      Update Admission H&P: {CHP DME Changes in CYWMR:326871998}    PHYSICIAN SIGNATURE:  Electronically signed by Gricel Pradhan MD on 3/2/21 at 12:35 PM EST

## 2021-03-01 NOTE — CONSULTS
Consult renal    Hyponatremia  Hypokalemia  Potomania by history  Cellulitis left leg    Plan saline   Restrict oral 1400 24 hours  Replace K+ check Mg++ Antibiotic IV  Podiatry possible consult  AVOID ETOH

## 2021-03-01 NOTE — CARE COORDINATION
Per the RN, the patient is a 1:1 and is not alert and oriented. Per the RN, the patient's daughter called and had the [place] code. The patient's daughter states the patient's son would be the person to call regarding the discharge plan. The LSW left a voice message for the patient's son, Nicolas Bain, regarding the discharge plan and to complete the cmi.  Electronically signed by Mazin Boles on 3/1/21 at 3:39 PM EST

## 2021-03-01 NOTE — CONSULTS
Diagnosis Date    Cancer [facility])     Cervical cancer ([facility] [address]     ETOH abuse        History reviewed. No pertinent surgical history. No current facility-administered medications on file prior to encounter. Current Outpatient Medications on File Prior to Encounter   Medication Sig Dispense Refill    Vitamin D (CHOLECALCIFEROL) 50 MCG (2000 UT) TABS tablet Take 1 tablet by mouth daily 120 tablet 0    potassium chloride (KLOR-CON M) 20 MEQ extended release tablet Take 1 tablet by mouth daily 30 tablet 0    amLODIPine (NORVASC) 2.5 MG tablet Take 2.5 mg by mouth daily      ammonium lactate (LAC-HYDRIN) 12 % lotion Apply topically as needed for Dry Skin Apply topically as needed. Allergies   Allergen Reactions    Zantac [Ranitidine Hcl]        History reviewed. No pertinent family history.     Social History     Socioeconomic History    Marital status: Single     Spouse name: Not on file    Number of children: Not on file    Years of education: Not on file    Highest education level: Not on file   Occupational History    Not on file   Social Needs    Financial resource strain: Not on file    Food insecurity     Worry: Not on file     Inability: Not on file    Transportation needs     Medical: Not on file     Non-medical: Not on file   Tobacco Use    Smoking status: Current Every Day Smoker    Smokeless tobacco: Never Used   Substance and Sexual Activity    Alcohol use: Yes     Comment: occassional    Drug use: No    Sexual activity: Not on file   Lifestyle    Physical activity     Days per week: Not on file     Minutes per session: Not on file    Stress: Not on file   Relationships    Social connections     Talks on phone: Not on file     Gets together: Not on file     Attends [employer]ism service: Not on file     Active member of club or organization: Not on file     Attends meetings of clubs or organizations: Not on file     Relationship status: Not on file    Intimate partner violence Fear of current or ex partner: Not on file     Emotionally abused: Not on file     Physically abused: Not on file     Forced sexual activity: Not on file   Other Topics Concern    Not on file   Social History Narrative    Not on file       Review of Systems  CONSTITUTIONAL: No fevers, chills, diaphoresis  HEENT: No epistaxis, tinnitus  EYES: No diplopia, blurry vision. CARDIOVASCULAR:  No chest pain, palpitations, lower extremity edema  PULM: No dyspnea, tachypnea, wheezing  GI: No nausea, vomiting, constipation, diarrhea  : No dysuria, gross hematuria, or pyuria  NEURO:  No new balance problems, peripheral weakness, paresthesias, numbness  MSK: + pain to both feet  PSY: No concerns regarding depression, anxiety  INTEGUMENTARY: no open lesions, dependent rubor and LLE swelling    OBJECTIVE:  /81   Pulse 81   Temp 98.6 °F (37 °C) (Oral)   Resp 18   Ht 5' (1.524 m)   Wt 78 lb 12.8 oz (35.7 kg)   LMP  (LMP Unknown)   SpO2 99%   BMI 15.39 kg/m²   Patient is alert and oriented to person, place, and time    Vascular:   Palpable Dorsalis Pedis and Palpable Posterior Tibial Pulses B/L   Capillary Fill time < 3 seconds to B/L digits  Skin temperature warm to warm tibial tuberosity to the digits B/L  Hair growth absent to digits  +3 pitting edema to left leg with dependent rubor noted  Multiple varicosities to legs    Neurological:   Sensation to light touch intact B/L  Protective sensation via monofilament testing intact B/L    Musculoskeletal/Orthopaedic:   5/5 muscle strength Dorsiflexion, Plantarflexion, Inversion, Eversion B/L  +tenderness on palpation of lesions on right foot as noted and left lower leg diffusely    Dermatological:   Skin appears well hydrated and supple with good temperature, texture, turgor. Left foot and lower leg appears to have increased erythema and +3 pitting edema. Upon elevation above heart level, edema and erythema decreased.   Hyperkeratotic lesion noted to the lateral aspect of 2nd digit and medial aspect of 3rd digit of right foot. No open wound. Mild macerated tissue surrounding. No drainage. No malodor. No signs of infection. Nails 1-5 B/L appear normal in length and thickness  Interspaces 1-4 B/L are clear and without debris  No open lesions noted B/L    LABS:   Lab Results   Component Value Date    WBC 9.4 02/28/2021    HGB 12.6 02/28/2021    HCT 36.5 (L) 02/28/2021    MCV 96.9 02/28/2021     (H) 02/28/2021     Lab Results   Component Value Date     03/01/2021    K 3.1 03/01/2021    K 4.4 02/09/2021    CL 94 03/01/2021    CO2 26 03/01/2021    BUN 7 03/01/2021    CREATININE 0.41 03/01/2021    GLUCOSE 98 03/01/2021    CALCIUM 8.4 03/01/2021      Lab Results   Component Value Date    LABALBU 3.9 02/28/2021     No results found for: SEDRATE  No results found for: CRP  No results found for: LABA1C    MICROBIOLOGY:   N/a    IMAGING:   XR left foot 2/28/21:  Impression                                                                                      No acute osseous abnormality.         Venous duplex LLE 2/28/21:  CONCLUSION:       NO EVIDENCE FOR DEEP VENOUS THROMBOSIS FROM THE LEVEL OF THE COMMON FEMORAL VEIN TO THE POPLITEAL FOSSA IN THE LEFT LEG(S).       BAKER'S CYST.          Brittany Dupree DPM PGY-1  Podiatric Surgery Resident  Podiatry On Call Pager: [phone]  March 1, 2021  11:28 AM

## 2021-03-01 NOTE — ED NOTES
Pt incontinent of urine again pt was able to clean herself up while I changed to sheets on her bed pt placed on a mayawialva Gunter  02/28/21 8067

## 2021-03-01 NOTE — PROGRESS NOTES
Progress Note  Date:3/1/2021       DKFS:[id number]  [facility]     YOB: 1960     Age:60 y.o. Subjective    Subjective:  Symptoms:  She reports malaise, chest pain and weakness. No shortness of breath, cough, headache, chest pressure, anorexia, diarrhea or anxiety. Diet:  No nausea or vomiting. Review of Systems   Respiratory: Negative for cough and shortness of breath. Cardiovascular: Positive for chest pain. Gastrointestinal: Negative for anorexia, diarrhea, nausea and vomiting. Neurological: Positive for weakness. Objective         Vitals Last 24 Hours:  TEMPERATURE:  Temp  Av °F (36.7 °C)  Min: 97.7 °F (36.5 °C)  Max: 98.6 °F (37 °C)  RESPIRATIONS RANGE: Resp  Av.5  Min: 18  Max: 20  PULSE OXIMETRY RANGE: SpO2  Av.8 %  Min: 96 %  Max: 100 %  PULSE RANGE: Pulse  Av.8  Min: 77  Max: 86  BLOOD PRESSURE RANGE: Systolic (09OYN), XSQ:289 , Min:119 , EHK:872   ; Diastolic (42JPO), KXK:48, Min:64, Max:93    I/O (24Hr): Intake/Output Summary (Last 24 hours) at 3/1/2021 1220  Last data filed at 3/1/2021 1030  Gross per 24 hour   Intake 1245 ml   Output --   Net 1245 ml     Objective:  General Appearance:  Well-appearing, in no acute distress and comfortable. Vital signs: (most recent): Blood pressure 119/81, pulse 81, temperature 98.6 °F (37 °C), temperature source Oral, resp. rate 18, height 5' (1.524 m), weight 78 lb 12.8 oz (35.7 kg), SpO2 99 %. HEENT: Normal HEENT exam.    Lungs:  Normal effort. Heart: Normal rate. Abdomen: Abdomen is soft. Bowel sounds are normal.   There is no epigastric area or suprapubic area tenderness. Pulses: Distal pulses are intact. Neurological: Patient is alert and oriented to person, place and time. Pupils:  Pupils are equal, round, and reactive to light. Skin:  Warm.       Labs/Imaging/Diagnostics    Labs:  CBC:  Recent Labs     21   WBC 9.4   RBC 3.77*   HGB 12.6   HCT 36.5*   MCV 96.9   RDW 13.1   *     CHEMISTRIES:  Recent Labs     02/28/21 2015 03/01/21  0545 03/01/21  0959   * 130* 129*   K 3.2* 3.0* 3.1*   CL 85* 96 94*   CO2 26 24 26   BUN 8 6* 7*   CREATININE 0.33* 0.33* 0.41*   GLUCOSE 86 86 98   MG 1.6* 2.3  --      PT/INR:  Recent Labs     02/28/21 2000   PROTIME 12.7   INR 0.9     APTT:  Recent Labs     02/28/21 2000   APTT 32.8     LIVER PROFILE:  Recent Labs     02/28/21 2015   AST 46*   ALT 30   BILITOT 0.7   ALKPHOS 107       Imaging Last 24 Hours:  Xr Foot Left (min 3 Views)    Result Date: 3/1/2021  EXAM: XR FOOT LEFT (MIN 3 VIEWS) COMPARISON: None available HISTORY:   Foot swelling and redness TECHNIQUE: AP, lateral and oblique views of the foot obtained. FINDINGS: No acute fracture or dislocation. Joint spaces are preserved. Soft tissue edema of the midfoot. No acute osseous abnormality. Ct Head Wo Contrast    Result Date: 3/1/2021  CT Brain. Contrast medium:  without contrast.. History:  Fall. Head injury. . Technical factors: CT imaging of the brain was obtained and formatted as 5 mm contiguous axial images. 2.5 mm contiguous axial images were obtained through the osseous structures. Sagittal and coronal reconstruction obtained during postprocessing. Comparison:  CT brain, February 8, 2021, December 13, 2019. David Wheeler Findings: Extra-axial spaces:  Normal. Intracranial hemorrhage:  None. Ventricular system: Ventricles mildly enlarged. Sulci mildly prominent. Basal Cisterns:  Normal. Cerebral Parenchyma:  Bilateral symmetric periventricular areas decreased attenuation. Midline Shift:  None. Cerebellum:  Normal. Paranasal sinuses and mastoid air cells:  Normal. Visualized Orbits:  Normal.     Impression: Mild cerebral atrophy. Chronic ischemic white matter disease.  All CT scans at this facility use dose modulation, iterative reconstruction, and/or weight based dosing when appropriate to reduce radiation dose to as low as reasonably achievable. Cta Chest W Wo Contrast    Result Date: 3/1/2021  CT PULMONARY ANGIOGRAM WITH INTRAVENOUS CONTRAST MEDIUM. REASON FOR EXAMINATION:  CHEST PAIN TECHNIQUE: Helical CTA was performed through the chest utilizing 100 mL of Isovue-300 intravenous contrast.  Images were obtained with bolus tracking in order to opacify the pulmonary arteries. Thick section coronal MIP 3D reconstructions were performed  on a separate workstation. COMPARISON:none FINDINGS:  Pulmonary arteries: No intraluminal filling defects. Thoracic aorta: Normal in course and caliber. Cardiac Size: Normal. Pericardial effusion: None. Right lung: No nodules, masses, consolidation, pleural effusion, pneumothorax. Emphysematous change. Left lung: No nodules, masses, pleural effusion, pneumothorax. Emphysematous change. Small area of dependent subsegmental atelectatic change. Lymph nodes: No hilar, mediastinal, or axillary lymph node enlargement. Upper abdomen:Limited imaging upper abdomen shows no gross anomaly. Musculoskeletal:No osteoblastic, and no osteolytic lesions. This space narrowing, thoracic spine, greatest at T7-T8. No CT evidence pulmonary embolism. Emphysema. All CT scans at this facility use dose modulation, iterative reconstruction, and/or weight based dosing when appropriate to reduce radiation dose to as low as reasonably achievable. Ct Abdomen Pelvis W Iv Contrast    Result Date: 3/1/2021  EXAM:  CT ABDOMEN PELVIS W IV CONTRAST History: Right upper quadrant abdominal pain. Technique: Multiple contiguous axial images were obtained of the abdomen and pelvis from an level of the lung bases through the ischial tuberosities with IV contrast. Multiplanar reformats were obtained. Delayed images were obtained.  Comparison: CT abdomen pelvis from February 6, 2021 Findings: Unchanged tiny hypodensity within the right lower liver is too small to definitively characterize but likely a cyst. The liver is otherwise unremarkable. The stomach, spleen, pancreas, and adrenal glands are within normal limits. The kidneys enhance uniformly. No urinary tract calculi or hydronephrosis. Circumferential urinary bladder wall thickening. Uterus is surgically absent. Abdominal aorta is nonaneurysmal  and demonstrates atherosclerotic calcification. 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 No retroperitoneal or abdominal/pelvic lymphadenopathy. No small bowel obstruction. No overt colonic mass or pericolonic inflammation. Appendix is within normal limits. No free fluid or free air. Degenerative changes of the spine are again identified and not significantly changed, including 9 mm of anterolisthesis of L4 on L5 and severe spinal canal stenosis at L4-L5. Circumferential urinary bladder wall thickening is concerning for cystitis for which correlation is recommended. Additional stable chronic findings as detailed. All CT scans at this facility use dose modulation, iterative reconstruction, and/or weight based dosing when appropriate to reduce radiation dose to as low as reasonably achievable. Us Dup Lower Extremity Left Jareth    Result Date: 3/1/2021  LEFT LOWER EXTREMITY VENOUS DUPLEX EXAM: REASON FOR EXAMINATION: Left calf tenderness. A venous duplex exam of the left lower extremity was obtained. The following veins were evaluated, including the common femoral, femoral, popliteal and calf veins. The veins were evaluated with color Doppler imaging, compression and augmentation if possible. All the veins demonstrated show no evidence for deep venous thrombosis.  There is a fluid collection within the left popliteal fossa which measures 2.7 x 1.5 x 1.1 cm, suspected Baker's cyst. CONCLUSION: NO EVIDENCE FOR DEEP VENOUS THROMBOSIS FROM THE LEVEL OF THE COMMON FEMORAL VEIN TO THE POPLITEAL FOSSA IN THE LEFT LEG(S). BAKER'S CYST. Assessment//Plan           Hospital Problems           Last Modified POA    * (Principal) Hyponatremia 2/28/2021 Yes    Hypokalemia 2/28/2021 Yes    Alcohol use 2/28/2021 Yes    Repeated falls 2/28/2021 Yes    Hypomagnesemia 2/28/2021 Yes        Assessment & Plan  1) chest pain  2) hyponatremia, hypoaklemia  3) etoh abuse  4) repeated fall  5) Vanita Naveen cellultiis recently stepped on broken glass  Cardiology evaluation, calcium was given EtOH abuse and tobacco use. Replace potassium. Magnesium is better. Follow-up cardiology for chest pain. Continue with telemetry. echo echocardiogram.  Anticipate discharge in 1 to 2 days. Start Unasyn for Strep cellultiis.   Electronically signed by Barbara Mas MD on 3/1/21 at 12:20 PM EST

## 2021-03-01 NOTE — PROGRESS NOTES
Physical Therapy Med Surg Initial Assessment  Facility/Department: [facility] TELEMETRY  [facility]       NAME: Ling Jernigan  : 1960 (61 y.o.)  MRN: [id number]  CODE STATUS: Full Code    Date of Service: 3/1/2021    Patient Diagnosis(es): Hyponatremia [E87.1]   Chief Complaint   Patient presents with    Fatigue    Foot Pain     bi-lat swelling      Patient Active Problem List    Diagnosis Date Noted    Repeated falls 2021    Hypomagnesemia     Metabolic encephalopathy     Encephalopathy acute     HTN (hypertension) 2021    Hypokalemia 2021    Alcohol use 2021    Elevated liver enzymes 2021    Hyponatremia 2021        Past Medical History:   Diagnosis Date    Cancer ([facility] [address]     [facility]     ETOH abuse      History reviewed. No pertinent surgical history.     Chart Reviewed: Yes  Family / Caregiver Present: No    Restrictions:  Restrictions/Precautions: Fall Risk, Seizure     SUBJECTIVE:      Pain       Pre and post Treatment Pain Screenin/10       Prior Level of Function:  Social/Functional History  Lives With: Significant other(he recently had a stroke and may not be able to assist pt)  Type of Home: Apartment  Home Layout: One level  Home Access: Stairs to enter with rails  Entrance Stairs - Number of Steps: 8-11 with one rail  Bathroom Shower/Tub: Tub/Shower unit(sits in the bottom of the tub to bath)  ADL Assistance: Independent  Ambulation Assistance: Independent  Transfer Assistance: Independent  Active : No  Additional Comments: Has had 8 in about a one week time frame - unable to state how the falls occurred except that someitmes she fell forwards    OBJECTIVE:   Vision: Within Functional Limits  Hearing: Within functional limits    Cognition:  Overall Orientation Status: Impaired  Orientation Level: Oriented to person  Follows Commands: Within Functional Limits(pt verbose and occasionally off topic)    Observation/Palpation  Observation: left foot reddened - no pain -xray negative for fx and DVT    ROM:  RLE PROM: WFL  LLE PROM: WFL    Strength:  Strength RLE  Strength RLE: WFL  Strength LLE  Strength LLE: WFL    Neuro:                Bed mobility  Bridging: Modified independent   Rolling to Left: Modified independent  Rolling to Right: Modified independent  Supine to Sit: Modified independent  Sit to Supine: Modified independent  Comment: mild safety concern noted    Transfers  Sit to Stand: Contact guard assistance;Minimal Assistance  Stand to sit: Contact guard assistance;Minimal Assistance  Comment: increased posterior sway required assistance for recovery    Ambulation  Ambulation?: Yes  More Ambulation?: Yes  Ambulation 1  Surface: level tile  Device: No Device  Assistance: Minimal assistance  Quality of Gait: NBOS, short step length, decreased trunk rotation, inconsistent balance and ability to recover, knees stable  Distance: 25 feet  Ambulation 2  Surface - 2: level tile  Device 2: Rolling Walker  Assistance 2: Minimal assistance  Quality of Gait 2: inconsistent ability to manage walker, short step length, improved balance              Activity Tolerance  Activity Tolerance: Patient Tolerated treatment well          PT Education  PT Education: PT Role;Goals;Plan of Care  Patient Education: pt agreeable to therapy    ASSESSMENT:   Body structures, Functions, Activity limitations: Decreased safe awareness;Decreased balance;Decreased coordination;Decreased endurance;Decreased strength  Decision Making: High Complexity  History: high  Exam: high  Clinical Presentation: high    Patient Education: pt agreeable to therapy  Barriers to Learning: ?cognition    DISCHARGE RECOMMENDATIONS:  Discharge Recommendations: Continue to assess pending progress    Assessment: Pt demonstrates deficits as listed. She is requiring assistance with mobility and has had up to 8 falls in the week prior to admission.  Pt would benefit from PT prior to return to home to determie safest mobility devices  REQUIRES PT FOLLOW UP: Yes      PLAN OF CARE:  Plan  Times per week: 3-6  Current Treatment Recommendations: Transfer Training, Endurance Training, Neuromuscular Re-education, Equipment Evaluation, Education, & procurement, Balance Training, Gait Training, Functional Mobility Training, Safety Education & Training  Plan Comment: try rollator  Safety Devices  Type of devices: Bed alarm in place, Call light within reach, Left in bed    Goals:  Long term goals  Long term goal 1: indep bed mobility  Long term goal 2: indep transfers - bed and sit to stand  Long term goal 3: indep gait with or without device 50 feet in protected environment  Long term goal 4: pt able to perform TUG in 12 sec or less with or without device    AMPAC (6 CLICK) BASIC MOBILITY  AM-PAC Inpatient Mobility Raw Score : 19     Therapy Time:   Individual   Time In 1440   Time Out 1505   Minutes [address] [place] [address], 03/01/21 at 3:15 PM         Definitions for assistance levels  Independent = pt does not require any physical supervision or assistance from another person for activity completion. Device may be needed.   Stand by assistance = pt requires verbal cues or instructions from another person, close to but not touching, to perform the activity  Minimal assistance= pt performs 75% or more of the activity; assistance is required to complete the activity  Moderate assistance= pt performs 50% of the activity; assistance is required to complete the activity  Maximal assistance = pt performs 25% of the activity; assistance is required to complete the activity  Dependent = pt requires total physical assistance to accomplish the task

## 2021-03-01 NOTE — H&P
Julio Cesar  MEDICINE    HISTORY AND PHYSICAL EXAM    PATIENT NAME:  Jorgito Morel    MRN:  [id number]  SERVICE DATE:  2/28/2021   SERVICE TIME:  11:55 PM    Primary Care Physician: Anabela Bates MD         SUBJECTIVE  CHIEF COMPLAINT: Repeated falls and decreased appetite. HPI: Patient being admitted for hyponatremia and repeated falls. In addition work-up in the ED showed mild hypokalemia and hypomagnesemia. Patient came to the ER today because she states that she has not been feeling well. Upon my entering the room the patient states \"I feel a lot better and I feel like I can go home now\". She states that she has been falling repeatedly because she gets dizzy. In addition patient states that she has pain in her feet but worse in the left leg which is swollen due to an accidental injury. She states that her boyfriend had it with his boot by accident. Patient is not a clear historian as she cannot tell you the types of medications she takes or what she takes medications for. Patient does admit to normally drinking beer daily. However patient continually changes her story as to how much she drinks. She says she usually has a 24 ounce can of beer a day but none in the past 2 days. She says that over the past week she has had between 5 to 8 cans of beer that are 24 ounces. She says in the past few days she has had nausea and vomited several times. She states 2 days ago she had 2-3 episodes of vomiting and one yesterday. Patient denies abdominal pain, constipation, or diarrhea. .  Patient denies dysuria. Patient reports no recent fever, cough, acute chest pain, acute neck or back pain. Patient does report that she has neck and back pain that are related to falls over the past year. In addition, she says her chest pain is her rib under her arm that has bothered her for the past year and that was from a fall several months ago.     PAST MEDICAL HISTORY:    Past Medical History:   Diagnosis Date    Cancer [facility])     Cervical cancer ([facility] [address]     ETOH abuse      PAST SURGICAL HISTORY:  History reviewed. No pertinent surgical history. FAMILY HISTORY:  History reviewed. No pertinent family history. SOCIAL HISTORY:    Social History     Socioeconomic History    Marital status: Single     Spouse name: Not on file    Number of children: Not on file    Years of education: Not on file    Highest education level: Not on file   Occupational History    Not on file   Social Needs    Financial resource strain: Not on file    Food insecurity     Worry: Not on file     Inability: Not on file    Transportation needs     Medical: Not on file     Non-medical: Not on file   Tobacco Use    Smoking status: Current Every Day Smoker    Smokeless tobacco: Never Used   Substance and Sexual Activity    Alcohol use: Yes     Comment: occassional    Drug use: No    Sexual activity: Not on file   Lifestyle    Physical activity     Days per week: Not on file     Minutes per session: Not on file    Stress: Not on file   Relationships    Social connections     Talks on phone: Not on file     Gets together: Not on file     Attends [employer] service: Not on file     Active member of club or organization: Not on file     Attends meetings of clubs or organizations: Not on file     Relationship status: Not on file    Intimate partner violence     Fear of current or ex partner: Not on file     Emotionally abused: Not on file     Physically abused: Not on file     Forced sexual activity: Not on file   Other Topics Concern    Not on file   Social History Narrative    Not on file     MEDICATIONS:   Prior to Admission medications    Medication Sig Start Date End Date Taking?  Authorizing Provider   Vitamin D (CHOLECALCIFEROL) 50 MCG (2000 UT) TABS tablet Take 1 tablet by mouth daily 2/10/21   Jose Sotomayor DO   potassium chloride (KLOR-CON M) 20 MEQ extended release tablet Take 1 tablet by mouth daily 2/10/21   Hal Clarke DO Asher   amLODIPine (NORVASC) 2.5 MG tablet Take 2.5 mg by mouth daily    Historical Provider, MD   ammonium lactate (LAC-HYDRIN) 12 % lotion Apply topically as needed for Dry Skin Apply topically as needed. Historical Provider, MD       ALLERGIES: Zantac [ranitidine hcl]    REVIEW OF SYSTEM:   Review of Systems   Constitutional: Positive for appetite change and fatigue. Negative for fever and unexpected weight change. HENT: Negative for congestion, rhinorrhea and sore throat. Eyes: Negative. Negative for photophobia and visual disturbance. Respiratory: Negative for cough, shortness of breath and wheezing. Cardiovascular: Positive for chest pain. Negative for leg swelling. Gastrointestinal: Positive for nausea and vomiting. Negative for abdominal pain, constipation and diarrhea. Endocrine: Negative. Negative for polydipsia, polyphagia and polyuria. Genitourinary: Negative for decreased urine volume, difficulty urinating, dysuria, flank pain and pelvic pain. Musculoskeletal: Positive for back pain and neck pain. Skin: Negative. Negative for rash and wound. Allergic/Immunologic: Negative. Neurological: Positive for dizziness. Negative for speech difficulty and weakness. Hematological: Negative. Psychiatric/Behavioral: Negative. Negative for behavioral problems, confusion and self-injury. OBJECTIVE  PHYSICAL EXAM: BP (!) 159/93   Pulse 83   Temp 97.8 °F (36.6 °C) (Oral)   Resp 20   Ht 5' (1.524 m)   Wt 85 lb (38.6 kg)   LMP  (LMP Unknown)   SpO2 100%   BMI 16.60 kg/m²     Physical Exam  Vitals signs and nursing note reviewed. Constitutional:       General: She is not in acute distress. Appearance: She is well-developed. She is not ill-appearing. HENT:      Head: Normocephalic and atraumatic. Nose: Nose normal.   Eyes:      Pupils: Pupils are equal, round, and reactive to light. Neck:      Musculoskeletal: Normal range of motion.    Cardiovascular: Rate and Rhythm: Normal rate and regular rhythm. Pulses: Normal pulses. Heart sounds: Normal heart sounds. Pulmonary:      Effort: Pulmonary effort is normal. No respiratory distress. Breath sounds: Normal breath sounds. No wheezing, rhonchi or rales. Abdominal:      General: Bowel sounds are normal. There is no distension. Palpations: Abdomen is soft. There is no mass. Tenderness: There is no abdominal tenderness. There is no guarding or rebound. Hernia: No hernia is present. Musculoskeletal: Normal range of motion. General: Swelling present. Skin:     General: Skin is warm and dry. Capillary Refill: Capillary refill takes less than 2 seconds. Findings: Erythema present. No abscess or lesion. Neurological:      Mental Status: She is alert and oriented to person, place, and time. DATA:     Diagnostic tests reviewed for today's visit:    Most recent labs and imaging results reviewed.      LABS:    Recent Results (from the past 24 hour(s))   Protime-INR    Collection Time: 02/28/21  8:00 PM   Result Value Ref Range    Protime 12.7 12.3 - 14.9 sec    INR 0.9    APTT    Collection Time: 02/28/21  8:00 PM   Result Value Ref Range    aPTT 32.8 24.4 - 36.8 sec   Ammonia    Collection Time: 02/28/21  8:00 PM   Result Value Ref Range    Ammonia 18 11 - 51 umol/L       Collection Time: 02/28/21  8:00 PM   Result Value Ref Range     6.9 0.0 - 35.0 U/mL   CEA    Collection Time: 02/28/21  8:00 PM   Result Value Ref Range    CEA 4.7 0.0 - 5.5 ng/mL   CBC Auto Differential    Collection Time: 02/28/21  8:00 PM   Result Value Ref Range    WBC 9.4 4.8 - 10.8 K/uL    RBC 3.77 (L) 4.20 - 5.40 M/uL    Hemoglobin 12.6 12.0 - 16.0 g/dL    Hematocrit 36.5 (L) 37.0 - 47.0 %    MCV 96.9 82.0 - 100.0 fL    MCH 33.3 (H) 27.0 - 31.3 pg    MCHC 34.4 33.0 - 37.0 %    RDW 13.1 11.5 - 14.5 %    Platelets 427 (H) 140 - 400 K/uL    Neutrophils % 72.1 % Lymphocytes % 12.5 %    Monocytes % 15.0 %    Eosinophils % 0.0 %    Basophils % 0.4 %    Neutrophils Absolute 6.8 (H) 1.4 - 6.5 K/uL    Lymphocytes Absolute 1.2 1.0 - 4.8 K/uL    Monocytes Absolute 1.4 (H) 0.2 - 0.8 K/uL    Eosinophils Absolute 0.0 0.0 - 0.7 K/uL    Basophils Absolute 0.0 0.0 - 0.2 K/uL   Hepatitis Panel, Acute    Collection Time: 02/28/21  8:00 PM   Result Value Ref Range    Hep A IgM Non-reactive     Hep B Core Ab, IgM Non-reactive     Hep B S Ag Interp Non-reactive     Hep C Ab Interp Non-reactive     Hepatitis Interpretation: see below    Ethanol    Collection Time: 02/28/21  8:00 PM   Result Value Ref Range    Ethanol Lvl <10 mg/dL    Ethanol percent Not indicated G/dL   Lactic Acid, Plasma    Collection Time: 02/28/21  8:00 PM   Result Value Ref Range    Lactic Acid 1.0 0.5 - 2.2 mmol/L   Comprehensive Metabolic Panel    Collection Time: 02/28/21  8:15 PM   Result Value Ref Range    Sodium 123 (L) 135 - 144 mEq/L    Potassium 3.2 (L) 3.4 - 4.9 mEq/L    Chloride 85 (L) 95 - 107 mEq/L    CO2 26 20 - 31 mEq/L    Anion Gap 12 9 - 15 mEq/L    Glucose 86 70 - 99 mg/dL    BUN 8 8 - 23 mg/dL    CREATININE 0.33 (L) 0.50 - 0.90 mg/dL    GFR Non-African American >60.0 >60    GFR  >60.0 >60    Calcium 9.5 8.5 - 9.9 mg/dL    Total Protein 7.6 6.3 - 8.0 g/dL    Albumin 3.9 3.5 - 4.6 g/dL    Total Bilirubin 0.7 0.2 - 0.7 mg/dL    Alkaline Phosphatase 107 40 - 130 U/L    ALT 30 0 - 33 U/L    AST 46 (H) 0 - 35 U/L    Globulin 3.7 (H) 2.3 - 3.5 g/dL   GAMMA GT    Collection Time: 02/28/21  8:15 PM   Result Value Ref Range    GGT 17 0 - 40 U/L   Magnesium    Collection Time: 02/28/21  8:15 PM   Result Value Ref Range    Magnesium 1.6 (L) 1.7 - 2.4 mg/dL   Lipase    Collection Time: 02/28/21  8:15 PM   Result Value Ref Range    Lipase 44 12 - 95 U/L   Troponin    Collection Time: 02/28/21  8:15 PM   Result Value Ref Range    Troponin <0.010 0.000 - 0.010 ng/mL   CK    Collection Time: 02/28/21

## 2021-03-02 VITALS
OXYGEN SATURATION: 100 % | HEART RATE: 65 BPM | HEIGHT: 60 IN | DIASTOLIC BLOOD PRESSURE: 72 MMHG | WEIGHT: 78.8 LBS | RESPIRATION RATE: 19 BRPM | BODY MASS INDEX: 15.47 KG/M2 | SYSTOLIC BLOOD PRESSURE: 126 MMHG | TEMPERATURE: 97.7 F

## 2021-03-02 LAB
ALBUMIN SERPL-MCNC: 3.1 G/DL (ref 3.5–4.6)
ALP BLD-CCNC: 70 U/L (ref 40–130)
ALT SERPL-CCNC: 20 U/L (ref 0–33)
ANION GAP SERPL CALCULATED.3IONS-SCNC: 10 MEQ/L (ref 9–15)
AST SERPL-CCNC: 26 U/L (ref 0–35)
BILIRUB SERPL-MCNC: 0.3 MG/DL (ref 0.2–0.7)
BUN BLDV-MCNC: 6 MG/DL (ref 8–23)
CALCIUM SERPL-MCNC: 8.5 MG/DL (ref 8.5–9.9)
CHLORIDE BLD-SCNC: 98 MEQ/L (ref 95–107)
CO2: 23 MEQ/L (ref 20–31)
CREAT SERPL-MCNC: 0.32 MG/DL (ref 0.5–0.9)
GFR AFRICAN AMERICAN: >60
GFR NON-AFRICAN AMERICAN: >60
GLOBULIN: 2.8 G/DL (ref 2.3–3.5)
GLUCOSE BLD-MCNC: 101 MG/DL (ref 70–99)
POTASSIUM SERPL-SCNC: 3.3 MEQ/L (ref 3.4–4.9)
SODIUM BLD-SCNC: 131 MEQ/L (ref 135–144)
TOTAL PROTEIN: 5.9 G/DL (ref 6.3–8)

## 2021-03-02 PROCEDURE — 36415 COLL VENOUS BLD VENIPUNCTURE: CPT

## 2021-03-02 PROCEDURE — 97116 GAIT TRAINING THERAPY: CPT

## 2021-03-02 PROCEDURE — 6360000002 HC RX W HCPCS: Performed by: INTERNAL MEDICINE

## 2021-03-02 PROCEDURE — 2580000003 HC RX 258: Performed by: NURSE PRACTITIONER

## 2021-03-02 PROCEDURE — 80053 COMPREHEN METABOLIC PANEL: CPT

## 2021-03-02 PROCEDURE — 6370000000 HC RX 637 (ALT 250 FOR IP): Performed by: NURSE PRACTITIONER

## 2021-03-02 PROCEDURE — 2580000003 HC RX 258: Performed by: INTERNAL MEDICINE

## 2021-03-02 PROCEDURE — 6370000000 HC RX 637 (ALT 250 FOR IP): Performed by: INTERNAL MEDICINE

## 2021-03-02 RX ORDER — POTASSIUM CHLORIDE 20 MEQ/1
20 TABLET, EXTENDED RELEASE ORAL ONCE
Status: COMPLETED | OUTPATIENT
Start: 2021-03-02 | End: 2021-03-02

## 2021-03-02 RX ORDER — CEPHALEXIN 500 MG/1
500 CAPSULE ORAL 3 TIMES DAILY
Qty: 15 CAPSULE | Refills: 0 | Status: ON HOLD | OUTPATIENT
Start: 2021-03-02 | End: 2021-03-15 | Stop reason: HOSPADM

## 2021-03-02 RX ADMIN — Medication 100 MG: at 10:13

## 2021-03-02 RX ADMIN — SODIUM CHLORIDE 1500 MG: 900 INJECTION INTRAVENOUS at 02:55

## 2021-03-02 RX ADMIN — SODIUM CHLORIDE 1500 MG: 900 INJECTION INTRAVENOUS at 10:12

## 2021-03-02 RX ADMIN — AMLODIPINE BESYLATE 2.5 MG: 5 TABLET ORAL at 10:13

## 2021-03-02 RX ADMIN — ACETAMINOPHEN 650 MG: 325 TABLET ORAL at 11:12

## 2021-03-02 RX ADMIN — SODIUM CHLORIDE, PRESERVATIVE FREE 10 ML: 5 INJECTION INTRAVENOUS at 10:18

## 2021-03-02 RX ADMIN — POTASSIUM CHLORIDE 20 MEQ: 1500 TABLET, EXTENDED RELEASE ORAL at 11:08

## 2021-03-02 ASSESSMENT — PAIN SCALES - GENERAL: PAINLEVEL_OUTOF10: 10

## 2021-03-02 NOTE — CARE COORDINATION
[facility] Case Management Initial Discharge Assessment    The LSW talked to the patient to discuss the discharge plan. PCP: Ramana Holguin MD                                Date of Last Visit: Per the patient - she saw the physician 1 week ago. If no PCP, list provided? N/A    Discharge Planning    Living Arrangements: The patient stated she had been staying with her daughter, but is currently back home with her boyfriend. The LSW asked the patient if she feels safe at home and the patient states she feels safe at home. Who do you live with? Boyfriend    Who helps you with your care:  self    If lives at home:     Do you have any barriers navigating in your home? Patient has had frequent falls. Patient can perform ADL? Yes - per the patient    Current Services (outpatient and in home) :  None ([facility] in the past)    Dialysis: No    Is transportation available to get to your appointments? Yes - The patient states at times her son is available to take her to appointments. The LSW also discussed the patient contacting her insurance for possible transportation to medical appointments. DME Equipment:  No (PT recommends a cane for the patient - not a walker.)    Respiratory equipment: None    Respiratory provider:  no     Pharmacy:  [address] [place] [address] with Medication Assistance Program?  No      Patient agreeable to [facility]? Yes [facility]    Patient agreeable to SNF/Rehab? No - patient does not want to go to a rehab or SNF    Other discharge needs identified? Freedom of choice list provided with basic dialogue that supports the patient's individualized plan of care/goals and shares the quality data associated with the providers. Yes     Does Patient Have a High-Risk for Readmission Diagnosis (CHF, PN, MI, COPD)?     The plan for Transition of Care is related to the following treatment goals: Await medically stable    Initial Discharge Plan? (Note: please see concurrent daily documentation for any updates after initial note). The patient is alert and oriented per the RN. The patient states she does not want to go to a rehab or SNF at discharge. The patient would like to go home with her boyfriend and is agreeable to [facility]. Freedom of choice was discussed regarding HHC. The patient states she has had [facility] in the past and is agreeable to [facility]. The Patient and/or patient representative: The patient was provided with choice of any post-acute providers for care and equipment and agrees with discharge plan  Yes - The patient is aware she can go to [facility], [facility], [facility] for a cane. DCCOP was completed. The patient is a low risk for readmission (green).     Electronically signed by Moises Staples on 3/2/2021 at 12:23 PM

## 2021-03-02 NOTE — PROGRESS NOTES
PODIATRIC MEDICINE AND SURGERY  PROGRESS NOTE    Consulting Service:  Internal Medicine  Requesting Provider: Junior Curry MD  Opinion/advice regarding: foot evaluation  Staff Doctor:  Humera Lees DPM    ASSESSMENT:  61 y.o. female with PMH significant for cervical cancer and alcohol abuse who was admitted for hyponatremia, hypokalemia, and hypomagnesemia in the setting of repeated falls for who podiatry was consulted for B/L foot pain and LLE swelling. Hyperkeratotic lesions to 2nd and 3rd digits of right foot stable with no opening and signs of infection. LLE swelling/erythema with dependent rubor and no ulcers noted. Patient to be discharged today and can have arterial duplex study performed as outpatient. PLAN AND RECOMMENDATIONS[de-identified]  Patient's case to be discussed with staff, Dr. Giuliana Hernandez, who will provide final recommendations going forward  Recommend arterial duplex study as outpatient to assess vascular flow  Apply ace bandage wrap to left lower extremity to reduce edema  Elevate B/L lower legs at or above heart level while resting  Weight-bearing as tolerated to B/L LE  Pain management per primary team   OK to be discharged today from podiatry standpoint. Vascular work-up as outpatient    Interval HPI:   - States pain to right foot where calluses noted is feeling better since debridement yesterday  - LLE swelling and erythema still present  - Patient to be discharged today  - Patient denies nausea, vomiting, diarrhea, fevers, chills, chest pain, shortness of breath, head ache, or calf pain. No other pedal complaints. Past Medical History:   Diagnosis Date    Cancer [facility])     Cervical cancer ([facility] [address]     ETOH abuse        History reviewed. No pertinent surgical history. No current facility-administered medications on file prior to encounter.       Current Outpatient Medications on File Prior to Encounter   Medication Sig Dispense Refill    Vitamin D (CHOLECALCIFEROL) 50 MCG (2000 UT) TABS tablet Take 1 tablet by mouth daily 120 tablet 0    potassium chloride (KLOR-CON M) 20 MEQ extended release tablet Take 1 tablet by mouth daily 30 tablet 0    amLODIPine (NORVASC) 2.5 MG tablet Take 2.5 mg by mouth daily      ammonium lactate (LAC-HYDRIN) 12 % lotion Apply topically as needed for Dry Skin Apply topically as needed. Allergies   Allergen Reactions    Zantac [Ranitidine Hcl]        History reviewed. No pertinent family history.     Social History     Socioeconomic History    Marital status: Single     Spouse name: Not on file    Number of children: Not on file    Years of education: Not on file    Highest education level: Not on file   Occupational History    Not on file   Social Needs    Financial resource strain: Not on file    Food insecurity     Worry: Not on file     Inability: Not on file    Transportation needs     Medical: Not on file     Non-medical: Not on file   Tobacco Use    Smoking status: Current Every Day Smoker    Smokeless tobacco: Never Used   Substance and Sexual Activity    Alcohol use: Yes     Comment: occassional    Drug use: No    Sexual activity: Not on file   Lifestyle    Physical activity     Days per week: Not on file     Minutes per session: Not on file    Stress: Not on file   Relationships    Social connections     Talks on phone: Not on file     Gets together: Not on file     Attends [employer] service: Not on file     Active member of club or organization: Not on file     Attends meetings of clubs or organizations: Not on file     Relationship status: Not on file    Intimate partner violence     Fear of current or ex partner: Not on file     Emotionally abused: Not on file     Physically abused: Not on file     Forced sexual activity: Not on file   Other Topics Concern    Not on file   Social History Narrative    Not on file       Review of Systems  As noted in HPI    OBJECTIVE:  /72   Pulse 65   Temp 97.7 °F (36.5 °C) (Oral)   Resp 19   Ht 5' (1.524 m)   Wt 78 lb 12.8 oz (35.7 kg)   LMP  (LMP Unknown)   SpO2 100%   BMI 15.39 kg/m²   Patient is alert and oriented to person, place, and time    Vascular:   Palpable Dorsalis Pedis and Palpable Posterior Tibial Pulses B/L   Capillary Fill time < 3 seconds to B/L digits  Skin temperature warm to warm tibial tuberosity to the digits B/L  Hair growth absent to digits  +2 pitting edema to left leg with dependent rubor noted    Neurological:   Sensation to light touch intact B/L  Protective sensation via monofilament testing intact B/L    Musculoskeletal/Orthopaedic:   5/5 muscle strength Dorsiflexion, Plantarflexion, Inversion, Eversion B/L  +tenderness on palpation of lesions on right foot as noted and left lower leg diffusely    Dermatological:   Skin appears well hydrated and supple with good temperature, texture, turgor. Left foot and lower leg appears to have increased erythema and +2 pitting edema. Upon elevation above heart level, edema and erythema decreased. Hyperkeratotic lesion noted to the lateral aspect of 2nd digit and medial aspect of 3rd digit of right foot. No open wound. No drainage. No malodor. No signs of infection.   Nails 1-5 B/L appear normal in length and thickness  Interspaces 1-4 B/L are clear and without debris  No open lesions noted B/L    LABS:   Lab Results   Component Value Date    WBC 9.4 02/28/2021    HGB 12.6 02/28/2021    HCT 36.5 (L) 02/28/2021    MCV 96.9 02/28/2021     (H) 02/28/2021     Lab Results   Component Value Date     03/02/2021    K 3.3 03/02/2021    K 4.4 02/09/2021    CL 98 03/02/2021    CO2 23 03/02/2021    BUN 6 03/02/2021    CREATININE 0.32 03/02/2021    GLUCOSE 101 03/02/2021    CALCIUM 8.5 03/02/2021      Lab Results   Component Value Date    LABALBU 3.1 (L) 03/02/2021     No results found for: SEDRATE  No results found for: CRP  No results found for: LABA1C    MICROBIOLOGY:   N/a    IMAGING:   XR left foot 2/28/21:  Impression

## 2021-03-02 NOTE — FLOWSHEET NOTE
0830 In with Dr. Justo Brown to speak with pt in regards to her refusing to have her blood drawn. We discussed the importance to having her blood drawn prior to her leaving and she was agreeable. Lab contacted at this time to come draw. 1013 Assessment complete. Pt had ace bandage on her left calf. After removing no open sores or wounds noted. She has a small area on the back of her calf muscle that was healed. Over all patient has no swelling and no open wounds. She is resting in bed with no needs at this time. 1108 Oral potassium supplement given. 1215 In to see pt she is resting in bed. I came in after the  and explained to the the pt she can be discharged. But, she will need to  a cane to help her walk (as per PT recommendations)  Pt was agreeable to do so.   1227 [employer] [facility]. Dr. Justo Brown agreeable and will place. Pt made aware and she can contact her ride to come pick her up at 1330  1300 pt finishing lunch and we will go over d/c instructions thereafter   1515 dicussed d/c paperwork with pt at this time. Pt wheeled out to  from room by PCA.

## 2021-03-02 NOTE — PROGRESS NOTES
Exercises  Ankle Pumps: x10                               ASSESSMENT   Assessment: Pt inrc'd ability to ambulate with no device this date. Pt with dec stance time on LLE but demo's good pace with no LOB noted. Vc's given for upright posture to become more aware of surroundings with good carryover noted. Discharge Recommendations:  Continue to assess pending progress    Goals  Long term goals  Long term goal 1: indep bed mobility  Long term goal 2: indep transfers - bed and sit to stand  Long term goal 3: indep gait with or without device 50 feet in protected environment  Long term goal 4: pt able to perform TUG in 12 sec or less with or without device    PLAN    Times per week: 3-6  Safety Devices  Type of devices: All fall risk precautions in place, Bed alarm in place, Call light within reach, Left in bed     AMPA (6 CLICK) BASIC MOBILITY  AM-PAC Inpatient Mobility Raw Score : 19     Therapy Time   Individual   Time In 1400   Time Out 1416   Minutes 16     Gait: 13min  TherEx:2min  Bm/Trfr:1min       Starling NEYDA Plummer, 03/02/21 at 2:22 PM         Definitions for assistance levels  Independent = pt does not require any physical supervision or assistance from another person for activity completion. Device may be needed.   Stand by assistance = pt requires verbal cues or instructions from another person, close to but not touching, to perform the activity  Minimal assistance= pt performs 75% or more of the activity; assistance is required to complete the activity  Moderate assistance= pt performs 50% of the activity; assistance is required to complete the activity  Maximal assistance = pt performs 25% of the activity; assistance is required to complete the activity  Dependent = pt requires total physical assistance to accomplish the task

## 2021-03-02 NOTE — PROGRESS NOTES
Nephrology Progress Note    Assessment:  Hyponatremia relted to ETOH  No evidence of Cardiac ETOH heart  Cognitive issues      Plan:restrict fluids and as always stay away ETOH     Patient Active Problem List:     Hyponatremia     HTN (hypertension)     Hypokalemia     Alcohol use     Elevated liver enzymes     Encephalopathy acute     Metabolic encephalopathy     Repeated falls     Hypomagnesemia     Severe malnutrition (HCC)      Subjective:  Admit Date: 2/28/2021    Interval History: pleasant lying flat sleeping no issues upon awaken    Medications:  Scheduled Meds:   sodium chloride flush  10 mL Intravenous 2 times per day    enoxaparin  40 mg Subcutaneous Daily    thiamine  100 mg Oral Daily    amLODIPine  2.5 mg Oral Daily    Vitamin D  2,000 Units Oral Daily    ampicillin-sulbactam  1,500 mg Intravenous Q6H    nicotine  1 patch Transdermal Daily     Continuous Infusions:    CBC:   Recent Labs     02/28/21 2000   WBC 9.4   HGB 12.6   *     CMP:    Recent Labs     02/28/21 2015 03/01/21  0545 03/01/21  0959   * 130* 129*   K 3.2* 3.0* 3.1*   CL 85* 96 94*   CO2 26 24 26   BUN 8 6* 7*   CREATININE 0.33* 0.33* 0.41*   GLUCOSE 86 86 98   CALCIUM 9.5 8.4* 8.4*   LABGLOM >60.0 >60.0 >60.0     Troponin:   Recent Labs     03/01/21  1612   TROPONINI <0.010     BNP: No results for input(s): BNP in the last 72 hours. INR:   Recent Labs     02/28/21 2000   INR 0.9     Lipids:   Recent Labs     02/28/21 2015   LIPASE 44     Liver:   Recent Labs     02/28/21 2015   AST 46*   ALT 30   ALKPHOS 107   PROT 7.6   LABALBU 3.9   BILITOT 0.7     Iron:  No results for input(s): IRONS, FERRITIN in the last 72 hours. Invalid input(s): LABIRONS  Urinalysis: No results for input(s): UA in the last 72 hours.     Objective:  Vitals: /72   Pulse 65   Temp 97.7 °F (36.5 °C) (Oral)   Resp 19   Ht 5' (1.524 m)   Wt 78 lb 12.8 oz (35.7 kg)   LMP  (LMP Unknown)   SpO2 100%   BMI 15.39 kg/m²    Wt Readings from Last 3 Encounters:   03/01/21 78 lb 12.8 oz (35.7 kg)   02/10/21 83 lb 11.2 oz (38 kg)   12/20/19 90 lb (40.8 kg)      24HR INTAKE/OUTPUT:      Intake/Output Summary (Last 24 hours) at 3/2/2021 7099  Last data filed at 3/1/2021 1030  Gross per 24 hour   Intake 245 ml   Output --   Net 245 ml       General: alert, in no apparent distress  HEENT: normocephalic, atraumatic, anicteric  Neck: supple, no mass  Lungs: non-labored respirations, clear to auscultation bilaterally  Heart: regular rate and rhythm, no murmurs or rubs  Abdomen: soft, non-tender, non-distended  Ext: no cyanosis, no peripheral edema  Neuro: alert and oriented, no gross abnormalities  Psych: normal mood and affect  Skin: no rash      Electronically signed by Priti Flannery MD

## 2021-03-02 NOTE — DISCHARGE SUMMARY
sec    Status: Final              Comment: Effective 11/4/2020:Heparin Therapeutic Range: 64.0 - 98.0 seconds. Ammonia                                       Date: 02/28/2021Value: 18          Ref range: 11 - 51 umol/L     Status: FinalCA 125                                        Date: 02/28/2021Value: 6.9         Ref range: 0.0 - 35.0 U/mL    Status: FinalCEA                                           Date: 02/28/2021Value: 4.7         Ref range: 0.0 - 5.5 ng/mL    Status: Final              Comment: The CEA assay is an electrochemiluminescent immunoassayperformed on the [employer] e immunoassay analyzer. Non-smokers may have slightly lower levels of CEA. WBC                                           Date: 02/28/2021Value: 9.4         Ref range: 4.8 - 10.8 K/uL    Status: FinalRBC                                           Date: 02/28/2021Value: 3.77*       Ref range: 4.20 - 5.40 M/uL   Status: FinalHemoglobin                                    Date: 02/28/2021Value: 12.6        Ref range: 12.0 - 16.0 g/dL   Status: FinalHematocrit                                    Date: 02/28/2021Value: 36.5*       Ref range: 37.0 - 47.0 %      Status: FinalMCV                                           Date: 02/28/2021Value: 96.9        Ref range: 82.0 - 100.0 fL    Status: [address]                                           Date: 02/28/2021Value: 33.3*       Ref range: 27.0 - 31.3 pg     Status: [address]                                          Date: 02/28/2021Value: 34.4        Ref range: 33.0 - 37.0 %      Status: FinalRDW                                           Date: 02/28/2021Value: 13.1        Ref range: 11.5 - 14.5 %      Status: FinalPlatelets                                     Date: 02/28/2021Value: 450*        Ref range: 130 - 400 K/uL     Status: FinalNeutrophils %                                 Date: 02/28/2021Value: 72.1        Ref range: %                  Status: FinalLymphocytes %                                 Date: 02/28/2021Value: 12.5        Ref range: %                  Status: FinalMonocytes %                                   Date: 02/28/2021Value: 15.0        Ref range: %                  Status: FinalEosinophils %                                 Date: 02/28/2021Value: 0.0         Ref range: %                  Status: FinalBasophils %                                   Date: 02/28/2021Value: 0.4         Ref range: %                  Status: FinalNeutrophils Absolute                          Date: 02/28/2021Value: 6.8*        Ref range: 1.4 - 6.5 K/uL     Status: FinalLymphocytes Absolute                          Date: 02/28/2021Value: 1.2         Ref range: 1.0 - 4.8 K/uL     Status: FinalMonocytes Absolute                            Date: 02/28/2021Value: 1.4*        Ref range: 0.2 - 0.8 K/uL     Status: FinalEosinophils Absolute                          Date: 02/28/2021Value: 0.0         Ref range: 0.0 - 0.7 K/uL     Status: FinalBasophils Absolute                            Date: 02/28/2021Value: 0.0         Ref range: 0.0 - 0.2 K/uL     Status: FinalSodium                                        Date: 02/28/2021Value: 123*        Ref range: 135 - 144 mEq/L    Status: FinalPotassium                                     Date: 02/28/2021Value: 3.2*        Ref range: 3.4 - 4.9 mEq/L    Status: FinalChloride                                      Date: 02/28/2021Value: 85*         Ref range: 95 - 107 mEq/L     Status: FinalCO2                                           Date: 02/28/2021Value: 26          Ref range: 20 - 31 mEq/L      Status: FinalAnion Gap                                     Date: 02/28/2021Value: 12          Ref range: 9 - 15 mEq/L       Status: FinalGlucose                                       Date: 02/28/2021Value: 86          Ref range: 70 - 99 mg/dL      Status: FinalBUN                                           Date: 02/28/2021Value: 8           Ref range: 8 - 23 mg/dL       Status: [facility] [place] Date: 02/28/2021Value: Non-reactive                     Status: FinalHep B S Ag Interp                             Date: 02/28/2021Value: Non-reactive                     Status: FinalHep C Ab Interp                               Date: 02/28/2021Value: Non-reactive                     Status: FinalHepatitis Interpretation:                     Date: 02/28/2021Value: see below     Status: Final              Comment: The acute hepatitis panel is negative.  There is no evidence ofacute hepatitis A, B, C.Ethanol Lvl                                   Date: 02/28/2021Value: <10         Ref range: mg/dL              Status: FinalEthanol percent                               Date: 02/28/2021Value: Not indicated                   Ref range: G/dL               Status: FinalMagnesium                                     Date: 02/28/2021Value: 1.6*        Ref range: 1.7 - 2.4 mg/dL    Status: FinalLactic Acid                                   Date: 02/28/2021Value: 1.0         Ref range: 0.5 - 2.2 mmol/L   Status: FinalLipase                                        Date: 02/28/2021Value: 44          Ref range: 12 - 95 U/L        Status: FinalColor, UA                                     Date: 02/28/2021Value: Yellow      Ref range: Straw/Yellow       Status: FinalClarity, UA                                   Date: 02/28/2021Value: Clear       Ref range: Clear              Status: FinalGlucose, Ur                                   Date: 02/28/2021Value: Negative    Ref range: Negative mg/dL     Status: FinalBilirubin Urine                               Date: 02/28/2021Value: Negative    Ref range: Negative           Status: FinalKetones, Urine                                Date: 02/28/2021Value: TRACE*      Ref range: Negative mg/dL     Status: FinalSpecific Gravity, UA                          Date: 02/28/2021Value: 1.028       Ref range: 1.005 - 1.030      Status: FinalBlood, Urine                                  Date: 02/28/2021Value: Negative    Ref range: Negative           Status: FinalpH, UA                                        Date: 02/28/2021Value: 7.0         Ref range: 5.0 - 9.0          Status: FinalProtein, UA                                   Date: 02/28/2021Value: Negative    Ref range: Negative mg/dL     Status: FinalUrobilinogen, Urine                           Date: 02/28/2021Value: 0.2         Ref range: <2.0 E.U./dL       Status: FinalNitrite, Urine                                Date: 02/28/2021Value: Negative    Ref range: Negative           Status: FinalLeukocyte Esterase, Urine                     Date: 02/28/2021Value: Negative    Ref range: Negative           Status: FinalUrine Reflex to Culture                       Date: 02/28/2021Value: Not Indicated                     Status: FinalTroponin                                      Date: 02/28/2021Value: <0.010      Ref range: 0.000 - 0.010 ng*  Status: Final              Comment: Methodology by Troponin T. Total CK                                      Date: 02/28/2021Value: 545*        Ref range: 0 - 170 U/L        Status: FinalPro-BNP                                       Date: 02/28/2021Value: 366         Ref range: pg/mL              Status: Final              Comment: NT-pro BNP ACUTE Interpretive Guidelines:      Age        Cutoff for Heart Failure   Less than 50 yrs   450 pg/mL   50-75 yrs           900 pg/mL   Greater than 75 yrs  1800 pg/mLNT-pro BNP NON-ACUTE Interpretive Guidelines:    Age                 Reference Range  Less than 74 yrs   0-125 pg/mL  Greater than 74 yrs   0-450 pg/mLOther possible causes of an elevated NT-proBNP include:cardiac ischemia, acute coronary syndrome, COPD, pneumonia,atrial fibrillation, pulmonary emboli, pulmonary hypertension,pericarditisReference:ASHLEY Dodd et al. NT-proBNP testing for diagnosis andshort-term prognosis in acute destabilized HF: an internationalpooled analysis of 1256 patients.   Heart Journal.2006;27:330-337Procalcitonin                                 Date: 02/28/2021Value: 0.07        Ref range: 0.00 - 0.15 ng/mL  Status: Final              Comment: Suspected Sepsis:Low likelihood of sepsis  <.50 ng/mLIncreased likelihood of sepsis 0.50-2.00 ng/mLAntibiotics encouragedHigh risk of sepsis/shock   >2.00 ng/mLAntibiotics strongly encouragedSuspected Lower Respiratory Tract Infections:Low likelihood of bacterial infection  <0.24 ng/mLIncreased likelihood of bacterial infection >0.24 ng/mLAntibiotics encouragedWith successful antibiotic therapy, PCT levels should decreaserapidly. (Half-life of 24 to 36 hours. )Procalcitonin values from samples collected within the first6 hours of systemic infection may still be low. Retesting may be indicated. Values from day 1 and day 4 can be entered into the Change inProcalcitonin Calculator to determine the patient'sMortality Risk http://www.ya.info/. com)In healthy neonates, plasma Procalcitonin (PCT) concentrationsincrease gradually after birth, reaching peak values at about24 hours of age then decrease to normal values below 0.5                        ng/mLby 48-72 hours of age. Blood Culture, Routine                        Date: 02/28/2021Value: No Growth to date. Any change in status will be *                     Status: PreliminaryCulture, Blood 2                              Date: 02/28/2021Value: No Growth to date.   Any change in status will be *                     Status: PreliminaryCK-MB                                         Date: 02/28/2021Value: 35.6*       Ref range: 0.0 - 3.8 ng/mL    Status: FinalCK-MB Index                                   Date: 02/28/2021Value: 6.5*        Ref range: 0.0 - 3.5 %        Status: [id number], NAAT                              Date: 02/28/2021Value: Not Detected                   Ref range: Not Detected       Status: Final              Comment: Rapid NAAT:   Negative results should be treated as presumptive and,if inconsistent with clinical signs and symptoms or necessary forpatient management, should be tested with an alternative molecularassay. Negative results do not preclude SARS-CoV-2 infection andshould not be used as the sole basis for patient management decisions. This test has been authorized by the FDA under an Emergency UseAuthorization (EUA) for use by authorized laboratories. Fact sheet for Healthcare [employer] sheet for Patients: Claire.dk: Isothermal Nucleic Acid AmplificationSodium                                        Date: 03/01/2021Value: 130*        Ref range: 135 - 144 mEq/L    Status: FinalPotassium                                     Date: 03/01/2021Value: 3.0*        Ref range: 3.4 - 4.9 mEq/L    Status: FinalChloride                                      Date: 03/01/2021Value: 96          Ref range: 95 - 107 mEq/L     Status: FinalCO2                                           Date: 03/01/2021Value: 24          Ref range: 20 - 31 mEq/L      Status: FinalAnion Gap                                     Date: 03/01/2021Value: 10          Ref range: 9 - 15 mEq/L       Status: FinalGlucose                                       Date: 03/01/2021Value: 86          Ref range: 70 - 99 mg/dL      Status: FinalBUN                                           Date: 03/01/2021Value: 6*          Ref range: 8 - 23 mg/dL       Status: FinalCREATININE                                    Date: 03/01/2021Value: 0.33*       Ref range: 0.50 - 0.90 mg/dL  Status: FinalGFR Non-                      Date: 03/01/2021Value: >60.0       Ref range: >60                Status: Final              Comment: >60 mL/min/1.73m2 EGFR, calc. for ages 25 and older using theMDRD formula (not corrected for weight), is valid for stablerenal function. GFR                           Date: 03/01/2021Value: >60.0 Ref range: >60                Status: Final              Comment: >60 mL/min/1.73m2 EGFR, calc. for ages 25 and older using theMDRD formula (not corrected for weight), is valid for stablerenal function. Calcium                                       Date: 03/01/2021Value: 8.4*        Ref range: 8.5 - 9.9 mg/dL    Status: FinalMagnesium                                     Date: 03/01/2021Value: 2.3         Ref range: 1.7 - 2.4 mg/dL    Status: FinalTroponin                                      Date: 03/01/2021Value: <0.010      Ref range: 0.000 - 0.010 ng*  Status: Final              Comment: Methodology by Troponin T. Troponin                                      Date: 03/01/2021Value: <0.010      Ref range: 0.000 - 0.010 ng*  Status: Final              Comment: Methodology by Troponin T. Sodium                                        Date: 03/01/2021Value: 129*        Ref range: 135 - 144 mEq/L    Status: FinalPotassium                                     Date: 03/01/2021Value: 3.1*        Ref range: 3.4 - 4.9 mEq/L    Status: FinalChloride                                      Date: 03/01/2021Value: 94*         Ref range: 95 - 107 mEq/L     Status: FinalCO2                                           Date: 03/01/2021Value: 26          Ref range: 20 - 31 mEq/L      Status: FinalAnion Gap                                     Date: 03/01/2021Value: 9           Ref range: 9 - 15 mEq/L       Status: FinalGlucose                                       Date: 03/01/2021Value: 98          Ref range: 70 - 99 mg/dL      Status: FinalBUN                                           Date: 03/01/2021Value: 7*          Ref range: 8 - 23 mg/dL       Status: FinalCREATININE                                    Date: 03/01/2021Value: 0.41*       Ref range: 0.50 - 0.90 mg/dL  Status: FinalGFR Non-                      Date: 03/01/2021Value: >60.0       Ref range: >60                Status: Final              Comment: >60 mL/min/1.73m2 EGFR, calc. for ages 25 and older using theMDRD formula (not corrected for weight), is valid for stablerenal function. GFR                           Date: 03/01/2021Value: >60.0       Ref range: >60                Status: Final              Comment: >60 mL/min/1.73m2 EGFR, calc. for ages 25 and older using theMDRD formula (not corrected for weight), is valid for stablerenal function. Calcium                                       Date: 03/01/2021Value: 8.4*        Ref range: 8.5 - 9.9 mg/dL    Status: FinalSodium                                        Date: 03/02/2021Value: 131*        Ref range: 135 - 144 mEq/L    Status: FinalPotassium                                     Date: 03/02/2021Value: 3.3*        Ref range: 3.4 - 4.9 mEq/L    Status: FinalChloride                                      Date: 03/02/2021Value: 98          Ref range: 95 - 107 mEq/L     Status: FinalCO2                                           Date: 03/02/2021Value: 23          Ref range: 20 - 31 mEq/L      Status: FinalAnion Gap                                     Date: 03/02/2021Value: 10          Ref range: 9 - 15 mEq/L       Status: FinalGlucose                                       Date: 03/02/2021Value: 101*        Ref range: 70 - 99 mg/dL      Status: FinalBUN                                           Date: 03/02/2021Value: 6*          Ref range: 8 - 23 mg/dL       Status: FinalCREATININE                                    Date: 03/02/2021Value: 0.32*       Ref range: 0.50 - 0.90 mg/dL  Status: FinalGFR Non-                      Date: 03/02/2021Value: >60.0       Ref range: >60                Status: Final              Comment: >60 mL/min/1.73m2 EGFR, calc. for ages 25 and older using theMDRD formula (not corrected for weight), is valid for stablerenal function. GFR                           Date: 03/02/2021Value: >60.0       Ref range: >60                Status: Final Comment: >60 mL/min/1.73m2 EGFR, calc. for ages 25 and older using theMDRD formula (not corrected for weight), is valid for stablerenal function. Calcium                                       Date: 03/02/2021Value: 8.5         Ref range: 8.5 - 9.9 mg/dL    Status: FinalTotal Protein                                 Date: 03/02/2021Value: 5.9*        Ref range: 6.3 - 8.0 g/dL     Status: FinalAlbumin                                       Date: 03/02/2021Value: 3.1*        Ref range: 3.5 - 4.6 g/dL     Status: FinalTotal Bilirubin                               Date: 03/02/2021Value: 0.3         Ref range: 0.2 - 0.7 mg/dL    Status: FinalAlkaline Phosphatase                          Date: 03/02/2021Value: 70          Ref range: 40 - 130 U/L       Status: FinalALT                                           Date: 03/02/2021Value: 20          Ref range: 0 - 33 U/L         Status: FinalAST                                           Date: 03/02/2021Value: 26          Ref range: 0 - 35 U/L         Status: FinalGlobulin                                      Date: 03/02/2021Value: 2.8         Ref range: 2.3 - 3.5 g/dL     Status: Final------------    Radiology last 7 days:  Xr Foot Left (min 3 Views)Result Date: 3/1/2021                                                                                No acute osseous abnormality. Ct Head Wo ContrastResult Date: 3/1/2021Impression: Mild cerebral atrophy. Chronic ischemic white matter disease. All CT scans at this facility use dose modulation, iterative reconstruction, and/or weight based dosing when appropriate to reduce radiation dose to as low as reasonably achievable. Cta Chest W Wo ContrastResult Date: 3/1/2021No CT evidence pulmonary embolism. Emphysema. All CT scans at this facility use dose modulation, iterative reconstruction, and/or weight based dosing when appropriate to reduce radiation dose to as low as reasonably achievable.  Ct Abdomen Pelvis W Iv ContrastResult Date: 3/1/2021Circumferential urinary bladder wall thickening is concerning for cystitis for which correlation is recommended. Additional stable chronic findings as detailed. All CT scans at this facility use dose modulation, iterative reconstruction, and/or weight based dosing when appropriate to reduce radiation dose to as low as reasonably achievable. Pending Labs   Order Current Status  CANCER ANTIGEN 19-9 In process  Culture, Blood 1 Preliminary result  Culture, Blood 2 Preliminary result      Discharge Medications    Current Discharge Medication ListSTART taking these medicationscephALEXin (KEFLEX) 500 MG capsuleTake 1 capsule by mouth 3 times dailyQty: 15 capsule Refills: 0    Current Discharge Medication List    Current Discharge Medication ListCONTINUE these medications which have NOT CHANGEDVitamin D (CHOLECALCIFEROL) 50 MCG (2000 UT) TABS tabletTake 1 tablet by mouth dailyQty: 120 tablet Refills: 0Comments: Labeling may look different. 25 mcg=1000 Units. Please double check dosages. potassium chloride (KLOR-CON M) 20 MEQ extended release tabletTake 1 tablet by mouth dailyQty: 30 tablet Refills: 0amLODIPine (NORVASC) 2.5 MG tabletTake 2.5 mg by mouth dailyammonium lactate (LAC-HYDRIN) 12 % lotionApply topically as needed for Dry Skin Apply topically as needed. Current Discharge Medication List    Time Spent on Discharge:E] minutes were spent in patient examination, evaluation, counseling as well as medication reconciliation, prescriptions for required medications, discharge plan, and follow up.     Electronically signed by Bony Rahman MD on 3/2/21 at 10:51 AM EST   overtime on dc summary was 45 min

## 2021-03-02 NOTE — CARE COORDINATION
The RN and LSW received a phone call from the patient's daughter, [facility]. The patient's daughter would like the patient to go to a SNF at discharge. The LSW met with the patient. The patient states her family wanted her to go to a SNF last time and she does not want to go to a facility. The patient states the discharge plan is home with [facility]. PT is going to work with the patient and the LSW updated him about the possible need for a cane (per Virginie Peña, PT).  Electronically signed by Yao Gonzalez on 3/2/21 at 2:17 PM EST

## 2021-03-03 LAB — CA 19-9: 13 U/ML (ref 0–35)

## 2021-03-04 DIAGNOSIS — E87.1 HYPONATREMIA: ICD-10-CM

## 2021-03-05 NOTE — PROGRESS NOTES
Physical Therapy  Facility/Department: [facility] P172/Q707-30  Physical Therapy Discharge      NAME: Thiago Eagle    : 1960 (61 y.o.)  MRN: [id number]    Account: [de-identified]  Gender: female      Patient has been discharged from acute care hospital. DC patient from current PT program.      Electronically signed by Danisha Mclean PT on 3/5/21 at 12:40 PM EST

## 2021-03-06 LAB
BLOOD CULTURE, ROUTINE: NORMAL
CULTURE, BLOOD 2: NORMAL

## 2021-03-09 NOTE — PROGRESS NOTES
Physician Progress Note      PATIENT:               Gilberto Mock  CSN #:                  [id number]  :                       1960  ADMIT DATE:       2021 7:50 PM  [address] [place] DATE:        3/2/2021 3:37 PM  RESPONDING  PROVIDER #:        Lauryn MCKINNEY DPM          QUERY TEXT:    Patient admitted with hyponatremia. Per podiatric note dated 3/1/21   documentation of the sharply debrided hypererkeratotic tissue of lateral 2nd   digit and medial 3rd digit utilizing a #15 blade with no incident. To   accurately reflect the procedure performed please document if debridement was   excisional or nonexcisional and the deepest depth of tissue removed as down to   and including: The medical record reflects the following:  Risk Factors: alcohol abuse  generalized weakness repeated falls  Clinical Indicators:  3/1/21 Angelita Khan DPM PGY-1: podiatry was consulted for B/L foot pain and   LLE swelling. Sharply debrided the hyperkeratotic tissue of lateral 2nd   digit and medial 3rd digit utilizing a #15 blade with no incident. No signs of   open wounds or acute infection noted. Treatment: debridement  podiatry  Options provided:  -- Nonexcisional debridement of skin  -- Excisional debridement of skin  -- Other - I will add my own diagnosis  -- Disagree - Not applicable / Not valid  -- Disagree - Clinically unable to determine / Unknown  -- Refer to Clinical Documentation Reviewer    PROVIDER RESPONSE TEXT:    Excisional debridement of skin was performed of the hyperkeratotic tissue of   lateral 2nd digit and medial 3rd digit utilizing a #15 blade on 3/1/2021.     Query created by: Nae Tyson on 3/3/2021 8:32 AM      Electronically signed by:  Bladimir Guevara DPM 3/9/2021 8:56 AM

## 2021-03-11 ENCOUNTER — HOSPITAL ENCOUNTER (INPATIENT)
Age: 61
LOS: 3 days | Discharge: HOME | DRG: 426 | End: 2021-03-15
Attending: EMERGENCY MEDICINE | Admitting: INTERNAL MEDICINE
Payer: COMMERCIAL

## 2021-03-11 ENCOUNTER — APPOINTMENT (OUTPATIENT)
Dept: CT IMAGING | Age: 61
DRG: 426 | End: 2021-03-11
Payer: COMMERCIAL

## 2021-03-11 DIAGNOSIS — R29.6 REPEATED FALLS: ICD-10-CM

## 2021-03-11 DIAGNOSIS — S09.90XA INJURY OF HEAD, INITIAL ENCOUNTER: Primary | ICD-10-CM
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
## 2021-03-11 DIAGNOSIS — S09.90XA CLOSED HEAD INJURY, INITIAL ENCOUNTER: ICD-10-CM

## 2021-03-11 PROCEDURE — 70450 CT HEAD/BRAIN W/O DYE: CPT

## 2021-03-11 PROCEDURE — 99285 EMERGENCY DEPT VISIT HI MDM: CPT

## 2021-03-11 PROCEDURE — 72125 CT NECK SPINE W/O DYE: CPT

## 2021-03-11 ASSESSMENT — PAIN SCALES - GENERAL: PAINLEVEL_OUTOF10: 4

## 2021-03-11 ASSESSMENT — PAIN DESCRIPTION - LOCATION: LOCATION: HEAD

## 2021-03-11 ASSESSMENT — PAIN DESCRIPTION - ORIENTATION: ORIENTATION: RIGHT

## 2021-03-12 PROBLEM — Z72.0 TOBACCO USER: Status: ACTIVE | Noted: 2019-02-12

## 2021-03-12 PROBLEM — Z85.43 HISTORY OF MALIGNANT NEOPLASM OF OVARY: Status: ACTIVE | Noted: 2021-03-12

## 2021-03-12 PROBLEM — Z91.148 POOR COMPLIANCE WITH MEDICATION: Status: ACTIVE | Noted: 2021-03-12

## 2021-03-12 PROBLEM — Z91.14 POOR COMPLIANCE WITH MEDICATION: Status: ACTIVE | Noted: 2021-03-12

## 2021-03-12 PROBLEM — F03.90 DEMENTIA (HCC): Status: ACTIVE | Noted: 2021-03-12

## 2021-03-12 LAB
ALBUMIN SERPL-MCNC: 4 G/DL (ref 3.5–4.6)
ALP BLD-CCNC: 96 U/L (ref 40–130)
ALT SERPL-CCNC: 20 U/L (ref 0–33)
AMMONIA: 28 UMOL/L (ref 11–51)
ANION GAP SERPL CALCULATED.3IONS-SCNC: 10 MEQ/L (ref 9–15)
AST SERPL-CCNC: 31 U/L (ref 0–35)
BASOPHILS ABSOLUTE: 0.1 K/UL (ref 0–0.2)
BASOPHILS RELATIVE PERCENT: 0.7 %
BILIRUB SERPL-MCNC: 0.3 MG/DL (ref 0.2–0.7)
BILIRUBIN URINE: NEGATIVE
BLOOD, URINE: NEGATIVE
BUN BLDV-MCNC: 6 MG/DL (ref 8–23)
CALCIUM SERPL-MCNC: 9.2 MG/DL (ref 8.5–9.9)
CHLORIDE BLD-SCNC: 92 MEQ/L (ref 95–107)
CK MB: 19.9 NG/ML (ref 0–3.8)
CLARITY: CLEAR
CO2: 25 MEQ/L (ref 20–31)
COLOR: YELLOW
CREAT SERPL-MCNC: 0.3 MG/DL (ref 0.5–0.9)
CREATINE KINASE-MB INDEX: 5.8 % (ref 0–3.5)
EKG ATRIAL RATE: 68 BPM
EKG P AXIS: 81 DEGREES
EKG P-R INTERVAL: 152 MS
EKG Q-T INTERVAL: 444 MS
EKG QRS DURATION: 74 MS
EKG QTC CALCULATION (BAZETT): 472 MS
EKG R AXIS: 56 DEGREES
EKG T AXIS: 51 DEGREES
EKG VENTRICULAR RATE: 68 BPM
EOSINOPHILS ABSOLUTE: 0 K/UL (ref 0–0.7)
EOSINOPHILS RELATIVE PERCENT: 0.5 %
ETHANOL PERCENT: NORMAL G/DL
ETHANOL: <10 MG/DL (ref 0–0.08)
GFR AFRICAN AMERICAN: >60
GFR NON-AFRICAN AMERICAN: >60
GLOBULIN: 3.4 G/DL (ref 2.3–3.5)
GLUCOSE BLD-MCNC: 104 MG/DL (ref 60–115)
GLUCOSE BLD-MCNC: 122 MG/DL (ref 60–115)
GLUCOSE BLD-MCNC: 92 MG/DL (ref 70–99)
GLUCOSE URINE: NEGATIVE MG/DL
HCT VFR BLD CALC: 38 % (ref 37–47)
HEMOGLOBIN: 12.9 G/DL (ref 12–16)
KETONES, URINE: NEGATIVE MG/DL
LEUKOCYTE ESTERASE, URINE: NEGATIVE
LYMPHOCYTES ABSOLUTE: 1.7 K/UL (ref 1–4.8)
LYMPHOCYTES RELATIVE PERCENT: 18.3 %
MCH RBC QN AUTO: 32.6 PG (ref 27–31.3)
MCHC RBC AUTO-ENTMCNC: 34 % (ref 33–37)
MCV RBC AUTO: 95.9 FL (ref 82–100)
MONOCYTES ABSOLUTE: 1 K/UL (ref 0.2–0.8)
MONOCYTES RELATIVE PERCENT: 10.4 %
NEUTROPHILS ABSOLUTE: 6.6 K/UL (ref 1.4–6.5)
NEUTROPHILS RELATIVE PERCENT: 70.1 %
NITRITE, URINE: NEGATIVE
PDW BLD-RTO: 13 % (ref 11.5–14.5)
PERFORMED ON: ABNORMAL
PERFORMED ON: NORMAL
PH UA: 7 (ref 5–9)
PLATELET # BLD: 458 K/UL (ref 130–400)
POTASSIUM SERPL-SCNC: 3.9 MEQ/L (ref 3.4–4.9)
PROTEIN UA: NEGATIVE MG/DL
RBC # BLD: 3.96 M/UL (ref 4.2–5.4)
SARS-COV-2, NAAT: NOT DETECTED
SODIUM BLD-SCNC: 127 MEQ/L (ref 135–144)
SPECIFIC GRAVITY UA: 1.01 (ref 1–1.03)
TOTAL CK: 344 U/L (ref 0–170)
TOTAL PROTEIN: 7.4 G/DL (ref 6.3–8)
TSH SERPL DL<=0.05 MIU/L-ACNC: 2.56 UIU/ML (ref 0.44–3.86)
URINE REFLEX TO CULTURE: NORMAL
UROBILINOGEN, URINE: 0.2 E.U./DL
WBC # BLD: 9.4 K/UL (ref 4.8–10.8)

## 2021-03-12 PROCEDURE — G0378 HOSPITAL OBSERVATION PER HR: HCPCS

## 2021-03-12 PROCEDURE — 82550 ASSAY OF CK (CPK): CPT

## 2021-03-12 PROCEDURE — 82077 ASSAY SPEC XCP UR&BREATH IA: CPT

## 2021-03-12 PROCEDURE — 93005 ELECTROCARDIOGRAM TRACING: CPT | Performed by: EMERGENCY MEDICINE

## 2021-03-12 PROCEDURE — 81003 URINALYSIS AUTO W/O SCOPE: CPT

## 2021-03-12 PROCEDURE — 87635 SARS-COV-2 COVID-19 AMP PRB: CPT

## 2021-03-12 PROCEDURE — 36415 COLL VENOUS BLD VENIPUNCTURE: CPT

## 2021-03-12 PROCEDURE — 2580000003 HC RX 258: Performed by: INTERNAL MEDICINE

## 2021-03-12 PROCEDURE — 6370000000 HC RX 637 (ALT 250 FOR IP): Performed by: INTERNAL MEDICINE

## 2021-03-12 PROCEDURE — 82140 ASSAY OF AMMONIA: CPT

## 2021-03-12 PROCEDURE — 93010 ELECTROCARDIOGRAM REPORT: CPT | Performed by: INTERNAL MEDICINE

## 2021-03-12 PROCEDURE — 80053 COMPREHEN METABOLIC PANEL: CPT

## 2021-03-12 PROCEDURE — 1210000000 HC MED SURG R&B

## 2021-03-12 PROCEDURE — 82553 CREATINE MB FRACTION: CPT

## 2021-03-12 PROCEDURE — 85025 COMPLETE CBC W/AUTO DIFF WBC: CPT

## 2021-03-12 PROCEDURE — 84443 ASSAY THYROID STIM HORMONE: CPT

## 2021-03-12 RX ORDER — ACETAMINOPHEN 650 MG/1
650 SUPPOSITORY RECTAL EVERY 6 HOURS PRN
Status: DISCONTINUED | OUTPATIENT
Start: 2021-03-12 | End: 2021-03-15 | Stop reason: HOSPADM

## 2021-03-12 RX ORDER — SODIUM CHLORIDE 0.9 % (FLUSH) 0.9 %
10 SYRINGE (ML) INJECTION EVERY 12 HOURS SCHEDULED
Status: DISCONTINUED | OUTPATIENT
Start: 2021-03-12 | End: 2021-03-15 | Stop reason: HOSPADM

## 2021-03-12 RX ORDER — ACETAMINOPHEN 325 MG/1
650 TABLET ORAL EVERY 6 HOURS PRN
Status: DISCONTINUED | OUTPATIENT
Start: 2021-03-12 | End: 2021-03-15 | Stop reason: HOSPADM

## 2021-03-12 RX ORDER — ONDANSETRON 2 MG/ML
4 INJECTION INTRAMUSCULAR; INTRAVENOUS EVERY 6 HOURS PRN
Status: DISCONTINUED | OUTPATIENT
Start: 2021-03-12 | End: 2021-03-15 | Stop reason: HOSPADM

## 2021-03-12 RX ORDER — DEXTROSE MONOHYDRATE 50 MG/ML
100 INJECTION, SOLUTION INTRAVENOUS PRN
Status: DISCONTINUED | OUTPATIENT
Start: 2021-03-12 | End: 2021-03-15 | Stop reason: HOSPADM

## 2021-03-12 RX ORDER — NICOTINE POLACRILEX 4 MG
15 LOZENGE BUCCAL PRN
Status: DISCONTINUED | OUTPATIENT
Start: 2021-03-12 | End: 2021-03-15 | Stop reason: HOSPADM

## 2021-03-12 RX ORDER — DEXTROSE MONOHYDRATE 25 G/50ML
12.5 INJECTION, SOLUTION INTRAVENOUS PRN
Status: DISCONTINUED | OUTPATIENT
Start: 2021-03-12 | End: 2021-03-15 | Stop reason: HOSPADM

## 2021-03-12 RX ORDER — POLYETHYLENE GLYCOL 3350 17 G/17G
17 POWDER, FOR SOLUTION ORAL DAILY PRN
Status: DISCONTINUED | OUTPATIENT
Start: 2021-03-12 | End: 2021-03-15 | Stop reason: HOSPADM

## 2021-03-12 RX ORDER — SODIUM CHLORIDE 0.9 % (FLUSH) 0.9 %
10 SYRINGE (ML) INJECTION PRN
Status: DISCONTINUED | OUTPATIENT
Start: 2021-03-12 | End: 2021-03-15 | Stop reason: HOSPADM

## 2021-03-12 RX ORDER — AMLODIPINE BESYLATE 5 MG/1
2.5 TABLET ORAL DAILY
Status: DISCONTINUED | OUTPATIENT
Start: 2021-03-12 | End: 2021-03-13

## 2021-03-12 RX ORDER — PROMETHAZINE HYDROCHLORIDE 12.5 MG/1
12.5 TABLET ORAL EVERY 6 HOURS PRN
Status: DISCONTINUED | OUTPATIENT
Start: 2021-03-12 | End: 2021-03-15 | Stop reason: HOSPADM

## 2021-03-12 RX ADMIN — SODIUM CHLORIDE, PRESERVATIVE FREE 10 ML: 5 INJECTION INTRAVENOUS at 21:04

## 2021-03-12 RX ADMIN — AMLODIPINE BESYLATE 2.5 MG: 5 TABLET ORAL at 21:04

## 2021-03-12 ASSESSMENT — ENCOUNTER SYMPTOMS
NAUSEA: 0
COUGH: 0
EYE DISCHARGE: 0
SHORTNESS OF BREATH: 0
CHEST TIGHTNESS: 0
ABDOMINAL PAIN: 0
DIARRHEA: 0
SORE THROAT: 0
WHEEZING: 0
PHOTOPHOBIA: 0
ABDOMINAL DISTENTION: 0
VOMITING: 0

## 2021-03-12 NOTE — ED NOTES
Patient to bathroom with steady gait. No distress noted. Unable to contact son. Son not answering. Patient stated she has no other way to get home.       Merary Villarreal RN  03/12/21 4168

## 2021-03-12 NOTE — ED NOTES
Patient cooperative with assessment. Denies depression, denies SI, denies HI and denies A/V hallucinations. Patient has slow thought process and thought blocking at times. Mild confusion noted. Patient is a poor historian. Denies any previous psychiatric history. States she lives with her boyfriend for the past 25 years who shares a 21year old son with. Patient has a total of 6 children aged 21 to 45. Patient reports that she had been a daily ETOH drinker and has recently decreased her consumption after a recent admit to the hospital when told she was at risk for Liver involvement due to her drinking. Admits to drinking 2-3 times a week since her discharge. Patient minimizes events leading to her ED admit and is unable to state why she has been falling more recently other than she has many \"health problems. \" Patient states she does not have a support system at home and tries not to bother her boyfriend and children. Admits she feels people have a tendency to talk down to her and not show respect. Denies any abuse at this time. Offered a list of behavioral health providers and patient states \"I would like that. \" Patient also offered to speak with [facility] and declined at this time. List of mental health providers given to patient.      Quoc May RN  03/12/21 8335

## 2021-03-12 NOTE — FLOWSHEET NOTE
1348     Admission nursing  assessment completed. Pt :               Alert and oriented. anxious  RESP:      Even and non labored         Lung sounds:         Clear. diminished    Oxygen: room air  Complaints of:    Pain: ble chronic pain  IV: sl  TELE: no tele  Dressings:   Precautions:              Falls:        Shon:   Chart and meds reviewed. Noted Labs:   Plan for today:  Admission in progress. Safety from falls. Avasys in room. Resp even and non labored. .esgin    1634 ref lovenox shot. Pt insists a med from Dr Howie Dandy at [facility]. No RX at pharmacy. Pt restless and anxious.  Electronically signed by Harinder Garcia RN on 3/12/2021 at 4:35 PM
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
## 2021-03-12 NOTE — ED NOTES
Message left for son Jessica to arrange for transportation home.  Pt is not a candidate for inpatient behavioral health care     Jennifer Pike RN  03/12/21 2763
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
## 2021-03-12 NOTE — ED NOTES
Patient resting quietly respirations are even and unlabored no distress noted at this time.      Facundo Locke RN  03/12/21 4458

## 2021-03-12 NOTE — ED NOTES
Message left for son Lolis Richmond to arrange transportation home.       Melisa Deal RN  03/12/21 0700

## 2021-03-12 NOTE — H&P
Hospital Medicine  History and Physical    Patient:  Hasmukh Reina  MRN: [id number]    CHIEF COMPLAINT:    Chief Complaint   Patient presents with    Fall       History Obtained From:  Patient, EMR  Primary Care Physician: Kb Macias MD    HISTORY OF PRESENT ILLNESS:   The patient is a 61 y.o. female who presents with frequent falls. Duration of symptoms: Days. Timing: Intermittent. Patient was in her usual state of health when she fell, hit her head. She states this is happened to her in the past and she has lost teeth in the process. No aggravating relieving factors. Not associate with fever. Past Medical History:      Diagnosis Date    Cancer ([facility] [address]     [facility]     ETOH abuse        Past Surgical History:  History reviewed. No pertinent surgical history. Medications Prior to Admission:    Prior to Admission medications    Medication Sig Start Date End Date Taking? Authorizing Provider   cephALEXin (KEFLEX) 500 MG capsule Take 1 capsule by mouth 3 times daily 3/2/21  Yes Karlene Cheney MD   Vitamin D (CHOLECALCIFEROL) 50 MCG (2000 UT) TABS tablet Take 1 tablet by mouth daily 2/10/21  Yes Chi Meza, DO   potassium chloride (KLOR-CON M) 20 MEQ extended release tablet Take 1 tablet by mouth daily 2/10/21  Yes Corby Sterling, DO   amLODIPine (NORVASC) 2.5 MG tablet Take 2.5 mg by mouth daily   Yes Historical Provider, MD   ammonium lactate (LAC-HYDRIN) 12 % lotion Apply topically as needed for Dry Skin Apply topically as needed. Yes Historical Provider, MD       Allergies:  Zantac [ranitidine hcl]    Social History:   TOBACCO:   reports that she has been smoking. She has never used smokeless tobacco.  ETOH:   reports current alcohol use. Family History:   History reviewed. No pertinent family history.     REVIEW OF SYSTEMS:  Ten systems reviewed and negative except as stated in the HPI    Physical Exam:    Vitals: /71   Pulse 60   Temp 98.8 °F (37.1 °C) (Oral) Resp 20   Ht 5' (1.524 m)   Wt 82 lb (37.2 kg)   LMP  (LMP Unknown)   SpO2 100%   BMI 16.01 kg/m²   General appearance: alert, appears stated age and cooperative  Skin: Skin color, texture, turgor normal. No rashes or lesions  HEENT: Head: Normocephalic, no lesions, without obvious abnormality. Few missing teeth noted   neck: no jugular venous distention  Lungs: Normal effort of breathing, few scattered crackles  Heart: regular rate and rhythm, S1, S2 normal, no murmur, click, rub or gallop  Abdomen: soft, non-tender; bowel sounds normal; no masses,  no organomegaly  Extremities: extremities normal, atraumatic, no cyanosis or edema  Neurologic: Mental status: Alert, oriented, thought content appropriate. Recent Labs     03/12/21 0215   WBC 9.4   HGB 12.9   *     Recent Labs     03/12/21 0215   *   K 3.9   CL 92*   CO2 25   BUN 6*   CREATININE 0.30*   GLUCOSE 92   AST 31   ALT 20   BILITOT 0.3   ALKPHOS 96     Troponin T: No results for input(s): TROPONINI in the last 72 hours. ABGs: No results found for: PHART, PO2ART, OPA0NUJ  INR: No results for input(s): INR in the last 72 hours. URINALYSIS:  Recent Labs     03/12/21 0215   COLORU Yellow   PHUR 7.0   CLARITYU Clear   SPECGRAV 1.006   LEUKOCYTESUR Negative   UROBILINOGEN 0.2   BILIRUBINUR Negative   BLOODU Negative   GLUCOSEU Negative     -----------------------------------------------------------------   Ct Head Wo Contrast    Result Date: 3/12/2021  CT HEAD WO CONTRAST CLINICAL HISTORY:  fall Comparison: #[id number] TECHNIQUE: Multiple unenhanced serial axial images of the brain from the vertex of the skull to the base of the skull were performed. FINDINGS: The ventricles are dilated. Unchanged size configuration. .  No mass. No midline shift. The cisterns are patent. There are white matter and periventricular changes most likely consistent with chronic small vessel ischemic disease.  The visualized osseous structures are unremarkable. The visualized portion of the paranasal sinuses, and mastoids are unremarkable. There is an area of soft tissue swelling hematoma overlying the right posterior parietal occipital region. Correlate with physical exam IMPRESSION NO ACUTE INTRA-AXIAL OR EXTRA-AXIAL FINDINGS. All CT scans at this facility use dose modulation, iterative reconstruction, and/or weight based dosing when appropriate to reduce radiation dose to as low as reasonably achievable. Ct Cervical Spine Wo Contrast    Result Date: 3/12/2021  CT cervical spine without intravenous contrast medium. HISTORY: Fall. Hit posterior head. Confusion. Altered mental status. TECHNICAL FACTORS: CT cervical spine obtained and formatted as 2.5 mm contiguous axial images from skull base to the level of. Sagittal and coronal reconstructions were obtained during postprocessing. No contrast medium was utilized. COMPARISON: CT cervical spine, December 13, 2019 FINDINGS: Cervical vertebral bodies are normal in height and alignment Atlantooccipital articulation maintained. Atlantoaxial interval preserved. No foraminal narrowing, right C3-4, bilateral C4-5, bilateral C5-6. Diffuse disc space narrowing C3-4, C5-6. Posterior osteophytes C4 and C5. No fractures, dislocations, bone lesions. Limited imaging lung apices without anomaly. Carotid arteries and soft tissues are without anomaly. No fracture. Moderate to marked degenerative change cervical spine. All CT scans at this facility use dose modulation, iterative reconstruction, and/or weight based dosing when appropriate to reduce radiation dose to as low as reasonably achievable. Assessment and Plan   1. Hyponatremia: Continue to monitor. Likely secondary to poor oral intake. Clinically concerning for severe malnutrition. 2. Frequent falls: PT/OT evaluation. May benefit from rehab.  3. History of ovarian cancer noted. 4. History of hypertension: Currently normotensive.   Continue to monitor blood pressure. 5. DVT ppx  6. Disposition: Dependent on hospital course. Will discharge once medically stable. SW on board for discharge planning.      Patient Active Problem List   Diagnosis Code    Hyponatremia E87.1    HTN (hypertension) I10    Hypokalemia E87.6    Alcohol use Z72.89    Elevated liver enzymes R74.8    Encephalopathy acute Q08.59    Metabolic encephalopathy T77.31    Repeated falls R29.6    Hypomagnesemia E83.42    Severe malnutrition (HCC) E43    Poor compliance with medication Z91.14    History of malignant neoplasm of ovary Z85.43    Tobacco user Z72.0    Dementia (HCC) F03.90       Hanh Giraldo MD

## 2021-03-13 LAB
ANION GAP SERPL CALCULATED.3IONS-SCNC: 8 MEQ/L (ref 9–15)
BUN BLDV-MCNC: 6 MG/DL (ref 8–23)
CALCIUM SERPL-MCNC: 8.9 MG/DL (ref 8.5–9.9)
CHLORIDE BLD-SCNC: 96 MEQ/L (ref 95–107)
CHLORIDE URINE RANDOM: 80 MEQ/L
CO2: 26 MEQ/L (ref 20–31)
CREAT SERPL-MCNC: 0.5 MG/DL (ref 0.5–0.9)
CREATININE URINE: 34.1 MG/DL
FOLATE: 10.8 NG/ML (ref 7.3–26.1)
GFR AFRICAN AMERICAN: >60
GFR NON-AFRICAN AMERICAN: >60
GLUCOSE BLD-MCNC: 102 MG/DL (ref 60–115)
GLUCOSE BLD-MCNC: 110 MG/DL (ref 60–115)
GLUCOSE BLD-MCNC: 82 MG/DL (ref 60–115)
GLUCOSE BLD-MCNC: 87 MG/DL (ref 60–115)
GLUCOSE BLD-MCNC: 95 MG/DL (ref 70–99)
OSMOLALITY URINE: 372 MOSM/KG (ref 300–900)
PERFORMED ON: NORMAL
POTASSIUM SERPL-SCNC: 3.5 MEQ/L (ref 3.4–4.9)
POTASSIUM, UR: 17.2 MEQ/L
SODIUM BLD-SCNC: 130 MEQ/L (ref 135–144)
SODIUM URINE: 124 MEQ/L
VITAMIN B-12: 211 PG/ML (ref 232–1245)

## 2021-03-13 PROCEDURE — 1210000000 HC MED SURG R&B

## 2021-03-13 PROCEDURE — 84133 ASSAY OF URINE POTASSIUM: CPT

## 2021-03-13 PROCEDURE — 82570 ASSAY OF URINE CREATININE: CPT

## 2021-03-13 PROCEDURE — 36415 COLL VENOUS BLD VENIPUNCTURE: CPT

## 2021-03-13 PROCEDURE — 2580000003 HC RX 258: Performed by: INTERNAL MEDICINE

## 2021-03-13 PROCEDURE — 6360000002 HC RX W HCPCS: Performed by: INTERNAL MEDICINE

## 2021-03-13 PROCEDURE — 82436 ASSAY OF URINE CHLORIDE: CPT

## 2021-03-13 PROCEDURE — 84300 ASSAY OF URINE SODIUM: CPT

## 2021-03-13 PROCEDURE — 97165 OT EVAL LOW COMPLEX 30 MIN: CPT

## 2021-03-13 PROCEDURE — 97161 PT EVAL LOW COMPLEX 20 MIN: CPT

## 2021-03-13 PROCEDURE — 80048 BASIC METABOLIC PNL TOTAL CA: CPT

## 2021-03-13 PROCEDURE — 6370000000 HC RX 637 (ALT 250 FOR IP): Performed by: INTERNAL MEDICINE

## 2021-03-13 PROCEDURE — 82607 VITAMIN B-12: CPT

## 2021-03-13 PROCEDURE — 99221 1ST HOSP IP/OBS SF/LOW 40: CPT | Performed by: PHYSICAL MEDICINE & REHABILITATION

## 2021-03-13 PROCEDURE — G0378 HOSPITAL OBSERVATION PER HR: HCPCS

## 2021-03-13 PROCEDURE — 82746 ASSAY OF FOLIC ACID SERUM: CPT

## 2021-03-13 PROCEDURE — 83935 ASSAY OF URINE OSMOLALITY: CPT

## 2021-03-13 RX ORDER — LORAZEPAM 1 MG/1
3 TABLET ORAL
Status: DISCONTINUED | OUTPATIENT
Start: 2021-03-13 | End: 2021-03-15 | Stop reason: HOSPADM

## 2021-03-13 RX ORDER — LORAZEPAM 1 MG/1
2 TABLET ORAL
Status: DISCONTINUED | OUTPATIENT
Start: 2021-03-13 | End: 2021-03-15 | Stop reason: HOSPADM

## 2021-03-13 RX ORDER — LORAZEPAM 2 MG/ML
4 INJECTION INTRAMUSCULAR
Status: DISCONTINUED | OUTPATIENT
Start: 2021-03-13 | End: 2021-03-15 | Stop reason: HOSPADM

## 2021-03-13 RX ORDER — LANOLIN ALCOHOL/MO/W.PET/CERES
100 CREAM (GRAM) TOPICAL DAILY
Status: DISCONTINUED | OUTPATIENT
Start: 2021-03-13 | End: 2021-03-15 | Stop reason: HOSPADM

## 2021-03-13 RX ORDER — LORAZEPAM 2 MG/ML
3 INJECTION INTRAMUSCULAR
Status: DISCONTINUED | OUTPATIENT
Start: 2021-03-13 | End: 2021-03-15 | Stop reason: HOSPADM

## 2021-03-13 RX ORDER — LORAZEPAM 1 MG/1
4 TABLET ORAL
Status: DISCONTINUED | OUTPATIENT
Start: 2021-03-13 | End: 2021-03-15 | Stop reason: HOSPADM

## 2021-03-13 RX ORDER — LORAZEPAM 2 MG/ML
2 INJECTION INTRAMUSCULAR
Status: DISCONTINUED | OUTPATIENT
Start: 2021-03-13 | End: 2021-03-15 | Stop reason: HOSPADM

## 2021-03-13 RX ORDER — LORAZEPAM 2 MG/ML
1 INJECTION INTRAMUSCULAR
Status: DISCONTINUED | OUTPATIENT
Start: 2021-03-13 | End: 2021-03-15 | Stop reason: HOSPADM

## 2021-03-13 RX ORDER — LORAZEPAM 1 MG/1
1 TABLET ORAL
Status: DISCONTINUED | OUTPATIENT
Start: 2021-03-13 | End: 2021-03-15 | Stop reason: HOSPADM

## 2021-03-13 RX ORDER — FOLIC ACID 1 MG/1
1 TABLET ORAL DAILY
Status: DISCONTINUED | OUTPATIENT
Start: 2021-03-13 | End: 2021-03-15 | Stop reason: HOSPADM

## 2021-03-13 RX ORDER — AMLODIPINE BESYLATE 5 MG/1
5 TABLET ORAL DAILY
Status: DISCONTINUED | OUTPATIENT
Start: 2021-03-14 | End: 2021-03-15 | Stop reason: HOSPADM

## 2021-03-13 RX ORDER — SODIUM CHLORIDE 0.9 % (FLUSH) 0.9 %
10 SYRINGE (ML) INJECTION PRN
Status: DISCONTINUED | OUTPATIENT
Start: 2021-03-13 | End: 2021-03-15 | Stop reason: HOSPADM

## 2021-03-13 RX ORDER — SODIUM CHLORIDE 0.9 % (FLUSH) 0.9 %
10 SYRINGE (ML) INJECTION EVERY 12 HOURS SCHEDULED
Status: DISCONTINUED | OUTPATIENT
Start: 2021-03-13 | End: 2021-03-15 | Stop reason: HOSPADM

## 2021-03-13 RX ADMIN — AMLODIPINE BESYLATE 2.5 MG: 5 TABLET ORAL at 08:37

## 2021-03-13 RX ADMIN — SODIUM CHLORIDE, PRESERVATIVE FREE 10 ML: 5 INJECTION INTRAVENOUS at 08:38

## 2021-03-13 RX ADMIN — Medication 100 MG: at 17:25

## 2021-03-13 RX ADMIN — FOLIC ACID 1 MG: 1 TABLET ORAL at 17:25

## 2021-03-13 RX ADMIN — SODIUM CHLORIDE, PRESERVATIVE FREE 10 ML: 5 INJECTION INTRAVENOUS at 20:48

## 2021-03-13 SDOH — ECONOMIC STABILITY: INCOME INSECURITY: HOW HARD IS IT FOR YOU TO PAY FOR THE VERY BASICS LIKE FOOD, HOUSING, MEDICAL CARE, AND HEATING?: SOMEWHAT HARD

## 2021-03-13 SDOH — SOCIAL STABILITY: SOCIAL INSECURITY: WITHIN THE LAST YEAR, HAVE YOU BEEN HUMILIATED OR EMOTIONALLY ABUSED IN OTHER WAYS BY YOUR PARTNER OR EX-PARTNER?: NO

## 2021-03-13 SDOH — HEALTH STABILITY: PHYSICAL HEALTH: ON AVERAGE, HOW MANY MINUTES DO YOU ENGAGE IN EXERCISE AT THIS LEVEL?: 0 MIN

## 2021-03-13 SDOH — HEALTH STABILITY: MENTAL HEALTH
STRESS IS WHEN SOMEONE FEELS TENSE, NERVOUS, ANXIOUS, OR CAN'T SLEEP AT NIGHT BECAUSE THEIR MIND IS TROUBLED. HOW STRESSED ARE YOU?: VERY MUCH

## 2021-03-13 SDOH — ECONOMIC STABILITY: FOOD INSECURITY: WITHIN THE PAST 12 MONTHS, YOU WORRIED THAT YOUR FOOD WOULD RUN OUT BEFORE YOU GOT MONEY TO BUY MORE.: SOMETIMES TRUE

## 2021-03-13 SDOH — SOCIAL STABILITY: SOCIAL NETWORK: IN A TYPICAL WEEK, HOW MANY TIMES DO YOU TALK ON THE PHONE WITH FAMILY, FRIENDS, OR NEIGHBORS?: ONCE A WEEK

## 2021-03-13 SDOH — SOCIAL STABILITY: SOCIAL NETWORK: HOW OFTEN DO YOU ATTEND CHURCH OR RELIGIOUS SERVICES?: NEVER

## 2021-03-13 ASSESSMENT — ENCOUNTER SYMPTOMS
COUGH: 0
SORE THROAT: 0
BLOOD IN STOOL: 0
EYE REDNESS: 0
NAUSEA: 0
PHOTOPHOBIA: 0
BACK PAIN: 1
STRIDOR: 0
VOMITING: 0
EYE PAIN: 0
SHORTNESS OF BREATH: 1
ABDOMINAL PAIN: 0
EYE DISCHARGE: 0
DIARRHEA: 0
CONSTIPATION: 0
WHEEZING: 0

## 2021-03-13 ASSESSMENT — PAIN SCALES - GENERAL
PAINLEVEL_OUTOF10: 0
PAINLEVEL_OUTOF10: 0

## 2021-03-13 NOTE — PROGRESS NOTES
[facility] Occupational Therapy      Date: 3/13/2021  Patient Name: Susan Conley        MRN: [id number]  Account: [de-identified]   : 1960  (61 y.o.)  Room: [place] [address]    Pt. is of observation status. To comply with medicare and insurance regulations, to see patient we require updated orders including a valid OT treatment diagnosis. Please reorder as appropriate.      Electronically signed by BROCK Mar on 3/13/2021 at 7:41 AM
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
## 2021-03-13 NOTE — PROGRESS NOTES
Physical Therapy Med Surg Initial Assessment  Facility/Department: [facility]  [facility]       NAME: Brian Torres  : 1960 (61 y.o.)  MRN: [id number]  CODE STATUS: Full Code    Date of Service: 3/13/2021    Patient Diagnosis(es): Hyponatremia [E87.1]   Chief Complaint   Patient presents with    Fall     Patient Active Problem List    Diagnosis Date Noted    Poor compliance with medication 2021    History of malignant neoplasm of ovary 2021    D[facility] ([facility] 2021    Severe malnutrition ([facility] 2021    Repeated falls 2021    Hypomagnesemia     Metabolic encephalopathy     Encephalopathy acute     HTN (hypertension) 2021    Hypokalemia 2021    Alcohol use 2021    Elevated liver enzymes 2021    Hyponatremia 2021    Tobacco user 2019        Past Medical History:   Diagnosis Date    Cancer ([facility]     Cervical cancer ([facility]     ETOH abuse      History reviewed. No pertinent surgical history.     Chart Reviewed: Yes  Patient assessed for rehabilitation services?: Yes  Family / Caregiver Present: No  General Comment  Comments: Pt laying in bed, agreeable to PT eval    Restrictions:  Restrictions/Precautions: Fall Risk     SUBJECTIVE: Subjective: Pt denies pain currently  Response To Previous Treatment: Not applicable    Pain  Pre Treatment Pain Screening  Pain at present: 0  Scale Used: Numeric Score    Post Treatment Pain Screening:   Pain Screening  Patient Currently in Pain: No  Pain Assessment  Pain Assessment: 0-10  Pain Level: 0    Prior Level of Function:  Social/Functional History  Lives With: Daughter  Type of Home: House  Home Layout: Two level, Bed/Bath upstairs  Home Access: Stairs to enter with rails  Entrance Stairs - Number of Steps: 1+1  Entrance Stairs - Rails: Both  Bathroom Shower/Tub: Tub/Shower unit  ADL Assistance: Independent  Homemaking Assistance: Independent  Education: boy friend and dtr drive    OBJECTIVE:   Vision: Impaired  Vision Exceptions: Wears glasses at all times(pt stated lost glasses)  Hearing: Within functional limits    Cognition:  Overall Orientation Status: Within Functional Limits  Follows Commands: Within Functional Limits    Observation/Palpation  Observation: pleasant, cooperative, talkative, requires redirection    ROM:  RLE AROM: WFL  LLE AROM : WFL    Strength:  Strength RLE  Strength RLE: WFL  Strength LLE  Strength LLE: WFL    Neuro:  Balance  Posture: Good  Sitting - Static: Good  Sitting - Dynamic: Good  Standing - Static: Good  Standing - Dynamic: Good     Tone RLE  RLE Tone: Normotonic  Tone LLE  LLE Tone: Normotonic  Motor Control  Gross Motor?: WFL  Sensation  Overall Sensation Status: Impaired(Pt reported tingling in B hands > and B feet \"every so often\")    Bed mobility  Supine to Sit: Modified independent  Sit to Supine: Modified independent    Transfers  Sit to Stand: Supervision  Stand to sit: Supervision  Bed to Chair: Supervision    Ambulation  Ambulation?: Yes  Ambulation 1  Device: No Device  Assistance: Supervision  Quality of Gait: L foot with decreased DF in terminal swing  Distance: 80ft  Comments: with multiple turns    Activity Tolerance  Activity Tolerance: Patient Tolerated treatment well  ASSESSMENT:   Decision Making: Low Complexity  History: low  Exam: low  Clinical Presentation: low  No Skilled PT: At baseline function    Prognosis: Good  Barriers to Learning: none    DISCHARGE RECOMMENDATIONS:  No Skilled PT: At baseline function    Assessment: Pt is indep/supervision with functional mobility.  Pt demonstrates safe functional mobility for home going with family support  REQUIRES PT FOLLOW UP: No      PLAN OF CARE:  Plan  Times per week: eval only  Safety Devices  Type of devices: Left in bed, Call light within reach, Nurse notified(C3 at bedside upon departure)    [facility] (6 CLICK) [address] Mobility Raw Score : 20     Therapy Time:   Individual   Time In 1306   Time Out 1320   Minutes [address] [place] [address], 03/13/21 at 1:28 PM         Definitions for assistance levels  Independent = pt does not require any physical supervision or assistance from another person for activity completion. Device may be needed.   Stand by assistance = pt requires verbal cues or instructions from another person, close to but not touching, to perform the activity  Minimal assistance= pt performs 75% or more of the activity; assistance is required to complete the activity  Moderate assistance= pt performs 50% of the activity; assistance is required to complete the activity  Maximal assistance = pt performs 25% of the activity; assistance is required to complete the activity  Dependent = pt requires total physical assistance to accomplish the task

## 2021-03-13 NOTE — CONSULTS
Physical Medicine & Rehabilitation  Consult Note      Admitting Physician: Estela Juárez MD    Primary Care Provider: Rose Howe MD     Reason for Consult:  Asses rehab needs, promote physical and mental function, and decrease likelihood of re-admit to the hospital after discharge. History of Present Illness:    Mathew Emerson is a 61 y.o. female admitted to [facility] on 3/11/2021. Patient was recently admitted through the emergency room with decline in functional status. Despite her relatively young age apparently she has had a decline in her cognitive status and has mild cerebral atrophy on CT brain. Fatigue  This is a recurrent problem. The current episode started in the past 7 days. The problem occurs constantly. The problem has been gradually improving. Associated symptoms include fatigue, myalgias and weakness. Pertinent negatives include no abdominal pain, chest pain, chills, congestion, coughing, diaphoresis, fever, headaches, nausea, neck pain, rash, sore throat or vomiting. I reviewed recent nursing notes discussed care with acute care providers, \"    Admission nursing  assessment completed. Pt :               Alert and oriented. anxious  RESP:      Even and non labored         Lung sounds:         Clear. diminished    Oxygen: room air  Complaints of:    Pain: ble chronic pain  IV: sl, TELE: no tele  Dressings:   Precautions:              Falls:        Shon:   Chart and meds reviewed. Noted Labs:   Plan for today:  Admission in progress. Safety from falls. Avasys in room. Resp even and non labored. .esgin     1634 ref lovenox shot. Pt insists a med from Dr Ernie Pugh at [facility]. No RX at pharmacy. Pt restless and anxious. \".       Their inpatient work up has included:    Imaging:    Imaging and other studies reviewed and discussed with patient and staff    Echocardiogram  : 3/1/2021  Transthoracic Echocardiography   Left Ventricle Left ventricular ejection fraction is visually estimated at 50%. Normal diastolic filling pattern for age. Right Ventricle Normal right ventricle structure and function. Left Atrium Normal left atrium. Right Atrium Normal right atrium. Mitral Valve Normal mitral valve structure and function. Tricuspid Valve Normal tricuspid valve structure and function. Aortic Valve Normal aortic valve structure and function. Pulmonic Valve Normal pulmonic valve structure and function. Pericardial Effusion No evidence of pericardial effusion. Pleural Effusion No evidence of pleural effusion. Aorta \ Miscellaneous Miscellaneous normal findings were found. M-Mode Measurements (cm)  LVIDd: 3.18 cm  IVSd: 0.91 cm                          AO Root Dimension: 3.17 cm  LVPWd: 0.99 cm  Rt. Vent.  Dimension: 2.53 cm           LVOT: 1.92 cm Doppler Measurements:  AV Velocity:0.03 m/s                   MV Peak E-Wave: 0.75 m/s  AV Peak Gradient: 6.96 mmHg            MV Peak A-Wave: 0.7 m/s  AV Mean Gradient: 3.26 mmHg  AV Area (Continuity):2.64 cm^2  TR Velocity:2.41 m/s  TR Gradient:23.18 mmHg Valves  Mitral Valve  Peak E-Wave: 0.75 m/s           Peak A-Wave: 0.7 m/s                                  E/A Ratio: 1.07                                  Peak Gradient: 2.25 mmHg                                  Deceleration Time: 167.9 msec  Aortic Valve  Peak Velocity: 1.32 m/s                Mean Velocity: 0.84 m/s  Peak Gradient: 6.96 mmHg               Mean Gradient: 3.26 mmHg  Area (continuity): 2.64 cm^2  AV VTI: 23.03 cm  Tricuspid Valve  TR Velocity: 2.41 m/s              TR Gradient: 23.18 mmHg  Pulmonic Valve  Peak Velocity: 0.89 m/s             Peak Gradient: 3.16 mmHg  LVOT  Peak Velocity: 1.04 m/s              Mean Velocity: 0.67 m/s  Peak Gradient: 4.31 mmHg             Mean Gradient: 2.06 mmHg  LVOT Diameter: 1.92 cm               LVOT VTI: 21.03 cm Structures  Left Atrium  LA Volume/Index: 24.08 ml /17 m^2             LA Area: 10.43 cm^2 Left Ventricle  Diastolic Dimension: 2.05 cm  Septum Diastolic: 8.62 cm  PW Diastolic: 1.80 cm               LV ESV/LV ESV Index: 21.92 ml/16 m^2  LV EDV/LV EDV Index: 40.2 ml/29 m^2  EF Calculated: 45.5 %  LVOT Diameter: 1.92 cm  Right Ventricle  Diastolic Dimension: 4.83 cm Aorta/ Miscellaneous Aorta  Aortic Root: 3.17 cm  LVOT Diameter: 1.92 cm    Xr Foot Left   3/1/2021  EXAM: XR FOOT LEFT (MIN 3 VIEWS) COMPARISON: None available HISTORY:   Foot swelling and redness TECHNIQUE: AP, lateral and oblique views of the foot obtained. FINDINGS: No acute fracture or dislocation. Joint spaces are preserved. Soft tissue edema of the midfoot. No acute osseous abnormality. Ct Head  : 3/12/2021  CT HEAD WO CONTRAST CLINICAL HISTORY:  fall Comparison: #[id number] TECHNIQUE: Multiple unenhanced serial axial images of the brain from the vertex of the skull to the base of the skull were performed. FINDINGS: The ventricles are dilated. Unchanged size configuration. .  No mass. No midline shift. The cisterns are patent. There are white matter and periventricular changes most likely consistent with chronic small vessel ischemic disease. The visualized osseous structures are unremarkable. The visualized portion of the paranasal sinuses, and mastoids are unremarkable. There is an area of soft tissue swelling hematoma overlying the right posterior parietal occipital region. Correlate with physical exam IMPRESSION NO ACUTE INTRA-AXIAL OR EXTRA-AXIAL FINDINGS. Ct Head   3/1/2021  CT Brain. Contrast medium:  without contrast.. History:  Fall. Head injury. . Technical factors: CT imaging of the brain was obtained and formatted as 5 mm contiguous axial images. 2.5 mm contiguous axial images were obtained through the osseous structures. Sagittal and coronal reconstruction obtained during postprocessing.  Comparison:  CT brain, February 8, 2021, December 13, 2019. Jose Morrow Findings: Extra-axial spaces:  Normal. Intracranial hemorrhage:  None. Ventricular system: Ventricles mildly enlarged. Sulci mildly prominent. Basal Cisterns:  Normal. Cerebral Parenchyma:  Bilateral symmetric periventricular areas decreased attenuation. Midline Shift:  None. Cerebellum:  Normal. Paranasal sinuses and mastoid air cells:  Normal. Visualized Orbits:  Normal.     Impression: Mild cerebral atrophy. Chronic ischemic white matter disease. Cta Chest   3/1/2021  CT PULMONARY ANGIOGRAM WITH INTRAVENOUS CONTRAST MEDIUM. REASON FOR EXAMINATION:  CHEST PAIN TECHNIQUE: Helical CTA was performed through the chest utilizing 100 mL of Isovue-300 intravenous contrast.  Images were obtained with bolus tracking in order to opacify the pulmonary arteries. Thick section coronal MIP 3D reconstructions were performed  on a separate workstation. COMPARISON:none FINDINGS:  Pulmonary arteries: No intraluminal filling defects. Thoracic aorta: Normal in course and caliber. Cardiac Size: Normal. Pericardial effusion: None. Right lung: No nodules, masses, consolidation, pleural effusion, pneumothorax. Emphysematous change. Left lung: No nodules, masses, pleural effusion, pneumothorax. Emphysematous change. Small area of dependent subsegmental atelectatic change. Lymph nodes: No hilar, mediastinal, or axillary lymph node enlargement. Upper abdomen:Limited imaging upper abdomen shows no gross anomaly. Musculoskeletal:No osteoblastic, and no osteolytic lesions. This space narrowing, thoracic spine, greatest at T7-T8. No CT evidence pulmonary embolism. Emphysema. Ct Cervical Spine Wo Contrast    Result Date: 3/12/2021  CT cervical spine without intravenous contrast medium. HISTORY: Fall. Hit posterior head. Confusion. Altered mental status. TECHNICAL FACTORS: CT cervical spine obtained and formatted as 2.5 mm contiguous axial images from skull base to the level of.  Sagittal and coronal reconstructions were obtained during postprocessing. No contrast medium was utilized. COMPARISON: CT cervical spine, December 13, 2019 FINDINGS: Cervical vertebral bodies are normal in height and alignment Atlantooccipital articulation maintained. Atlantoaxial interval preserved. No foraminal narrowing, right C3-4, bilateral C4-5, bilateral C5-6. Diffuse disc space narrowing C3-4, C5-6. Posterior osteophytes C4 and C5. No fractures, dislocations, bone lesions. Limited imaging lung apices without anomaly. Carotid arteries and soft tissues are without anomaly. No fracture. Moderate to marked degenerative change cervical spine. All CT scans at this facility use dose modulation, iterative reconstruction, and/or weight based dosing when appropriate to reduce radiation dose to as low as reasonably achievable. Ct Abdomen Pelvis   3/1/2021  EXAM:  CT ABDOMEN PELVIS W IV CONTRAST History: Right upper quadrant abdominal pain. Technique: Multiple contiguous axial images were obtained of the abdomen and pelvis from an level of the lung bases through the ischial tuberosities with IV contrast. Multiplanar reformats were obtained. Delayed images were obtained. Comparison: CT abdomen pelvis from February 6, 2021 Findings: Unchanged tiny hypodensity within the right lower liver is too small to definitively characterize but likely a cyst. The liver is otherwise unremarkable. The stomach, spleen, pancreas, and adrenal glands are within normal limits. The kidneys enhance uniformly. No urinary tract calculi or hydronephrosis. Circumferential urinary bladder wall thickening. Uterus is surgically absent. Abdominal aorta is nonaneurysmal  and demonstrates atherosclerotic calcification. 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 No retroperitoneal or abdominal/pelvic lymphadenopathy. No small bowel obstruction. No overt colonic mass or pericolonic inflammation. Appendix is within normal limits. No free fluid or free air. Degenerative changes of the spine are again identified and not significantly changed, including 9 mm of anterolisthesis of L4 on L5 and severe spinal canal stenosis at L4-L5. Circumferential urinary bladder wall thickening is concerning for cystitis for which correlation is recommended. Additional stable chronic findings as detailed. Us Dup Lower Extremity Left Jareth  3/1/2021   : NO EVIDENCE FOR DEEP VENOUS THROMBOSIS FROM THE LEVEL OF THE COMMON FEMORAL VEIN TO THE POPLITEAL FOSSA IN THE LEFT LEG(S). BAKER'S CYST. Labs:     labs reviewed and discussed with patient and staff    Lab Results   Component Value Date    POCGLU 102 03/13/2021    POCGLU 87 03/13/2021    POCGLU 122 03/12/2021    POCGLU 104 03/12/2021    POCGLU 74 02/07/2021     Lab Results   Component Value Date     03/12/2021    K 3.9 03/12/2021    K 4.4 02/09/2021    CL 92 03/12/2021    CO2 25 03/12/2021    BUN 6 03/12/2021    CREATININE 0.30 03/12/2021    CALCIUM 9.2 03/12/2021    LABALBU 4.0 03/12/2021    BILITOT 0.3 03/12/2021    ALKPHOS 96 03/12/2021    AST 31 03/12/2021    ALT 20 03/12/2021     Lab Results   Component Value Date    WBC 9.4 03/12/2021    RBC 3.96 03/12/2021    HGB 12.9 03/12/2021    HCT 38.0 03/12/2021    MCV 95.9 03/12/2021    MCH 32.6 03/12/2021    MCHC 34.0 03/12/2021    RDW 13.0 03/12/2021     03/12/2021    MPV 7.1 08/01/2014     Lab Results   Component Value Date    VITD25 12.8 02/08/2021     Lab Results   Component Value Date    COLORU Yellow 03/12/2021    NITRU Negative 03/12/2021    GLUCOSEU Negative 03/12/2021    KETUA Negative 03/12/2021    UROBILINOGEN 0.2 03/12/2021    BILIRUBINUR Negative 03/12/2021     Lab Results   Component Value Date    PROTIME 12.7 02/28/2021     Lab Results   Component Value Date    INR 0.9 02/28/2021         I discussed results with patient.     Current Rehabilitation pain, frequency, hematuria and urgency. Musculoskeletal: Positive for back pain, gait problem and myalgias. Negative for neck pain. Skin: Negative for rash. Allergic/Immunologic: Positive for immunocompromised state. Negative for environmental allergies. Neurological: Positive for weakness. Negative for dizziness, tremors, seizures and headaches. Hematological: Does not bruise/bleed easily. Psychiatric/Behavioral: Negative for hallucinations and suicidal ideas. The patient is not nervous/anxious. Physical Exam:    BP (!) 165/85   Pulse 65   Temp 97.3 °F (36.3 °C) (Oral)   Resp 18   Ht 5' (1.524 m)   Wt 82 lb (37.2 kg)   LMP  (LMP Unknown)   SpO2 100%   BMI 16.01 kg/m²      Physical Exam  Constitutional:       General: She is not in acute distress. Appearance: She is well-developed. She is ill-appearing. She is not toxic-appearing or diaphoretic. HENT:      Head: Normocephalic. Not macrocephalic and not microcephalic. No raccoon eyes or Reese's sign. Mouth/Throat:      Pharynx: Oropharyngeal exudate present. Eyes:      General: No scleral icterus. Right eye: No discharge. Left eye: No discharge. Conjunctiva/sclera: Conjunctivae normal.      Pupils: Pupils are equal, round, and reactive to light. Neck:      Musculoskeletal: Normal range of motion. Spinous process tenderness and muscular tenderness present. Thyroid: No thyromegaly. Vascular: Normal carotid pulses. No carotid bruit, hepatojugular reflux or JVD. Trachea: No tracheal deviation. Pulmonary:      Effort: Pulmonary effort is normal.      Breath sounds: No stridor. Decreased breath sounds present. Musculoskeletal:      Cervical back: She exhibits decreased range of motion and tenderness. Thoracic back: She exhibits decreased range of motion and tenderness. Lumbar back: She exhibits decreased range of motion and tenderness.    Skin:     General: Skin is warm and dry.      Coloration: Skin is pale. Findings: Bruising present. Comments: Old IV sites   Neurological:      Sensory: Sensory deficit present. Coordination: Coordination abnormal.      Gait: Gait abnormal.      Deep Tendon Reflexes:      Reflex Scores:       Tricep reflexes are 1+ on the right side and 1+ on the left side. Bicep reflexes are 1+ on the right side and 1+ on the left side. Brachioradialis reflexes are 1+ on the right side and 1+ on the left side. Patellar reflexes are 0 on the right side and 0 on the left side. Achilles reflexes are 0 on the right side and 0 on the left side. Psychiatric:         Attention and Perception: She is attentive. Mood and Affect: Mood is not anxious or depressed. Affect is not angry. Speech: Speech is rapid and pressured and tangential. Speech is not delayed. Behavior: Behavior is not slowed or withdrawn. Thought Content: Thought content normal.         Cognition and Memory: Cognition is impaired. Memory is not impaired. She exhibits impaired recent memory. She does not exhibit impaired remote memory. Judgment: Judgment is inappropriate. Judgment is not impulsive. Comments: Tangential verbose very poor awareness of her deficits       Ortho Exam  Neurologic Exam     Cranial Nerves     CN III, IV, VI   Pupils are equal, round, and reactive to light.     Motor Exam   Muscle bulk: decreased    Sensory Exam   Right leg light touch: decreased from ankle  Left leg light touch: decreased from ankle    Gait, Coordination, and Reflexes     Reflexes   Right brachioradialis: 1+  Left brachioradialis: 1+  Right biceps: 1+  Left biceps: 1+  Right triceps: 1+  Left triceps: 1+  Right patellar: 0  Left patellar: 0  Right achilles: 0  Left achilles: 0            Diagnostics:    Recent Results (from the past 24 hour(s))   COVID-19, Rapid    Collection Time: 03/12/21 12:02 PM    Specimen: Nasopharyngeal Swab Result Value Ref Range    SARS-CoV-2, NAAT Not Detected Not Detected   POCT Glucose    Collection Time: 03/12/21  5:11 PM   Result Value Ref Range    POC Glucose 104 60 - 115 mg/dl    Performed on ACCU-CHEK    POCT Glucose    Collection Time: 03/12/21  8:58 PM   Result Value Ref Range    POC Glucose 122 (H) 60 - 115 mg/dl    Performed on ACCU-CHEK    POCT Glucose    Collection Time: 03/13/21  6:38 AM   Result Value Ref Range    POC Glucose 87 60 - 115 mg/dl    Performed on ACCU-CHEK    POCT Glucose    Collection Time: 03/13/21 10:30 AM   Result Value Ref Range    POC Glucose 102 60 - 115 mg/dl    Performed on ACCU-CHEK               Impression:    1. 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 Fatigue and Malaise      Complex Active General Medical Issues thatcomplicate care Assess & Plan:     1. Active Problems:    Hyponatremia    HTN (hypertension)    Metabolic encephalopathy    Severe malnutrition (HCC)    Poor compliance with medication    Dementia (HCC)  Resolved Problems:    * No resolved hospital problems. *            Recommendations:    1. Considering all of the factors aboveincluding the patient's current medical status, social status/home envirnment, their functional needs and their ability to participate in a therapy program, I feel that [facility] It is my opinion that they will be able to tolerate and benefit from 3 hours of therapy a day but I think that she may be better served at a skilled level because she may need long-term placement. I reviewed the varous local options re these levels of care with the patient and family. 2. Nursing care to focus on bowel and bladder issues. 3. Vitamin B12 shot times one  4. Improve hydration and Nutrition by adding Proteinex and push PO fluids       It was my pleasure to evaluate Collin Woody today. Please call [phone] with questions.     Tisha Strange, DO

## 2021-03-13 NOTE — CONSULTS
Financial resource strain: Not on file    Food insecurity     Worry: Not on file     Inability: Not on file    Transportation needs     Medical: Not on file     Non-medical: Not on file   Tobacco Use    Smoking status: Current Every Day Smoker    Smokeless tobacco: Never Used   Substance and Sexual Activity    Alcohol use: Yes     Comment: occassional    Drug use: No    Sexual activity: Not on file   Lifestyle    Physical activity     Days per week: Not on file     Minutes per session: Not on file    Stress: Not on file   Relationships    Social connections     Talks on phone: Not on file     Gets together: Not on file     Attends Church service: Not on file     Active member of club or organization: Not on file     Attends meetings of clubs or organizations: Not on file     Relationship status: Not on file    Intimate partner violence     Fear of current or ex partner: Not on file     Emotionally abused: Not on file     Physically abused: Not on file     Forced sexual activity: Not on file   Other Topics Concern    Not on file   Social History Narrative    [address] 65 in [place] living with SO-no longer can stay there. Now for LTC placement--ETOH dementia                                  Family History:   History reviewed. No pertinent family history. Review of Systems:   Review of Systems   Constitutional: Negative for activity change. HENT: Negative for congestion. Eyes: Negative for discharge. Respiratory: Negative for wheezing. Cardiovascular: Negative for leg swelling. Endocrine: Negative for cold intolerance. Genitourinary: Negative for difficulty urinating. Musculoskeletal: Negative for arthralgias. Allergic/Immunologic: Negative for environmental allergies. Neurological: Positive for dizziness. Hematological: Negative for adenopathy. Psychiatric/Behavioral: Negative for agitation.          Physical exam:   Constitutional:  VITALS:  BP (!) 165/85 Physician             Cardiology                                                      [address] [place] [address] Procedure Type of Study  TTE procedure:ECHO COMPLETE 2D W/DOP W/COLOR. Procedure Date Date: 03/01/2021 Start: 02:10 PM Study Location: Portable Technical Quality: Adequate visualization Indications:Chest pain. Patient Status: Routine Height: 70 inches Weight: 78 pounds BSA: 1.4 m^2 BMI: 11.19 kg/m^2 BP: 119/81 mmHg  Conclusions  Summary  TDS  Left ventricular ejection fraction is visually estimated at 50%. Normal diastolic filling pattern for age. Signature  ----------------------------------------------------------------  Electronically signed by Nickie Sun(Interpreting physician)  on 03/01/2021 03:52 PM  ----------------------------------------------------------------  Findings Left Ventricle Left ventricular ejection fraction is visually estimated at 50%. Normal diastolic filling pattern for age. Right Ventricle Normal right ventricle structure and function. Left Atrium Normal left atrium. Right Atrium Normal right atrium. Mitral Valve Normal mitral valve structure and function. Tricuspid Valve Normal tricuspid valve structure and function. Aortic Valve Normal aortic valve structure and function. Pulmonic Valve Normal pulmonic valve structure and function. Pericardial Effusion No evidence of pericardial effusion. Pleural Effusion No evidence of pleural effusion. Aorta \ Miscellaneous Miscellaneous normal findings were found. M-Mode Measurements (cm)  LVIDd: 3.18 cm  IVSd: 0.91 cm                          AO Root Dimension: 3.17 cm  LVPWd: 0.99 cm  Rt. Vent.  Dimension: 2.53 cm           LVOT: 1.92 cm Doppler Measurements:  AV Velocity:0.03 m/s                   MV Peak E-Wave: 0.75 m/s  AV Peak Gradient: 6.96 mmHg            MV Peak A-Wave: 0.7 m/s  AV Mean Gradient: 3.26 mmHg  AV Area (Continuity):2.64 cm^2  TR Velocity:2.41 m/s  TR Gradient:23.18 mmHg Valves  Mitral Valve  Peak E-Wave: 0.75 m/s           Peak A-Wave: 0.7 m/s                                  E/A Ratio: 1.07                                  Peak Gradient: 2.25 mmHg                                  Deceleration Time: 167.9 msec  Aortic Valve  Peak Velocity: 1.32 m/s                Mean Velocity: 0.84 m/s  Peak Gradient: 6.96 mmHg               Mean Gradient: 3.26 mmHg  Area (continuity): 2.64 cm^2  AV VTI: 23.03 cm  Tricuspid Valve  TR Velocity: 2.41 m/s              TR Gradient: 23.18 mmHg  Pulmonic Valve  Peak Velocity: 0.89 m/s             Peak Gradient: 3.16 mmHg  LVOT  Peak Velocity: 1.04 m/s              Mean Velocity: 0.67 m/s  Peak Gradient: 4.31 mmHg             Mean Gradient: 2.06 mmHg  LVOT Diameter: 1.92 cm               LVOT VTI: 21.03 cm Structures  Left Atrium  LA Volume/Index: 24.08 ml /17 m^2             LA Area: 10.43 cm^2  Left Ventricle  Diastolic Dimension: 7.18 cm  Septum Diastolic: 4.49 cm  PW Diastolic: 7.54 cm               LV ESV/LV ESV Index: 21.92 ml/16 m^2  LV EDV/LV EDV Index: 40.2 ml/29 m^2  EF Calculated: 45.5 %  LVOT Diameter: 1.92 cm  Right Ventricle  Diastolic Dimension: 5.96 cm Aorta/ Miscellaneous Aorta  Aortic Root: 3.17 cm  LVOT Diameter: 1.92 cm    Xr Foot Left (min 3 Views)    Result Date: 3/1/2021  EXAM: XR FOOT LEFT (MIN 3 VIEWS) COMPARISON: None available HISTORY:   Foot swelling and redness TECHNIQUE: AP, lateral and oblique views of the foot obtained. FINDINGS: No acute fracture or dislocation. Joint spaces are preserved. Soft tissue edema of the midfoot. No acute osseous abnormality. Ct Head Wo Contrast    Result Date: 3/12/2021  CT HEAD WO CONTRAST CLINICAL HISTORY:  fall Comparison: #[id number] TECHNIQUE: Multiple unenhanced serial axial images of the brain from the vertex of the skull to the base of the skull were performed. FINDINGS: The ventricles are dilated. Unchanged size configuration. .  No mass. No midline shift. The cisterns are patent. There are white matter and periventricular changes most likely consistent with chronic small vessel ischemic disease. The visualized osseous structures are unremarkable. The visualized portion of the paranasal sinuses, and mastoids are unremarkable. There is an area of soft tissue swelling hematoma overlying the right posterior parietal occipital region. Correlate with physical exam IMPRESSION NO ACUTE INTRA-AXIAL OR EXTRA-AXIAL FINDINGS. All CT scans at this facility use dose modulation, iterative reconstruction, and/or weight based dosing when appropriate to reduce radiation dose to as low as reasonably achievable. Ct Head Wo Contrast    Result Date: 3/1/2021  CT Brain. Contrast medium:  without contrast.. History:  Fall. Head injury. . Technical factors: CT imaging of the brain was obtained and formatted as 5 mm contiguous axial images. 2.5 mm contiguous axial images were obtained through the osseous structures. Sagittal and coronal reconstruction obtained during postprocessing. Comparison:  CT brain, February 8, 2021, December 13, 2019. Windy Durga Findings: Extra-axial spaces:  Normal. Intracranial hemorrhage:  None. Ventricular system: Ventricles mildly enlarged. Sulci mildly prominent. Basal Cisterns:  Normal. Cerebral Parenchyma:  Bilateral symmetric periventricular areas decreased attenuation. Midline Shift:  None. Cerebellum:  Normal. Paranasal sinuses and mastoid air cells:  Normal. Visualized Orbits:  Normal.     Impression: Mild cerebral atrophy. Chronic ischemic white matter disease. All CT scans at this facility use dose modulation, iterative reconstruction, and/or weight based dosing when appropriate to reduce radiation dose to as low as reasonably achievable. Cta Chest W Wo Contrast    Result Date: 3/1/2021  CT PULMONARY ANGIOGRAM WITH INTRAVENOUS CONTRAST MEDIUM.  REASON FOR EXAMINATION:  CHEST PAIN TECHNIQUE: Helical CTA was performed through the chest utilizing 100 mL of Isovue-300 intravenous contrast.  Images were obtained with bolus tracking in order to opacify the pulmonary arteries. Thick section coronal MIP 3D reconstructions were performed  on a separate workstation. COMPARISON:none FINDINGS:  Pulmonary arteries: No intraluminal filling defects. Thoracic aorta: Normal in course and caliber. Cardiac Size: Normal. Pericardial effusion: None. Right lung: No nodules, masses, consolidation, pleural effusion, pneumothorax. Emphysematous change. Left lung: No nodules, masses, pleural effusion, pneumothorax. Emphysematous change. Small area of dependent subsegmental atelectatic change. Lymph nodes: No hilar, mediastinal, or axillary lymph node enlargement. Upper abdomen:Limited imaging upper abdomen shows no gross anomaly. Musculoskeletal:No osteoblastic, and no osteolytic lesions. This space narrowing, thoracic spine, greatest at T7-T8. No CT evidence pulmonary embolism. Emphysema. All CT scans at this facility use dose modulation, iterative reconstruction, and/or weight based dosing when appropriate to reduce radiation dose to as low as reasonably achievable. Ct Cervical Spine Wo Contrast    Result Date: 3/12/2021  CT cervical spine without intravenous contrast medium. HISTORY: Fall. Hit posterior head. Confusion. Altered mental status. TECHNICAL FACTORS: CT cervical spine obtained and formatted as 2.5 mm contiguous axial images from skull base to the level of. Sagittal and coronal reconstructions were obtained during postprocessing. No contrast medium was utilized. COMPARISON: CT cervical spine, December 13, 2019 FINDINGS: Cervical vertebral bodies are normal in height and alignment Atlantooccipital articulation maintained. Atlantoaxial interval preserved. No foraminal narrowing, right C3-4, bilateral C4-5, bilateral C5-6. Diffuse disc space narrowing C3-4, C5-6. Posterior osteophytes C4 and C5. No fractures, dislocations, bone lesions.  Limited imaging lung apices without anomaly. Carotid arteries and soft tissues are without anomaly. No fracture. Moderate to marked degenerative change cervical spine. All CT scans at this facility use dose modulation, iterative reconstruction, and/or weight based dosing when appropriate to reduce radiation dose to as low as reasonably achievable. Ct Abdomen Pelvis W Iv Contrast    Result Date: 3/1/2021  EXAM:  CT ABDOMEN PELVIS W IV CONTRAST History: Right upper quadrant abdominal pain. Technique: Multiple contiguous axial images were obtained of the abdomen and pelvis from an level of the lung bases through the ischial tuberosities with IV contrast. Multiplanar reformats were obtained. Delayed images were obtained. Comparison: CT abdomen pelvis from February 6, 2021 Findings: Unchanged tiny hypodensity within the right lower liver is too small to definitively characterize but likely a cyst. The liver is otherwise unremarkable. The stomach, spleen, pancreas, and adrenal glands are within normal limits. The kidneys enhance uniformly. No urinary tract calculi or hydronephrosis. Circumferential urinary bladder wall thickening. Uterus is surgically absent. Abdominal aorta is nonaneurysmal  and demonstrates atherosclerotic calcification. 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 No retroperitoneal or abdominal/pelvic lymphadenopathy. No small bowel obstruction. No overt colonic mass or pericolonic inflammation. Appendix is within normal limits. No free fluid or free air. Degenerative changes of the spine are again identified and not significantly changed, including 9 mm of anterolisthesis of L4 on L5 and severe spinal canal stenosis at L4-L5. Circumferential urinary bladder wall thickening is concerning for cystitis for which correlation is recommended. Additional stable chronic findings as detailed.  All CT scans at this facility use dose modulation, iterative reconstruction, and/or weight based dosing when appropriate to reduce radiation dose to as low as reasonably achievable. Us Dup Lower Extremity Left Jareth    Result Date: 3/1/2021  LEFT LOWER EXTREMITY VENOUS DUPLEX EXAM: REASON FOR EXAMINATION: Left calf tenderness. A venous duplex exam of the left lower extremity was obtained. The following veins were evaluated, including the common femoral, femoral, popliteal and calf veins. The veins were evaluated with color Doppler imaging, compression and augmentation if possible. All the veins demonstrated show no evidence for deep venous thrombosis. There is a fluid collection within the left popliteal fossa which measures 2.7 x 1.5 x 1.1 cm, suspected Baker's cyst. CONCLUSION: NO EVIDENCE FOR DEEP VENOUS THROMBOSIS FROM THE LEVEL OF THE COMMON FEMORAL VEIN TO THE POPLITEAL FOSSA IN THE LEFT LEG(S). BAKER'S CYST. Assessment/  61-year-old lady with recurrent hyponatremia with urine osmolality is in the 300s before consistent with some degree of ADH secretion it is exacerbated by excess fluid/alcohol intake. In terms of concern for any malignancy, she has had CT scan of the chest abdomen pelvis as well as head that are all negative. It is odd that she has had low potassiums as well. Plan/  1-hold potassium supplementation  2-repeat labs today including urine electrolytes and urine osmolality  3-1500 cc fluid restriction - sounds like she drinks at least 4-5 liters /day normally    Thank you for the consultation. Please do not hesitate to call with questions.     Stacey Byrd

## 2021-03-13 NOTE — PROGRESS NOTES
commands    Perception Status:  Perception  Overall Perceptual Status: WFL    Sensation Status:  Sensation  Overall Sensation Status: Impaired(Pt reported tingling in B hands > and B feet \"every so often\")    Vision and Hearing Status:  Vision  Vision: Impaired  Vision Exceptions: Wears glasses at all times(pt stated lost glasses)  Hearing  Hearing: Within functional limits     ROM:   LUE AROM (degrees)  LUE AROM : WFL  Left Hand AROM (degrees)  Left Hand AROM: WFL  RUE AROM (degrees)  RUE AROM : WFL  Right Hand AROM (degrees)  Right Hand AROM: WFL    Strength:  LUE Strength  L Hand General: 4+/5  LUE Strength Comment: 4+/5 grossly assessed  RUE Strength  R Hand General: 4+/5  RUE Strength Comment: 4+/5 grossly assessed    Coordination, Tone, Quality of Movement:    Tone RUE  RUE Tone: Normotonic  Tone LUE  LUE Tone: Normotonic  Coordination  Movements Are Fluid And Coordinated: Yes    Hand Dominance:  Hand Dominance  Hand Dominance: Right    ADL Status:  ADL  Feeding: Independent  Grooming: Supervision  UE Bathing: Supervision  LE Bathing: Supervision  UE Dressing: Supervision  LE Dressing: Supervision  Toileting: Supervision  Additional Comments: ADL's simulated          Therapy key for assistance levels -   Independent = Pt. is able to perform task with no assistance but may require a device   Stand by assistance = Pt. does not perform task at an independent level but does not need physical assistance, requires verbal cues  Minimal, Moderate, Maximal Assistance = Pt. requires physical assistance (25%, 50%, 75% assist from helper) for task but is able to actively participate in task   Dependent = Pt. requires total assistance with task and is not able to actively participate with task completion     Functional Mobility:  Functional Mobility  Functional - Mobility Device: No device  Activity: Other(to door and back to bed)  Assist Level: Supervision       Bed Mobility  Bed mobility  Supine to Sit: Modified independent  Sit to Supine: Modified independent    Seated and Standing Balance:  Balance  Sitting Balance: Independent  Standing Balance: Supervision    Functional Endurance:  Activity Tolerance  Activity Tolerance: Patient Tolerated treatment well    D/C Recommendations:  OT D/C RECOMMENDATIONS  REQUIRES OT FOLLOW UP: No    Equipment Recommendations:       OT Education:   OT Education  OT Education: OT Role  Barriers to Learning: none    OT Follow Up:  OT D/C RECOMMENDATIONS  REQUIRES OT FOLLOW UP: No       Assessment/Discharge Disposition:  Assessment: Pt is a 61 y.o. female s/p fall. Pt supervised in room. Pt required cues to redirect to attend to tasks. Performance deficits / Impairments: Decreased high-level IADLs  Discharge Recommendations: Continue to assess pending progress  Decision Making: Low Complexity  History: Multi comorb  Exam: 1 perf imp  Assistance / Modification: supervised/IND    Six Click Score   How much help for putting on and taking off regular lower body clothing?: None  How much help for Bathing?: A Little  How much help for Toileting?: None  How much help for putting on and taking off regular upper body clothing?: None  How much help for taking care of personal grooming?: None  How much help for eating meals?: None  AM-[facility] Inpatient Daily Activity Raw Score: 23  AM-[facility] Inpatient ADL T-Scale Score : 51.12  ADL Inpatient CMS 0-100% Score: 15.86    Plan:  Plan  Times per week: no acute OT recommended at this time    Goals: n/a      Patient Goal:    home   Discussed and agreed upon: Yes Comments:     Therapy Time:   OT Individual Minutes  Time In: 1305  Time Out: 1320  Minutes: 15    Eval: 15 minutes     Electronically signed by:     BROCK Head  3/13/2021, 1:31 PM

## 2021-03-14 LAB
ANION GAP SERPL CALCULATED.3IONS-SCNC: 7 MEQ/L (ref 9–15)
BUN BLDV-MCNC: 6 MG/DL (ref 8–23)
CALCIUM SERPL-MCNC: 8.6 MG/DL (ref 8.5–9.9)
CHLORIDE BLD-SCNC: 96 MEQ/L (ref 95–107)
CO2: 24 MEQ/L (ref 20–31)
CREAT SERPL-MCNC: 0.35 MG/DL (ref 0.5–0.9)
GFR AFRICAN AMERICAN: >60
GFR NON-AFRICAN AMERICAN: >60
GLUCOSE BLD-MCNC: 184 MG/DL (ref 60–115)
GLUCOSE BLD-MCNC: 73 MG/DL (ref 60–115)
GLUCOSE BLD-MCNC: 88 MG/DL (ref 70–99)
GLUCOSE BLD-MCNC: 99 MG/DL (ref 60–115)
PERFORMED ON: ABNORMAL
PERFORMED ON: NORMAL
PERFORMED ON: NORMAL
POTASSIUM SERPL-SCNC: 3.7 MEQ/L (ref 3.4–4.9)
SODIUM BLD-SCNC: 127 MEQ/L (ref 135–144)

## 2021-03-14 PROCEDURE — 2060000000 HC ICU INTERMEDIATE R&B

## 2021-03-14 PROCEDURE — 6370000000 HC RX 637 (ALT 250 FOR IP): Performed by: INTERNAL MEDICINE

## 2021-03-14 PROCEDURE — 2580000003 HC RX 258: Performed by: INTERNAL MEDICINE

## 2021-03-14 PROCEDURE — 99232 SBSQ HOSP IP/OBS MODERATE 35: CPT | Performed by: PHYSICAL MEDICINE & REHABILITATION

## 2021-03-14 PROCEDURE — 80048 BASIC METABOLIC PNL TOTAL CA: CPT

## 2021-03-14 PROCEDURE — 36415 COLL VENOUS BLD VENIPUNCTURE: CPT

## 2021-03-14 PROCEDURE — 99254 IP/OBS CNSLTJ NEW/EST MOD 60: CPT | Performed by: PSYCHIATRY & NEUROLOGY

## 2021-03-14 RX ORDER — TOLVAPTAN 15 MG/1
15 TABLET ORAL ONCE
Status: COMPLETED | OUTPATIENT
Start: 2021-03-14 | End: 2021-03-14

## 2021-03-14 RX ADMIN — ACETAMINOPHEN 650 MG: 325 TABLET ORAL at 11:33

## 2021-03-14 RX ADMIN — Medication 15 MG: at 14:01

## 2021-03-14 RX ADMIN — FOLIC ACID 1 MG: 1 TABLET ORAL at 09:13

## 2021-03-14 RX ADMIN — Medication 100 MG: at 09:13

## 2021-03-14 RX ADMIN — SODIUM CHLORIDE, PRESERVATIVE FREE 10 ML: 5 INJECTION INTRAVENOUS at 20:52

## 2021-03-14 RX ADMIN — ACETAMINOPHEN 650 MG: 325 TABLET ORAL at 04:28

## 2021-03-14 RX ADMIN — AMLODIPINE BESYLATE 5 MG: 5 TABLET ORAL at 09:13

## 2021-03-14 ASSESSMENT — PAIN DESCRIPTION - LOCATION: LOCATION: BACK

## 2021-03-14 ASSESSMENT — PAIN DESCRIPTION - PAIN TYPE: TYPE: ACUTE PAIN

## 2021-03-14 ASSESSMENT — PAIN SCALES - GENERAL
PAINLEVEL_OUTOF10: 5
PAINLEVEL_OUTOF10: 0

## 2021-03-14 NOTE — PROGRESS NOTES
Subjective: The patient complains of severe acute on chronic progressive fatigue and ataxia partially relieved by rest, PT, OT and meds and exacerbated by exertion and recent illness. I am concerned about patients medical complexities. She is very poor awareness of her deficits-psychiatry consult is pending she is at least submanic if not manic. Reviewed recent nursing note and discussed current status and planned care with acute care providers, \" A&O X 3, DOES HAVE SCATTERED THOUGHTS AT TIMES. DISCUSSED DC PLAN AND PT WISHES TO GO HOME TODAY. PT/OT EVAL DONE AND PT DID WELL WITHOUT ANY ASSISTIVE DEVICE. WAS NOT ACCEPTED TO [facility]. PT DENIES NEED FOR HHC. I THEN CALLED PTS DTR, VICKY,  TO UPDATE HER ON DC PLAN, SINCE VICKY WAS WANTING PT TO GO TO REHAB AND SPOKE WITH ASHLEY SAMANO. VICKY VOICED HER FRUSTRATION, STATING \"YOU GUYS DON'T CARE\", I DID ADVISE VICKY THAT HER MOTHER IS A&O X 3 AND IS MAKING HER OWN CHOICES. ATTEMPTED TO GIVE HER RESOURCES AND SHE STATED \"NO ONE CARES SO WHY SHOULD I DO ANYTHING, I'M DONE\". DC PLAN WILL BE HOME WITH BOYFRIENBILLY, RN UPDATED. \".    ROS x10: The patient also complains of severely impaired mobility and activities of daily living. Otherwise no new problems with vision, hearing, nose, mouth, throat, dermal, cardiovascular, GI, , pulmonary, musculoskeletal, psychiatric or neurological. See Rehab consult on Rehab chart . Vital signs:  BP (!) 143/86   Pulse 66   Temp 97.3 °F (36.3 °C) (Oral)   Resp 18   Ht 5' (1.524 m)   Wt 82 lb (37.2 kg)   LMP  (LMP Unknown)   SpO2 100%   BMI 16.01 kg/m²   I/O:   PO/Intake:    fair PO intake,       Bowel/Bladder:   continent,    General:  Patient is well developed, adequately nourished, non-obese and     well kempt. HEENT:    PERRLA, hearing intact to loud voice, external inspection of ear     and nose benign.   Inspection of lips, tongue and gums benign  Musculoskeletal: No significant change in strength or tone. All joints stable. Inspection and palpation of digits and nails show no clubbing,       cyanosis or inflammatory conditions. Neuro/Psychiatric: Affect: flat-  Alert and oriented to self and     Situation with  MOD cues. No significant change in deep tendon reflexes or     Sensation-tangential and verbose lack of insight into her deficits manic/submanic  Lungs:  Diminished, CTA-B. Respiration effort is normal at rest.  NUR  Heart:   S1 = S2,   RRR. No loud murmurs. Abdomen:  Soft, non-tender, no enlargement of liver or spleen. Extremities:  No significant lower extremity edema or tenderness. Skin:    BUE bruises dt blood draws, no visualized or palpated problems. Rehabilitation:  Physical therapy: FIMS:  Bed Mobility:      Transfers: Sit to Stand: Supervision  Stand to sit: Supervision  Bed to Chair: Supervision, Ambulation 1  Device: No Device  Assistance: Supervision  Quality of Gait: L foot with decreased DF in terminal swing  Distance: 80ft  Comments: with multiple turns,      FIMS:  ,  , Assessment: Pt is indep/supervision with functional mobility. Pt demonstrates safe functional mobility for home going with family support    Patient has significant gait instability  Occupational therapy: FIMS:   ,  , Assessment: Pt is a 61 y.o. female s/p fall. Pt supervised in room. Pt required cues to redirect to attend to tasks.     Speech therapy: FIMS:      Patient has significant cognitive deficits      Lab/X-ray studies reviewed, analyzed and discussed with patient and staff:   Recent Results (from the past 24 hour(s))   Basic Metabolic Panel    Collection Time: 03/13/21 10:26 AM   Result Value Ref Range    Sodium 130 (L) 135 - 144 mEq/L    Potassium 3.5 3.4 - 4.9 mEq/L    Chloride 96 95 - 107 mEq/L    CO2 26 20 - 31 mEq/L    Anion Gap 8 (L) 9 - 15 mEq/L    Glucose 95 70 - 99 mg/dL    BUN 6 (L) 8 - 23 mg/dL    CREATININE 0.50 0.50 - 0.90 mg/dL    GFR Non-African American >60.0 >60 GFR  >60.0 >60    Calcium 8.9 8.5 - 9.9 mg/dL   POCT Glucose    Collection Time: 03/13/21 10:30 AM   Result Value Ref Range    POC Glucose 102 60 - 115 mg/dl    Performed on ACCU-CHEK    Osmolality, Urine    Collection Time: 03/13/21  2:40 PM   Result Value Ref Range    Osmolality, Ur 372 300 - 900 mOsm/kg   Creatinine, Random Urine    Collection Time: 03/13/21  2:40 PM   Result Value Ref Range    Creatinine, Ur 34.1 Not Established mg/dL   Sodium, urine, random    Collection Time: 03/13/21  2:40 PM   Result Value Ref Range    Sodium, Ur 124 Not Established mEq/L   POTASSIUM, URINE, RANDOM    Collection Time: 03/13/21  2:40 PM   Result Value Ref Range    Potassium, Ur 17.2 Not Established mEq/L   CHLORIDE, URINE, RANDOM    Collection Time: 03/13/21  2:40 PM   Result Value Ref Range    Chloride 80 Not Established mEq/L   Vitamin B12 & Folate    Collection Time: 03/13/21  3:21 PM   Result Value Ref Range    Vitamin B-12 211 (L) 232 - 1245 pg/mL    Folate 10.8 7.3 - 26.1 ng/mL   POCT Glucose    Collection Time: 03/13/21  3:45 PM   Result Value Ref Range    POC Glucose 110 60 - 115 mg/dl    Performed on ACCU-CHEK    POCT Glucose    Collection Time: 03/13/21  8:18 PM   Result Value Ref Range    POC Glucose 82 60 - 115 mg/dl    Performed on ACCU-CHEK    Basic Metabolic Panel    Collection Time: 03/14/21  6:37 AM   Result Value Ref Range    Sodium 127 (L) 135 - 144 mEq/L    Potassium 3.7 3.4 - 4.9 mEq/L    Chloride 96 95 - 107 mEq/L    CO2 24 20 - 31 mEq/L    Anion Gap 7 (L) 9 - 15 mEq/L    Glucose 88 70 - 99 mg/dL    BUN 6 (L) 8 - 23 mg/dL    CREATININE 0.35 (L) 0.50 - 0.90 mg/dL    GFR Non-African American >60.0 >60    GFR  >60.0 >60    Calcium 8.6 8.5 - 9.9 mg/dL       Previous extensive, complex labs, notes and diagnostics reviewed and analyzed.      ALLERGIES:    Allergies as of 03/11/2021 - Review Complete 03/11/2021   Allergen Reaction Noted    Zantac [ranitidine hcl]  06/02/2018 (please also verify by checking [facility])     Complex Physical Medicine & Rehab Issues Assess & Plan:   1. 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 Functional and medical status reassessed regarding patients ability to participate in therapies and patient found to be able to participate in  acute intensive comprehensive inpatient rehabilitation program including PT/OT to improve balance, ambulation, ADLs, and to improve the P/AROM. It is my opinion that they will be able to tolerate 3 hours of therapy a day and benefit from it at an acute level. However she is so tangential I doubt that she would participate in a meaningful way. If she decides to come to rehab I am okay with that because she does have needs regarding her gait instability and cognition however she would need to have improved insight. 2. Bowel constipation and Bladder dysfunction overactive bladder:  frequent toileting, ambulate to bathroom with assistance, check post void residuals. Check for C.difficile x1 if >2 loose stools in 24 hours, continue bowel & bladder program.  Monitor for UTI symptoms including lethargy and confusion  3. Moderate mid and low back pain and generalized OA pain: reassess pain every shift and prior to and after each therapy session, give prn  Tylenol, modalities prn in therapy, consider Lidoderm, K-pad prn.   4. Skin breakdown risk:  continue pressure relief program.  Daily skin exams and reports from nursing. 5. Severe fatigue due to immobility and nutritional deficits: Add vitamin B12 vitamin D and CoQ10 titrate dosing and add protein supplementation with low carb content. 6. Complex discharge planning:   Patient okay for acute rehab if she or her insight and participation motivation improved.   Because she is for the most part refusing therapy I feel that she may be noncompliant and okay to go home with family support. Complex Active General Medical Issues that complicate care Assess & Plan:     1. Active Problems:    Hyponatremia    HTN (hypertension)    Metabolic encephalopathy    Severe malnutrition (HCC)    Poor compliance with medication    Dementia (HCC)  Resolved Problems:    * No resolved hospital problems.  Gabriel Melgoza D.O., PM&R     Attending    [employer] [facility]

## 2021-03-14 NOTE — CONSULTS
[address] [place] [address] Department of Psychiatry  Behavioral Health Consult    REASON FOR CONSULT: [facility]    CONSULTING PHYSICIAN:     History obtained from: patient    HISTORY OF PRESENT ILLNESS:      The patient is a 61 y.o. female with significant past psychiatric history of alcoholism  Patient was seen a month ago under similar circumstances but when she appeared more confused and disoriented  Patient is doing better when compared to from her mental health standpoint  She continued to minimize alcohol use  She reported that she drank about 3-4 beers over the last 3 weeks  She denies going through any withdrawal  Liver enzymes seems to be better than in the past  Patient denies any depressive symptoms. She is really trying to stop drinking  Patient does not want to do that the severity of her problems if she continued to drink  Patient is able to analyze the risk-benefit alternative  Able to retain information given to her  Patient is able to make cerebral information      The patient is not currently receiving care for the above psychiatric illness. Psychiatric Review of Systems       Depression: denies     Reema or Hypomania:  no     Panic Attacks:  no     Phobias:  no     Obsessions and Compulsions:  no     PTSD : no     Hallucinations:  no     Delusions:  no      Past Psychiatric History:  Denies any past illness    Past Medical History:        Diagnosis Date    [facility] [address]     [facility]     ETOH abuse        Past Surgical History:    History reviewed. No pertinent surgical history.     Medications Prior to Admission:   Medications Prior to Admission: cephALEXin (KEFLEX) 500 MG capsule, Take 1 capsule by mouth 3 times daily  Vitamin D (CHOLECALCIFEROL) 50 MCG (2000 UT) TABS tablet, Take 1 tablet by mouth daily  potassium chloride (KLOR-CON M) 20 MEQ extended release tablet, Take 1 tablet by mouth daily  amLODIPine (NORVASC) 2.5 MG tablet, Take 2.5 mg by mouth daily  ammonium lactate (LAC-HYDRIN) 12 % lotion, Apply topically as needed for Dry Skin Apply topically as needed. Allergies:  Zantac [ranitidine hcl]    FAMILY/SOCIAL HISTORY:  History reviewed. No pertinent family history. 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   Social Needs    Financial resource strain: Somewhat hard    Food insecurity     Worry: Sometimes true     Inability: Sometimes true    Transportation needs     Medical: Yes     Non-medical: Yes   Tobacco Use    Smoking status: Current Every Day Smoker    Smokeless tobacco: Never Used   Substance and Sexual Activity    Alcohol use: Yes     Comment: occassional    Drug use: No    Sexual activity: Yes     Partners: Male   Lifestyle    Physical activity     Days per week: 0 days     Minutes per session: 0 min    Stress: Very much   Relationships    Social connections     Talks on phone: Once a week     Gets together: Once a week     Attends Church service: Never     Active member of club or organization: No     Attends meetings of clubs or organizations: Not on file     Relationship status: Living with partner    Intimate partner violence     Fear of current or ex partner: No     Emotionally abused: No     Physically abused: No     Forced sexual activity: No   Other Topics Concern    Not on file   Social History Narrative    [address] in [place] living with SO-no longer can stay there.     Now for LTC placement--ETOH dementia                                  REVIEW OF SYSTEMS    Constitutional: [] fever  [] chills  [] weight loss  []weakness [] Other:  Eyes:  [] photophobia  [] discharge [] acuity change   [] Diplopia   [] Other:  HENT:  [] sore throat  [] ear pain [] Tinnitus   [] Other  Respiratory:  [] Cough  [] Shortness of breath   [] Sputum   [] Other:   Cardiac: []Chest pain   []Palpitations []Edema  []PND  [] Other:  GI:  []Abdominal pain   []Nausea  []Vomiting  []Diarrhea  [] Other:  :  [] Dysuria   []Frequency  []Hematuria  []Discharge  [] Other:  Possible Pregnancy: []Yes   []No   LMP:   Musculoskeletal:  []Back pain  []Neck pain  []Recent Injury   Skin:  []Rash  [] Itching  [] Other:  Neurologic:  [] Headache  [] Focal weakness  [] Sensory changes []Other:  Endocrine:  [] Polyuria  [] Polydipsia  [] Hair Loss  [] Other:  Lymphatic:   [] Swollen glands   Psychiatric:  As per HPI      All other systems negative except as marked or mentioned/indicated in the HPI. Madelyn Bills      PHYSICAL EXAM:  Vitals:  BP (!) 143/86   Pulse 66   Temp 97.3 °F (36.3 °C) (Oral)   Resp 18   Ht 5' (1.524 m)   Wt 82 lb (37.2 kg)   LMP  (LMP Unknown)   SpO2 100%   BMI 16.01 kg/m²      Neuro Exam:   Muscle Strength & Tone: full ROM  Gait: normal gait   Involuntary Movements: No    Mental Status Examination:    Level of consciousness:  awake   Appearance:  hospital attire  Behavior/Motor:  no abnormalities noted  Attitude toward examiner:  goo eye contact  Speech:  rapid   Mood: labile  Affect:  mood incongruent  Thought processes:  coherent   Thought content:  Suicidal Ideation:  denies suicidal ideation  Cognition:  oriented   Concentration poor  Memory intact  Insight poor   Judgement poor   Fund of Knowledge limited      DIAGNOSIS:    Alcohol abuse        LABS: REVIEWED TODAY:  Recent Labs     03/12/21 0215   WBC 9.4   HGB 12.9   *     Recent Labs     03/12/21  0215 03/13/21  1026 03/14/21  0637   * 130* 127*   K 3.9 3.5 3.7   CL 92* 96 96   CO2 25 26 24   BUN 6* 6* 6*   CREATININE 0.30* 0.50 0.35*   GLUCOSE 92 95 88     Recent Labs     03/12/21 0215   BILITOT 0.3   ALKPHOS 96   AST 31   ALT 20     Lab Results   Component Value Date    LABAMPH Neg 08/01/2014    BARBSCNU Neg 08/01/2014    LABBENZ Neg 08/01/2014    OPIATESCREENURINE Neg 08/01/2014 PHENCYCLIDINESCREENURINE Neg 08/01/2014    ETOH <10 03/12/2021     Lab Results   Component Value Date    TSH 2.560 03/12/2021     No results found for: LITHIUM  No results found for: VALPROATE, CBMZ  No results found for: LITHIUM, VALPROATE    FURTHER LABS ORDERED :      Radiology   Ct Abdomen Pelvis W Iv Contrast    Result Date: 2/7/2021  CT of the Abdomen and Pelvis were intravenous contrast medium History:  Hematochezia Technical Factors: CT imaging of the abdomen and pelvis were obtained and formatted as 5 mm contiguous axial images from the domes of the diaphragm to the symphysis pubis. Sagittal and coronal reconstructions were also obtained. Oral contrast medium:  None. Intravenous contrast medium:  Isovue-370, 100 mL. Comparison:  February 6, 2021, August 1, 2014. Findings: Lungs:  Lung bases are clear. Liver:  Normal in size, shape, and attenuation. Bile Ducts:  Normal in caliber. Gallbladder:  Contracted. Pancreas:  Normal without masses, cysts, ductal dilatation or calcification. Spleen:  Normal in size without masses or calcifications. No splenules. Kidneys:  Normal in size and enhancement. No hydronephrosis, masses, or stones. Adrenals:  Normal. Small bowel:  Normal in caliber. Appendix:  Normal. Colon:  Normal in caliber. Peritoneum:  No ascites, free air, or fluid collections. Vessels: Aorta normal in course and caliber. Diffuse aortoiliac atherosclerotic disease. Portal vein, splenic vein, superior mesenteric vein are patent. Lymph nodes:  Retroperitoneal:  No enlarged retroperitoneal lymph nodes. Mesenteric:  No enlarged mesenteric lymph nodes. Pelvic: No enlarged pelvic lymph nodes. Ureters: Normal in course and caliber. No calcifications. Bladder: Wall thickening measuring up to 9 mm. Abdominal Wall:  No hernia identified. No diastasis of rectus musculature. No edema or masses. Bones:  No bone lesions. 9 mm anterolisthesis, L4 on L5.  Progressive diffuse disc space narrowing L5-S1 to lesser extent L4-5. No post operative changes. Urinary bladder wall thickening, possibly secondary to inflammatory or infectious etiology. Grade 1/2 L4 spondylolisthesis, with degenerative change lower lumbar spine. All CT scans at this facility use dose modulation, iterative reconstruction, and/or weight based dosing when appropriate to reduce radiation dose to as low as reasonably achievable. Xr Chest Portable    Result Date: 2/7/2021  EXAMINATION: XR CHEST PORTABLE CLINICAL HISTORY: ALTERED MENTAL STATUS COMPARISONS: CHEST RADIOGRAPH AUGUST 1, 2014 FINDINGS: Osseous structures are intact. Cardiopericardial silhouette is normal. Aorta calcified. Pulmonary vasculature is normal. Lungs are clear. NO ACUTE CARDIOPULMONARY DISEASE. Us Abdomen Limited Specify Organ? Liver    Result Date: 2/8/2021  EXAMINATION: US ABDOMEN LIMITED DATE AND TIME:2/7/2021 12:00 PM CLINICAL HISTORY: Epigastric pain   elevated liver enzymes. COMPARISON: None TECHNIQUE: Gray-scale evaluation of the right upper quadrant was performed. FINDINGS:            Liver: Hepatic echogenicity is within normal limits without intrahepatic biliary dilatation. Gallbladder: Echogenic foci in the gallbladder that move and shadow consistent with gallstones. Some gallbladder sludge is present. No pericholecystic fluid. No gallbladder wall thickening. The common duct measures up to 3 mm. Pancreas: Was not optimally visualized due to bowel and gas shadowing, but the visualized portion did not demonstrate any gross pathology. Multiple gallstones.        EKG: TRACING REVIEWED    RECOMMENDATIONS    Risk Management:  routine:  no special precautions necessary    Based on my assessment today I believe that patient has capacity to make decisions regarding the treatment  Patient does not appear to be psychotic or confused  Patient denies any suicidal thoughts  Patient might benefit from assessment with lets get real  Discussed with the treating physician/ team about the patient and treatment plan  Reviewed the chart    Discussed with the patient risk, benefit, alternative and common side effects for the  proposed medication treatment. Patient is consenting to the treatment. Thanks for the consult. Please call me if needed.     Electronically signed by Phyllis Moraes MD on 3/14/2021 at 10:22 AM

## 2021-03-14 NOTE — BH NOTE
Pt seen.  D/W team  Pt was seen last month as well under similar circumstances  Clearly minimizing the alcohol problem  During assessment today, I believe that patient has capacity to make decision  Will benefit from Lets get real assessment    Full note to be completed    Electronically signed by Garland Rowe MD on 3/14/2021 at 10:27 AM

## 2021-03-14 NOTE — PROGRESS NOTES
Renal Progress Note    Assessment and Plan:    70-year-old lady with recurrent hyponatremia with urine osmolality is in the 300s before consistent with some degree of ADH secretion it is exacerbated by excess fluid/alcohol intake. In terms of concern for any malignancy, she has had CT scan of the chest abdomen pelvis as well as head that are all negative. It is odd that she has had low potassiums as well.     Plan/  1-hold potassium supplementation  2-repeat labs today including urine electrolytes and urine osmolality  3-1500 cc fluid restriction - sounds like she drinks at least 4-5 liters /day normally  4-discussed in detail issues with chronic alcohol use with her  5-1 dose of Samsca just to make sure sodium level increases in absolute he would be fine for discharge tomorrow      Patient Active Problem List:     Hyponatremia     HTN (hypertension)     Hypokalemia     Alcohol use     Elevated liver enzymes     Encephalopathy acute     Metabolic encephalopathy     Repeated falls     Hypomagnesemia     Severe malnutrition (HCC)     Poor compliance with medication     History of malignant neoplasm of ovary     Tobacco user     Dementia ([facility] [address]      Subjective:   Admit Date: 3/11/2021    Interval History: Patient with sodium level stable around 127. Still low. She is on fluid restriction now.       Medications:   Scheduled Meds:   thiamine  100 mg Oral Daily    folic acid  1 mg Oral Daily    sodium chloride flush  10 mL Intravenous 2 times per day    amLODIPine  5 mg Oral Daily    sodium chloride flush  10 mL Intravenous 2 times per day    enoxaparin  30 mg Subcutaneous Daily    insulin lispro  0-6 Units Subcutaneous TID WC    insulin lispro  0-3 Units Subcutaneous Nightly     Continuous Infusions:   dextrose         CBC:   Recent Labs     03/12/21  0215   WBC 9.4   HGB 12.9   *     CMP:    Recent Labs     03/12/21  0215 03/13/21  1026 03/14/21  0637   * 130* 127*   K 3.9 3.5 3.7   CL 92* 96 96 CO2 25 26 24   BUN 6* 6* 6*   CREATININE 0.30* 0.50 0.35*   GLUCOSE 92 95 88   CALCIUM 9.2 8.9 8.6   LABGLOM >60.0 >60.0 >60.0     Troponin: No results for input(s): TROPONINI in the last 72 hours. BNP: No results for input(s): BNP in the last 72 hours. INR: No results for input(s): INR in the last 72 hours. Lipids: No results for input(s): CHOL, LDLDIRECT, TRIG, HDL, AMYLASE, LIPASE in the last 72 hours. Liver:   Recent Labs     03/12/21  0215   AST 31   ALT 20   ALKPHOS 96   PROT 7.4   LABALBU 4.0   BILITOT 0.3     Iron:  No results for input(s): IRONS, FERRITIN in the last 72 hours. Invalid input(s): LABIRONS  Urinalysis: No results for input(s): UA in the last 72 hours. Objective:   Vitals: BP (!) 143/86   Pulse 66   Temp 97.3 °F (36.3 °C) (Oral)   Resp 18   Ht 5' (1.524 m)   Wt 82 lb (37.2 kg)   LMP  (LMP Unknown)   SpO2 100%   BMI 16.01 kg/m²    Wt Readings from Last 3 Encounters:   03/11/21 82 lb (37.2 kg)   03/01/21 78 lb 12.8 oz (35.7 kg)   02/10/21 83 lb 11.2 oz (38 kg)      24HR INTAKE/OUTPUT:      Intake/Output Summary (Last 24 hours) at 3/14/2021 0948  Last data filed at 3/14/2021 0618  Gross per 24 hour   Intake 730 ml   Output 600 ml   Net 130 ml       Constitutional:  Alert, awake, no apparent distress   Skin:normal, no rash  HEENT:sclera anicteric.   Head atraumatic normocephalic  Neck:supple with no thyromegally  Cardiovascular:  S1, S2 without m/r/g   Respiratory:  CTA B without w/r/r   Abdomen: +bs, soft, nt  Ext: no LE edema  Musculoskeletal:Intact  Neuro:Alert and oriented with no deficit      Electronically signed by Elliot Khan MD on 3/14/2021 at 9:48 AM

## 2021-03-14 NOTE — PROGRESS NOTES
Hospitalist Progress Note      PCP: Jerry Wilder MD    Date of Admission: 3/11/2021    Chief Complaint:    Chief Complaint   Patient presents with    Fall     Subjective:  Patient states she drank a lot of water last night because she felt like she needed to. 12 point ROS negative other than mentioned above     Medications:  Reviewed    Infusion Medications    dextrose       Scheduled Medications    thiamine  100 mg Oral Daily    folic acid  1 mg Oral Daily    sodium chloride flush  10 mL Intravenous 2 times per day    amLODIPine  5 mg Oral Daily    sodium chloride flush  10 mL Intravenous 2 times per day    enoxaparin  30 mg Subcutaneous Daily    insulin lispro  0-6 Units Subcutaneous TID WC    insulin lispro  0-3 Units Subcutaneous Nightly     PRN Meds: sodium chloride flush, LORazepam **OR** LORazepam **OR** LORazepam **OR** LORazepam **OR** LORazepam **OR** LORazepam **OR** LORazepam **OR** LORazepam, sodium chloride flush, promethazine **OR** ondansetron, polyethylene glycol, acetaminophen **OR** acetaminophen, glucose, dextrose, glucagon (rDNA), dextrose      Intake/Output Summary (Last 24 hours) at 3/14/2021 0938  Last data filed at 3/14/2021 0618  Gross per 24 hour   Intake 730 ml   Output 600 ml   Net 130 ml     Exam:    BP (!) 143/86   Pulse 66   Temp 97.3 °F (36.3 °C) (Oral)   Resp 18   Ht 5' (1.524 m)   Wt 82 lb (37.2 kg)   LMP  (LMP Unknown)   SpO2 100%   BMI 16.01 kg/m²     General appearance: No apparent distress, appears stated age and cooperative. HEENT:  Conjunctivae/corneas clear. Neck:  Trachea midline. Respiratory:  Normal respiratory effort. Clear to auscultation  Cardiovascular: Regular rate and rhythm   Abdomen: Soft, non-tender, non-distended with normal bowel sounds. Musculoskeletal: No clubbing, cyanosis or edema bilaterally.    Neuro: Non Focal.  Capillary Refill: Brisk,< 3 seconds   Peripheral Pulses: +2 palpable, equal bilaterally     Labs:   Recent Labs 03/12/21 0215   WBC 9.4   HGB 12.9   HCT 38.0   *     Recent Labs     03/12/21 0215 03/13/21  1026 03/14/21  0637   * 130* 127*   K 3.9 3.5 3.7   CL 92* 96 96   CO2 25 26 24   BUN 6* 6* 6*   CREATININE 0.30* 0.50 0.35*   CALCIUM 9.2 8.9 8.6     Recent Labs     03/12/21 0215   AST 31   ALT 20   BILITOT 0.3   ALKPHOS 96     No results for input(s): INR in the last 72 hours. Recent Labs     03/12/21 0215   CKTOTAL 344*       Urinalysis:      Lab Results   Component Value Date    NITRU Negative 03/12/2021    BLOODU Negative 03/12/2021    SPECGRAV 1.006 03/12/2021    GLUCOSEU Negative 03/12/2021       Radiology:  RADIOLOGY REPORT   Final Result      CT HEAD WO CONTRAST   Final Result      CT CERVICAL SPINE WO CONTRAST   Final Result      No fracture. Moderate to marked degenerative change cervical spine. All CT scans at this facility use dose modulation, iterative reconstruction, and/or weight based dosing when appropriate to reduce radiation dose to as low as reasonably achievable.                   Assessment/Plan:    #Hyponatremia; beer potomania    - Discussed with patient and she states she cannot quit drinking but will try to cut back    - recommended to her that if she is thirsty to drink gatorade rather than free water     - nephrology is helping manage; worsened slightly last night       - Due to the patients sodium worsening overnight she will require being admitted for more than 2 midnights     #HTN    - Continue norvasc; increased dose yesterday    #Alcoholism; repeated falls    - Not withdrawing at this time     - start thiamine and b12; add on b12 to morning labs    - Monitor ciwa    - likely the source of her falls; she denies LOC, palpitations, light headedness; states the falls are usually her tripping    #Flight of thoughts    - Daughter would like patient placed but the patient insists she is at baseline and is ok to go home; will consult psych for capacity to make decisions; if she is capable of making her own decisions then may need to d/c home    Active Hospital Problems    Diagnosis Date Noted    Dementia [facility]) [F03.90] 03/12/2021    Poor compliance with medication [Z91.14] 03/12/2021    Severe malnutrition ([address] [E43] 53/42/3169    Metabolic encephalopathy [G54.60] 02/10/2021    HTN (hypertension) [I10] 02/07/2021    Hyponatremia [E87.1] 02/06/2021      Additional work up or/and treatment plan may be added today or then after based on clinical progression. I am managing a portion of pt care. Some medical issues are handled by other specialists. Additional work up and treatment should be done in out pt setting by pt PCP and other out pt providers. In addition to examining and evaluating pt, I spent additional time explaining care, normal and abnormal findings, and treatment plan. All of pt questions were answered. Counseling, diet and education were  provided. Case will be discussed with nursing staff when appropriate. Family will be updated if and when appropriate.       Diet: DIET GENERAL;    Code Status: Full Code    PT/OT Eval     Electronically signed by Matilda Vyas MD on 3/14/2021 at 9:38 AM

## 2021-03-15 VITALS
HEIGHT: 60 IN | SYSTOLIC BLOOD PRESSURE: 125 MMHG | RESPIRATION RATE: 18 BRPM | WEIGHT: 82 LBS | TEMPERATURE: 98.6 F | OXYGEN SATURATION: 99 % | HEART RATE: 86 BPM | BODY MASS INDEX: 16.1 KG/M2 | DIASTOLIC BLOOD PRESSURE: 81 MMHG

## 2021-03-15 LAB
ANION GAP SERPL CALCULATED.3IONS-SCNC: 7 MEQ/L (ref 9–15)
BUN BLDV-MCNC: 10 MG/DL (ref 8–23)
CALCIUM SERPL-MCNC: 9.1 MG/DL (ref 8.5–9.9)
CHLORIDE BLD-SCNC: 104 MEQ/L (ref 95–107)
CO2: 25 MEQ/L (ref 20–31)
CREAT SERPL-MCNC: 0.35 MG/DL (ref 0.5–0.9)
GFR AFRICAN AMERICAN: >60
GFR NON-AFRICAN AMERICAN: >60
GLUCOSE BLD-MCNC: 137 MG/DL (ref 60–115)
GLUCOSE BLD-MCNC: 187 MG/DL (ref 60–115)
GLUCOSE BLD-MCNC: 85 MG/DL (ref 70–99)
GLUCOSE BLD-MCNC: 91 MG/DL (ref 60–115)
PERFORMED ON: ABNORMAL
PERFORMED ON: ABNORMAL
PERFORMED ON: NORMAL
POTASSIUM SERPL-SCNC: 3.5 MEQ/L (ref 3.4–4.9)
SODIUM BLD-SCNC: 136 MEQ/L (ref 135–144)

## 2021-03-15 PROCEDURE — 6370000000 HC RX 637 (ALT 250 FOR IP): Performed by: INTERNAL MEDICINE

## 2021-03-15 PROCEDURE — 2580000003 HC RX 258: Performed by: INTERNAL MEDICINE

## 2021-03-15 PROCEDURE — 6360000002 HC RX W HCPCS: Performed by: INTERNAL MEDICINE

## 2021-03-15 PROCEDURE — 99231 SBSQ HOSP IP/OBS SF/LOW 25: CPT | Performed by: PHYSICAL MEDICINE & REHABILITATION

## 2021-03-15 PROCEDURE — 80048 BASIC METABOLIC PNL TOTAL CA: CPT

## 2021-03-15 PROCEDURE — 36415 COLL VENOUS BLD VENIPUNCTURE: CPT

## 2021-03-15 PROCEDURE — 99232 SBSQ HOSP IP/OBS MODERATE 35: CPT | Performed by: PSYCHIATRY & NEUROLOGY

## 2021-03-15 RX ORDER — LANOLIN ALCOHOL/MO/W.PET/CERES
100 CREAM (GRAM) TOPICAL DAILY
Qty: 30 TABLET | Refills: 3 | Status: SHIPPED | OUTPATIENT
Start: 2021-03-16

## 2021-03-15 RX ORDER — AMLODIPINE BESYLATE 5 MG/1
5 TABLET ORAL DAILY
Qty: 30 TABLET | Refills: 3 | Status: SHIPPED | OUTPATIENT
Start: 2021-03-16

## 2021-03-15 RX ORDER — FOLIC ACID 1 MG/1
1 TABLET ORAL DAILY
Qty: 30 TABLET | Refills: 3 | Status: SHIPPED | OUTPATIENT
Start: 2021-03-16

## 2021-03-15 RX ADMIN — LORAZEPAM 1 MG: 1 TABLET ORAL at 02:14

## 2021-03-15 RX ADMIN — ACETAMINOPHEN 650 MG: 325 TABLET ORAL at 02:14

## 2021-03-15 RX ADMIN — AMLODIPINE BESYLATE 5 MG: 5 TABLET ORAL at 07:47

## 2021-03-15 RX ADMIN — FOLIC ACID 1 MG: 1 TABLET ORAL at 07:48

## 2021-03-15 RX ADMIN — ACETAMINOPHEN 650 MG: 325 TABLET ORAL at 07:47

## 2021-03-15 RX ADMIN — Medication 100 MG: at 07:48

## 2021-03-15 RX ADMIN — ENOXAPARIN SODIUM 30 MG: 30 INJECTION SUBCUTANEOUS at 07:49

## 2021-03-15 RX ADMIN — SODIUM CHLORIDE, PRESERVATIVE FREE 10 ML: 5 INJECTION INTRAVENOUS at 07:48

## 2021-03-15 ASSESSMENT — PAIN SCALES - GENERAL
PAINLEVEL_OUTOF10: 0
PAINLEVEL_OUTOF10: 4
PAINLEVEL_OUTOF10: 0
PAINLEVEL_OUTOF10: 5

## 2021-03-15 ASSESSMENT — PAIN DESCRIPTION - LOCATION
LOCATION: GENERALIZED
LOCATION: GENERALIZED;HEAD

## 2021-03-15 ASSESSMENT — PAIN DESCRIPTION - PAIN TYPE
TYPE: ACUTE PAIN;CHRONIC PAIN

## 2021-03-15 ASSESSMENT — PAIN DESCRIPTION - DESCRIPTORS
DESCRIPTORS: HEADACHE
DESCRIPTORS: HEADACHE

## 2021-03-15 ASSESSMENT — PAIN DESCRIPTION - FREQUENCY: FREQUENCY: INTERMITTENT
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
## 2021-03-15 NOTE — DISCHARGE INSTR - COC
100%   BMI 16.01 kg/m²     Last documented pain score (0-10 scale): Pain Level: 0  Last Weight:   Wt Readings from Last 1 Encounters:   21 82 lb (37.2 kg)     Mental Status:  {IP PT MENTAL STATUS:}    IV Access:  { LENORA IV ACCESS:988497039}    Nursing Mobility/ADLs:  Walking   {[facility] DME MZX}  Transfer  {[facility] DME WRGE:615276023}  Bathing  {[facility] DME VEBY:200325575}  Dressing  {[facility] DME TJJT:017628389}  Toileting  {[facility] DME ZLEK:644178586}  Feeding  {[facility] DME OAHV:640256992}  Med Admin  {[facility] DME ZWCF:534258299}  Med Delivery   { LENORA MED Delivery:095393255}    Wound Care Documentation and Therapy:  Wound 21 Arm Distal;Left;Lower;Dorsal (Active)   Dressing Status Other (Comment) 21 0800   Dressing/Treatment Open to air 21 0800   Drainage Amount None 21 0800   Odor None 21 0800   Number of days: 14       Wound 21 Tibial Distal;Left;Dorsal (Active)   Dressing Status Other (Comment) 21 0800   Dressing/Treatment Open to air 21 0800   Drainage Amount None 21 0800   Odor None 21 0800   Number of days: 14        Elimination:  Continence:   · Bowel: {YES / VV:74893}  · Bladder: {YES / KV:80961}  Urinary Catheter: {Urinary Catheter:276797857}   Colostomy/Ileostomy/Ileal Conduit: {YES / NK:34353}       Date of Last BM: ***    Intake/Output Summary (Last 24 hours) at 3/15/2021 1625  Last data filed at 3/15/2021 1140  Gross per 24 hour   Intake 960 ml   Output 900 ml   Net 60 ml     I/O last 3 completed shifts:   In: 12 [P.O.:960]  Out: 900 [Urine:900]    Safety Concerns:     [address] LENORA Safety Concerns:123022278}    Impairments/Disabilities:      [address] LENORA Impairments/Disabilities:144723636}    Nutrition Therapy:  Current Nutrition Therapy:   [address] COOLEY Diet List:823366823}    Routes of Feeding: {[facility] DME Other Feedings:073455039}  Liquids: {Slp liquid thickness:67161}  Daily Fluid Restriction: {[facility]P DME Yes amt example:952822359}  Last Modified Barium Swallow

## 2021-03-15 NOTE — PROGRESS NOTES
Patients family never returned a phone call. Patient unsuccessful at getting in touch with someone as well as I. Plan taxi voucher. Reviewed discharge instructions and follow up care. Patient aware of call ing Dr. Stephen Marquez  for both follow up and if any questions or concerns. No acute distress at this time. Will plan on discharge as ordered when taxi arrives. No questions voiced. Stable at this time. Plan discharge via wc to front lobby/home on arrival of taxi.

## 2021-03-15 NOTE — PLAN OF CARE
Nutrition Problem #1: Severe malnutrition, In context of social or environmental circumstances  Intervention: Food and/or Nutrient Delivery: Continue Current Diet, Start Oral Nutrition Supplement(Continue General diet, start high calorie ONS TID)  Nutritional Goals: po intake > 75% of meals and supplements, wt gain > 82 lb

## 2021-03-15 NOTE — PROGRESS NOTES
Subjective: The patient complains of severe acute on chronic progressive fatigue and ataxia partially relieved by rest, PT, OT and meds and exacerbated by exertion and recent illness. I am concerned about patients medical complexities. She is very poor awareness of her deficits-psychiatry consult is pending she is at least submanic if not manic. Reviewed recent nursing note and discussed current status and planned care with acute care providers, \" A&O X 3, DOES HAVE SCATTERED THOUGHTS AT TIMES. DISCUSSED DC PLAN AND PT WISHES TO GO HOME TODAY. PT/OT EVAL DONE AND PT DID WELL WITHOUT ANY ASSISTIVE DEVICE. WAS NOT ACCEPTED TO [facility]. PT DENIES NEED FOR HHC. I THEN CALLED PTS DTR, VICKY,  TO UPDATE HER ON DC PLAN, SINCE VICKY WAS WANTING PT TO GO TO REHAB AND SPOKE WITH ASHLEY SAMANO. VICKY VOICED HER FRUSTRATION, STATING \"YOU GUYS DON'T CARE\", I DID ADVISE VICKY THAT HER MOTHER IS A&O X 3 AND IS MAKING HER OWN CHOICES. ATTEMPTED TO GIVE HER RESOURCES AND SHE STATED \"NO ONE CARES SO WHY SHOULD I DO ANYTHING, I'M DONE\". DC PLAN WILL BE HOME WITH BOYFRIENBILLY, RN UPDATED. \".    ROS x10: The patient also complains of severely impaired mobility and activities of daily living. Otherwise no new problems with vision, hearing, nose, mouth, throat, dermal, cardiovascular, GI, , pulmonary, musculoskeletal, psychiatric or neurological. See Rehab consult on Rehab chart . Vital signs:  BP (!) 155/75   Pulse 75   Temp 98.4 °F (36.9 °C) (Oral)   Resp 17   Ht 5' (1.524 m)   Wt 82 lb (37.2 kg)   LMP  (LMP Unknown)   SpO2 100%   BMI 16.01 kg/m²   I/O:   PO/Intake:    fair PO intake,       Bowel/Bladder:   continent,    General:  Patient is well developed, adequately nourished, non-obese and     well kempt. HEENT:    PERRLA, hearing intact to loud voice, external inspection of ear     and nose benign.   Inspection of lips, tongue and gums benign  Musculoskeletal: No significant change in strength or tone. All joints stable. Inspection and palpation of digits and nails show no clubbing,       cyanosis or inflammatory conditions. Neuro/Psychiatric: Affect: flat-  Alert and oriented to self and     Situation with no needed cues. No significant change in deep tendon reflexes or     Sensation-tangential and verbose lack of insight into her deficits manic/submanic  Lungs:  Diminished, CTA-B. Respiration effort is normal at rest.  NUR  Heart:   S1 = S2,   RRR. No loud murmurs. Abdomen:  Soft, non-tender, no enlargement of liver or spleen. Extremities:  No significant lower extremity edema or tenderness. Skin:    BUE bruises dt blood draws, no visualized or palpated problems. Rehabilitation:  Physical therapy: FIMS:  Bed Mobility:      Transfers: Sit to Stand: Supervision  Stand to sit: Supervision  Bed to Chair: Supervision, Ambulation 1  Device: No Device  Assistance: Supervision  Quality of Gait: L foot with decreased DF in terminal swing  Distance: 80ft  Comments: with multiple turns,      FIMS:  ,  , Assessment: Pt is indep/supervision with functional mobility. Pt demonstrates safe functional mobility for home going with family support    Patient has significant gait instability  Occupational therapy: FIMS:   ,  , Assessment: Pt is a 61 y.o. female s/p fall. Pt supervised in room. Pt required cues to redirect to attend to tasks.     Speech therapy: FIMS:      Patient has significant cognitive deficits      Lab/X-ray studies reviewed, analyzed and discussed with patient and staff:   Recent Results (from the past 24 hour(s))   POCT Glucose    Collection Time: 03/14/21 11:02 AM   Result Value Ref Range    POC Glucose 73 60 - 115 mg/dl    Performed on ACCU-CHEK    POCT Glucose    Collection Time: 03/14/21  4:01 PM   Result Value Ref Range    POC Glucose 99 60 - 115 mg/dl    Performed on ACCU-CHEK    POCT Glucose    Collection Time: 03/14/21  8:38 PM   Result Value Ref Range    POC Glucose 184 (H) 60 - 115 mg/dl    Performed on ACCU-CHEK    Basic Metabolic Panel    Collection Time: 03/15/21  5:33 AM   Result Value Ref Range    Sodium 136 135 - 144 mEq/L    Potassium 3.5 3.4 - 4.9 mEq/L    Chloride 104 95 - 107 mEq/L    CO2 25 20 - 31 mEq/L    Anion Gap 7 (L) 9 - 15 mEq/L    Glucose 85 70 - 99 mg/dL    BUN 10 8 - 23 mg/dL    CREATININE 0.35 (L) 0.50 - 0.90 mg/dL    GFR Non-African American >60.0 >60    GFR  >60.0 >60    Calcium 9.1 8.5 - 9.9 mg/dL   POCT Glucose    Collection Time: 03/15/21  6:40 AM   Result Value Ref Range    POC Glucose 91 60 - 115 mg/dl    Performed on ACCU-CHEK        Previous extensive, complex labs, notes and diagnostics reviewed and analyzed. ALLERGIES:    Allergies as of 03/11/2021 - Review Complete 03/11/2021   Allergen Reaction Noted    Zantac [ranitidine hcl]  06/02/2018      (please also verify by checking [facility])     Complex Physical Medicine & Rehab Issues Assess & Plan:   1. 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 Functional and medical status reassessed regarding patients ability to participate in therapies and patient found to be able to participate in  acute intensive comprehensive inpatient rehabilitation program including PT/OT to improve balance, ambulation, ADLs, and to improve the P/AROM. It is my opinion that they will be able to tolerate 3 hours of therapy a day and benefit from it at an acute level. However she is so tangential I doubt that she would participate in a meaningful way. If she decides to come to rehab I am okay with that because she does have needs regarding her gait instability and cognition however she would need to have improved insight. 2. Bowel constipation and Bladder dysfunction overactive bladder:  frequent toileting, ambulate to bathroom with assistance, check post void residuals.   Check for C.difficile x1 if >2 loose stools in 24 hours, continue bowel & bladder program.  Monitor for UTI symptoms including lethargy and confusion  3. Moderate mid and low back pain and generalized OA pain: reassess pain every shift and prior to and after each therapy session, give prn  Tylenol, modalities prn in therapy, consider Lidoderm, K-pad prn.   4. Skin breakdown risk:  continue pressure relief program.  Daily skin exams and reports from nursing. 5. Severe fatigue due to immobility and nutritional deficits: Add vitamin B12 vitamin D and CoQ10 titrate dosing and add protein supplementation with low carb content. 6. Complex discharge planning:   Patient okay for acute rehab if she or her insight and participation motivation improved. Because she is for the most part refusing therapy I feel that she may be noncompliant and okay to go home with family support. Complex Active General Medical Issues that complicate care Assess & Plan:     1. Active Problems:    Hyponatremia    HTN (hypertension)    Metabolic encephalopathy    Severe malnutrition (HCC)    Poor compliance with medication    Dementia (HCC)  Resolved Problems:    * No resolved hospital problems.  Minnie Prado D.O., PM&R     Attending    [facility]

## 2021-03-15 NOTE — DISCHARGE SUMMARY
Hospital Medicine Discharge Summary    Melquiades Anen  :  1960  MRN:  [id number]    Admit date:  3/11/2021  Discharge date:  3/15/2021    Admitting Physician:  No admitting provider for patient encounter. Primary Care Physician:  Rodolfo Landis MD      Discharge Diagnoses:    Hyponatremia    Chief Complaint   Patient presents with   [facility] Course:   Patient who drinks beers daily presents after a fall and found to have hyponatremia. She denies palpitations, LOC, headache, chest pain; states the falls are mechanical and it was from tripping. Had PT/OT see the patient who recommended outpatient physical therapy. Hyponatremia was fixed and patient was advised strongly to quit drinking beers and she indicated she would cut back. Patients daughter was concerned and wanted her placed in a SNF; the patient was adamant that she would only go home - psych was consulted for capacity and deemed the patient capable of making her own decisions. Exam on discharge:   BP (!) 155/75   Pulse 76   Temp 98.4 °F (36.9 °C) (Oral)   Resp 17   Ht 5' (1.524 m)   Wt 82 lb (37.2 kg)   LMP  (LMP Unknown)   SpO2 100%   BMI 16.01 kg/m²   General appearance: No apparent distress, appears stated age and cooperative. HEENT:  Conjunctivae/corneas clear. Neck: Trachea midline. Respiratory:  Normal respiratory effort. Clear to auscultation  Cardiovascular: Regular rate and rhythm   Abdomen: Soft, non-tender, non-distended with normal bowel sounds. Musculoskeletal: No clubbing, cyanosis or edema bilaterally.     Neuro: Non Focal.   Capillary Refill: Brisk,< 3 seconds   Peripheral Pulses: +2 palpable, equal bilaterally     Patient was seen by the following consultants while admitted to [facility]:   Consults:  IP CONSULT TO REHAB/TCU ADMISSION COORDINATOR  IP CONSULT TO NEPHROLOGY  IP CONSULT TO PSYCHIATRY  IP CONSULT TO SOCIAL WORK    Significant Diagnostic Studies:    Refer to chart Please refer to chart if no studies are shown here    Ct Head Wo Contrast    Result Date: 3/12/2021  CT HEAD WO CONTRAST CLINICAL HISTORY:  fall Comparison: #[id number] TECHNIQUE: Multiple unenhanced serial axial images of the brain from the vertex of the skull to the base of the skull were performed. FINDINGS: The ventricles are dilated. Unchanged size configuration. .  No mass. No midline shift. The cisterns are patent. There are white matter and periventricular changes most likely consistent with chronic small vessel ischemic disease. The visualized osseous structures are unremarkable. The visualized portion of the paranasal sinuses, and mastoids are unremarkable. There is an area of soft tissue swelling hematoma overlying the right posterior parietal occipital region. Correlate with physical exam IMPRESSION NO ACUTE INTRA-AXIAL OR EXTRA-AXIAL FINDINGS. All CT scans at this facility use dose modulation, iterative reconstruction, and/or weight based dosing when appropriate to reduce radiation dose to as low as reasonably achievable. Ct Cervical Spine Wo Contrast    Result Date: 3/12/2021  CT cervical spine without intravenous contrast medium. HISTORY: Fall. Hit posterior head. Confusion. Altered mental status. TECHNICAL FACTORS: CT cervical spine obtained and formatted as 2.5 mm contiguous axial images from skull base to the level of. Sagittal and coronal reconstructions were obtained during postprocessing. No contrast medium was utilized. COMPARISON: CT cervical spine, December 13, 2019 FINDINGS: Cervical vertebral bodies are normal in height and alignment Atlantooccipital articulation maintained. Atlantoaxial interval preserved. No foraminal narrowing, right C3-4, bilateral C4-5, bilateral C5-6. Diffuse disc space narrowing C3-4, C5-6. Posterior osteophytes C4 and C5. No fractures, dislocations, bone lesions. Limited imaging lung apices without anomaly. Carotid arteries and soft tissues are without anomaly. No fracture. Moderate to marked degenerative change cervical spine. All CT scans at this facility use dose modulation, iterative reconstruction, and/or weight based dosing when appropriate to reduce radiation dose to as low as reasonably achievable. Discharge Medications:       Laura Roa   Home Medication Instructions XIL:[id number]    Printed on:03/15/21 1125   Medication Information                      amLODIPine (NORVASC) 5 MG tablet  Take 1 tablet by mouth daily             ammonium lactate (LAC-HYDRIN) 12 % lotion  Apply topically as needed for Dry Skin Apply topically as needed. folic acid (FOLVITE) 1 MG tablet  Take 1 tablet by mouth daily             potassium chloride (KLOR-CON M) 20 MEQ extended release tablet  Take 1 tablet by mouth daily             thiamine 100 MG tablet  Take 1 tablet by mouth daily             Vitamin D (CHOLECALCIFEROL) 50 MCG (2000 UT) TABS tablet  Take 1 tablet by mouth daily                 Disposition:   If discharged to Home, Any [place] [address] needs that were indicated and/or required as been addressed and set up by Social Work. Condition at discharge: good     Activity: activity as tolerated    Total time taken for discharging this patient: 40 minutes. Greater than 70% of time was spent focused exclusively on this patient. Time was taken to review chart, discuss plans with consultants, reconciling medications, discussing plan answering questions with patient.      Lisa Tolentino  3/15/2021, 11:25 AM  ----------------------------------------------------------------------------------------------------------------------    Collin Woody

## 2021-03-15 NOTE — PROGRESS NOTES
Late Entry 2:00PM  Patient was discharged earlier today by Dr. Asif Montenegro, however, patient has been unsuccessful in reaching any of her family to pick her up. Several calls have been placed by this RN to Ashlee Tate, and Moe Dave. Several messages have been left  By the patient and this RN. Patient voiced she is able to get into her home and lives \"super close\". Plan is taxi voucher if no call are returned by 5:00PM. Patient verbalized understanding g and is [place] \" having a taxi voucher. Will continue to monitor and note changes.

## 2021-03-15 NOTE — PROGRESS NOTES
amLODIPine (NORVASC) tablet 5 mg, 5 mg, Oral, Daily, Cheryl Deal MD, 5 mg at 03/15/21 0747    sodium chloride flush 0.9 % injection 10 mL, 10 mL, Intravenous, 2 times per day, Aisha White MD, 10 mL at 03/13/21 5161    sodium chloride flush 0.9 % injection 10 mL, 10 mL, Intravenous, PRN, Aisha White MD    promethazine (PHENERGAN) tablet 12.5 mg, 12.5 mg, Oral, Q6H PRN **OR** ondansetron (ZOFRAN) injection 4 mg, 4 mg, Intravenous, Q6H PRN, Aisha White MD    polyethylene glycol (GLYCOLAX) packet 17 g, 17 g, Oral, Daily PRN, Aisha White MD    acetaminophen (TYLENOL) tablet 650 mg, 650 mg, Oral, Q6H PRN, 650 mg at 03/15/21 0747 **OR** acetaminophen (TYLENOL) suppository 650 mg, 650 mg, Rectal, Q6H PRN, Aisha White MD    enoxaparin (LOVENOX) injection 30 mg, 30 mg, Subcutaneous, Daily, Aisha White MD, 30 mg at 03/15/21 0749    insulin lispro (HUMALOG) injection vial 0-6 Units, 0-6 Units, Subcutaneous, TID WC, Aisha White MD    insulin lispro (HUMALOG) injection vial 0-3 Units, 0-3 Units, Subcutaneous, Nightly, Aisha White MD    glucose (GLUTOSE) 40 % oral gel 15 g, 15 g, Oral, PRN, Aisha White MD    dextrose 50 % IV solution, 12.5 g, Intravenous, PRN, Aisha White MD    glucagon (rDNA) injection 1 mg, 1 mg, Intramuscular, PRN, Aisha White MD    dextrose 5 % solution, 100 mL/hr, Intravenous, PRN, Aisha White MD      Examination:  BP (!) 155/75   Pulse 76   Temp 98.4 °F (36.9 °C) (Oral)   Resp 17   Ht 5' (1.524 m)   Wt 82 lb (37.2 kg)   LMP  (LMP Unknown)   SpO2 100%   BMI 16.01 kg/m²   Gait - steady  Medication side effects(SE): no    Mental Status Examination:    Level of consciousness:  within normal limits   Appearance:  fair grooming and fair hygiene  Behavior/Motor:  psychomotor retardation  Attitude toward examiner:  cooperative  Speech:  normal   Mood: anxious  Affect:  mood congruent  Thought processes:  goal directed   Thought content:  Suicidal Ideation:  denies suicidal ideation  Cognition:  oriented to person, place, and time   Concentration distractible  Insight fair   Judgement fair     ASSESSMENT:   Patient symptoms are:  [] Well controlled  [] Improving  [] Worsening  [] No change      Diagnosis:     Alcohol abuse    LABS:    No results for input(s): WBC, HGB, PLT in the last 72 hours. Recent Labs     03/13/21  1026 03/14/21  0637 03/15/21  0533   * 127* 136   K 3.5 3.7 3.5   CL 96 96 104   CO2 26 24 25   BUN 6* 6* 10   CREATININE 0.50 0.35* 0.35*   GLUCOSE 95 88 85     No results for input(s): BILITOT, ALKPHOS, AST, ALT in the last 72 hours. Lab Results   Component Value Date    LABAMPH Neg 08/01/2014    BARBSCNU Neg 08/01/2014    LABBENZ Neg 08/01/2014    OPIATESCREENURINE Neg 08/01/2014    PHENCYCLIDINESCREENURINE Neg 08/01/2014    ETOH <10 03/12/2021     Lab Results   Component Value Date    TSH 2.560 03/12/2021     No results found for: LITHIUM  No results found for: VALPROATE, CBMZ    RISK ASSESSMENT: Low suicide risk, high risk of alcohol relapse    Treatment Plan:  Reviewed current Medications with the patient.    Referral to lets get real  Patient not made any psychiatric admission  Can be discharged when medically stable        Electronically signed by Greg Tate MD on 3/15/2021 at 3:34 PM

## 2021-03-24 ENCOUNTER — HOSPITAL ENCOUNTER (EMERGENCY)
Age: 61
Discharge: ANOTHER HOSPITAL | End: 2021-03-25
Payer: COMMERCIAL

## 2021-03-24 DIAGNOSIS — F31.9 BIPOLAR 1 DISORDER (HCC): ICD-10-CM

## 2021-03-24 DIAGNOSIS — E87.1 HYPONATREMIA: ICD-10-CM

## 2021-03-24 DIAGNOSIS — E87.6 HYPOKALEMIA: ICD-10-CM

## 2021-03-24 DIAGNOSIS — F39 MOOD DISORDER (HCC): Primary | ICD-10-CM

## 2021-03-24 LAB
ACETAMINOPHEN LEVEL: <5 UG/ML (ref 10–30)
ALBUMIN SERPL-MCNC: 3.9 G/DL (ref 3.5–4.6)
ALP BLD-CCNC: 91 U/L (ref 40–130)
ALT SERPL-CCNC: 15 U/L (ref 0–33)
AMPHETAMINE SCREEN, URINE: NORMAL
ANION GAP SERPL CALCULATED.3IONS-SCNC: 9 MEQ/L (ref 9–15)
AST SERPL-CCNC: 22 U/L (ref 0–35)
BARBITURATE SCREEN URINE: NORMAL
BASOPHILS ABSOLUTE: 0 K/UL (ref 0–0.2)
BASOPHILS RELATIVE PERCENT: 0.6 %
BENZODIAZEPINE SCREEN, URINE: NORMAL
BILIRUB SERPL-MCNC: 0.3 MG/DL (ref 0.2–0.7)
BILIRUBIN URINE: NEGATIVE
BLOOD, URINE: NEGATIVE
BUN BLDV-MCNC: 7 MG/DL (ref 8–23)
CALCIUM SERPL-MCNC: 9.2 MG/DL (ref 8.5–9.9)
CANNABINOID SCREEN URINE: NORMAL
CHLORIDE BLD-SCNC: 91 MEQ/L (ref 95–107)
CHOLESTEROL, TOTAL: 193 MG/DL (ref 0–199)
CLARITY: CLEAR
CO2: 24 MEQ/L (ref 20–31)
COCAINE METABOLITE SCREEN URINE: NORMAL
COLOR: YELLOW
CREAT SERPL-MCNC: 0.31 MG/DL (ref 0.5–0.9)
EKG ATRIAL RATE: 69 BPM
EKG P AXIS: 72 DEGREES
EKG P-R INTERVAL: 166 MS
EKG Q-T INTERVAL: 402 MS
EKG QRS DURATION: 62 MS
EKG QTC CALCULATION (BAZETT): 430 MS
EKG R AXIS: 63 DEGREES
EKG T AXIS: 60 DEGREES
EKG VENTRICULAR RATE: 69 BPM
EOSINOPHILS ABSOLUTE: 0 K/UL (ref 0–0.7)
EOSINOPHILS RELATIVE PERCENT: 0.5 %
ETHANOL PERCENT: NORMAL G/DL
ETHANOL: <10 MG/DL (ref 0–0.08)
GFR AFRICAN AMERICAN: >60
GFR NON-AFRICAN AMERICAN: >60
GLOBULIN: 3.6 G/DL (ref 2.3–3.5)
GLUCOSE BLD-MCNC: 89 MG/DL (ref 70–99)
GLUCOSE URINE: NEGATIVE MG/DL
HCT VFR BLD CALC: 36.4 % (ref 37–47)
HDLC SERPL-MCNC: 113 MG/DL (ref 40–59)
HEMOGLOBIN: 12.8 G/DL (ref 12–16)
KETONES, URINE: NEGATIVE MG/DL
LDL CHOLESTEROL CALCULATED: 68 MG/DL (ref 0–129)
LEUKOCYTE ESTERASE, URINE: NEGATIVE
LYMPHOCYTES ABSOLUTE: 1.8 K/UL (ref 1–4.8)
LYMPHOCYTES RELATIVE PERCENT: 31.5 %
Lab: NORMAL
MAGNESIUM: 1.6 MG/DL (ref 1.7–2.4)
MCH RBC QN AUTO: 33.3 PG (ref 27–31.3)
MCHC RBC AUTO-ENTMCNC: 35.1 % (ref 33–37)
MCV RBC AUTO: 94.8 FL (ref 82–100)
METHADONE SCREEN, URINE: NORMAL
MONOCYTES ABSOLUTE: 0.9 K/UL (ref 0.2–0.8)
MONOCYTES RELATIVE PERCENT: 16.6 %
NEUTROPHILS ABSOLUTE: 2.8 K/UL (ref 1.4–6.5)
NEUTROPHILS RELATIVE PERCENT: 50.8 %
NITRITE, URINE: NEGATIVE
OPIATE SCREEN URINE: NORMAL
OXYCODONE URINE: NORMAL
PDW BLD-RTO: 12.7 % (ref 11.5–14.5)
PH UA: 7 (ref 5–9)
PHENCYCLIDINE SCREEN URINE: NORMAL
PLATELET # BLD: 477 K/UL (ref 130–400)
POTASSIUM SERPL-SCNC: 3.2 MEQ/L (ref 3.4–4.9)
PROPOXYPHENE SCREEN: NORMAL
PROTEIN UA: NEGATIVE MG/DL
RBC # BLD: 3.84 M/UL (ref 4.2–5.4)
SALICYLATE, SERUM: <0.3 MG/DL (ref 15–30)
SARS-COV-2, NAAT: NOT DETECTED
SODIUM BLD-SCNC: 124 MEQ/L (ref 135–144)
SPECIFIC GRAVITY UA: 1.01 (ref 1–1.03)
TOTAL CK: 149 U/L (ref 0–170)
TOTAL CK: 149 U/L (ref 0–170)
TOTAL PROTEIN: 7.5 G/DL (ref 6.3–8)
TRIGL SERPL-MCNC: 58 MG/DL (ref 0–150)
TSH SERPL DL<=0.05 MIU/L-ACNC: 1.11 UIU/ML (ref 0.44–3.86)
URINE REFLEX TO CULTURE: NORMAL
UROBILINOGEN, URINE: 0.2 E.U./DL
WBC # BLD: 5.6 K/UL (ref 4.8–10.8)

## 2021-03-24 PROCEDURE — 84443 ASSAY THYROID STIM HORMONE: CPT

## 2021-03-24 PROCEDURE — 82077 ASSAY SPEC XCP UR&BREATH IA: CPT

## 2021-03-24 PROCEDURE — 80307 DRUG TEST PRSMV CHEM ANLYZR: CPT

## 2021-03-24 PROCEDURE — 80053 COMPREHEN METABOLIC PANEL: CPT

## 2021-03-24 PROCEDURE — 85025 COMPLETE CBC W/AUTO DIFF WBC: CPT

## 2021-03-24 PROCEDURE — 82550 ASSAY OF CK (CPK): CPT

## 2021-03-24 PROCEDURE — 93005 ELECTROCARDIOGRAM TRACING: CPT | Performed by: PHYSICIAN ASSISTANT

## 2021-03-24 PROCEDURE — 36415 COLL VENOUS BLD VENIPUNCTURE: CPT

## 2021-03-24 PROCEDURE — 99285 EMERGENCY DEPT VISIT HI MDM: CPT

## 2021-03-24 PROCEDURE — 6370000000 HC RX 637 (ALT 250 FOR IP): Performed by: PHYSICIAN ASSISTANT

## 2021-03-24 PROCEDURE — 80179 DRUG ASSAY SALICYLATE: CPT

## 2021-03-24 PROCEDURE — 80143 DRUG ASSAY ACETAMINOPHEN: CPT

## 2021-03-24 PROCEDURE — 80061 LIPID PANEL: CPT

## 2021-03-24 PROCEDURE — 83735 ASSAY OF MAGNESIUM: CPT

## 2021-03-24 PROCEDURE — 87635 SARS-COV-2 COVID-19 AMP PRB: CPT

## 2021-03-24 PROCEDURE — 2580000003 HC RX 258: Performed by: PHYSICIAN ASSISTANT

## 2021-03-24 PROCEDURE — 81003 URINALYSIS AUTO W/O SCOPE: CPT

## 2021-03-24 RX ORDER — LANOLIN ALCOHOL/MO/W.PET/CERES
400 CREAM (GRAM) TOPICAL ONCE
Status: COMPLETED | OUTPATIENT
Start: 2021-03-24 | End: 2021-03-24

## 2021-03-24 RX ORDER — LANOLIN ALCOHOL/MO/W.PET/CERES
400 CREAM (GRAM) TOPICAL DAILY
Status: DISCONTINUED | OUTPATIENT
Start: 2021-03-25 | End: 2021-03-24

## 2021-03-24 RX ORDER — 0.9 % SODIUM CHLORIDE 0.9 %
1000 INTRAVENOUS SOLUTION INTRAVENOUS ONCE
Status: COMPLETED | OUTPATIENT
Start: 2021-03-24 | End: 2021-03-24

## 2021-03-24 RX ORDER — LANOLIN ALCOHOL/MO/W.PET/CERES
400 CREAM (GRAM) TOPICAL DAILY
Status: DISCONTINUED | OUTPATIENT
Start: 2021-03-24 | End: 2021-03-24

## 2021-03-24 RX ADMIN — SODIUM CHLORIDE 1000 ML: 9 INJECTION, SOLUTION INTRAVENOUS at 21:15

## 2021-03-24 RX ADMIN — POTASSIUM BICARBONATE 20 MEQ: 782 TABLET, EFFERVESCENT ORAL at 21:15

## 2021-03-24 RX ADMIN — Medication 400 MG: at 23:59

## 2021-03-24 NOTE — ED NOTES
Lab was here but has to return due to another pt was needing to be medicated and this pt understood     Sydney Boles, CATHERINE  03/24/21 5657

## 2021-03-24 NOTE — ED TRIAGE NOTES
Pt came to the ER from home via an ambulance, [facility] called the ambulance  Pt denies any S/H/I.   Pt has loose associations, flight of ideas, manic behavior and speech on arrival to the ER

## 2021-03-24 NOTE — ED PROVIDER NOTES
[address] [facility] ED  eMERGENCY dEPARTMENT eNCOUnter      Pt Name: Rosy Aguirre  MRN: [id number]  [place] 1960  Date of evaluation: 3/24/2021  Provider: Graeme Barr PA-C    CHIEF COMPLAINT       Chief Complaint   Patient presents with    Mental Health Problem     pink slipped by Trigarrett Duckworth. HISTORY OF PRESENT ILLNESS   (Location/Symptom, Timing/Onset,Context/Setting, Quality, Duration, Modifying Factors, Severity)  Note limiting factors. Rosy Aguirre is a 61 y.o. female who presents to the emergency department patient presents with [place] medication noncompliance. Recent fall questioning activities of daily living see noted pink slip. Patient denies fever chills nausea vomiting she remains ambulatory conversant denies any injuries cuts bruises. Most recent is head injury which was CAT scan on recent admission. Symptoms moderate severity nothing improves or worsen symptoms. HPI    NursingNotes were reviewed. REVIEW OF SYSTEMS    (2-9 systems for level 4, 10 or more for level 5)     Review of Systems   Constitutional: Negative for activity change, appetite change, chills, fever and unexpected weight change. HENT: Negative for ear discharge, nosebleeds and voice change. Eyes: Negative for photophobia and discharge. Respiratory: Negative for apnea, cough and shortness of breath. Cardiovascular: Negative for chest pain. Gastrointestinal: Negative for abdominal distention, anal bleeding, nausea and vomiting. Endocrine: Negative for cold intolerance, heat intolerance and polyphagia. Genitourinary: Negative for genital sores. Musculoskeletal: Negative for back pain, joint swelling and neck pain. Skin: Negative for pallor. Allergic/Immunologic: Negative for immunocompromised state. Neurological: Negative for seizures and facial asymmetry. Hematological: Does not bruise/bleed easily.    Psychiatric/Behavioral: Negative for behavioral problems, self-injury and sleep disturbance. The patient is hyperactive. All other systems reviewed and are negative. Except as noted above the remainder of the review of systems was reviewed and negative. PAST MEDICAL HISTORY     Past Medical History:   Diagnosis Date    Cancer [facility])     Cervical cancer ([facility] [address]     ETOH abuse          SURGICALHISTORY     History reviewed. No pertinent surgical history. CURRENT MEDICATIONS       Discharge Medication List as of 3/25/2021  6:09 AM      CONTINUE these medications which have NOT CHANGED    Details   mometasone (ELOCON) 0.1 % cream Apply 45 g topically daily, Topical, DAILY Starting Tue 2/2/2021, Historical Med      amLODIPine (NORVASC) 5 MG tablet Take 1 tablet by mouth daily, Disp-30 tablet, R-3Normal      thiamine 100 MG tablet Take 1 tablet by mouth daily, Disp-30 tablet, [id number]      folic acid (FOLVITE) 1 MG tablet Take 1 tablet by mouth daily, Disp-30 tablet, R-3Normal      Vitamin D (CHOLECALCIFEROL) 50 MCG (2000 UT) TABS tablet Take 1 tablet by mouth daily, Disp-120 tablet, R-0Labeling may look different. 25 mcg=1000 Units. Please double check dosages. Normal      potassium chloride (KLOR-CON M) 20 MEQ extended release tablet Take 1 tablet by mouth daily, Disp-30 tablet, R-0Normal      ammonium lactate (LAC-HYDRIN) 12 % lotion Apply topically as needed for Dry Skin Apply topically as needed., Topical, PRN, Historical Med      fluconazole (DIFLUCAN) 150 MG tablet Take 1 tablet by mouth once a weekHistorical Med             ALLERGIES     Zantac [ranitidine hcl]    FAMILY HISTORY     History reviewed. No pertinent family history.        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 Pupils: Pupils are equal, round, and reactive to light. Neck:      Musculoskeletal: Normal range of motion and neck supple. Cardiovascular:      Rate and Rhythm: Normal rate and regular rhythm. Pulses: Normal pulses. Heart sounds: Normal heart sounds. Pulmonary:      Effort: Pulmonary effort is normal. No respiratory distress. Breath sounds: Normal breath sounds. No stridor. Abdominal:      General: Bowel sounds are normal. There is no distension. Palpations: Abdomen is soft. Musculoskeletal: Normal range of motion. Skin:     General: Skin is warm. Findings: No erythema. Neurological:      Mental Status: She is alert and oriented to person, place, and time. Motor: No weakness. DIAGNOSTIC RESULTS     EKG: All EKG's are interpreted by the Emergency Department Physician who either signs or Co-signsthis chart in the absence of a cardiologist.    EKG normal sinus rate 69 negST segment elevation. UT interval 166 ms QRS 62 ms QT 4 2 ms PRT axes positive.     RADIOLOGY:   Non-plain filmimages such as CT, Ultrasound and MRI are read by the radiologist. Plain radiographic images are visualized and preliminarily interpreted by the emergency physician with the below findings:         Interpretation per the Radiologist below, if available at the time ofthis note:    No orders to display         ED BEDSIDE ULTRASOUND:   Performed by ED Physician - none    LABS:  Labs Reviewed   CBC WITH AUTO DIFFERENTIAL - Abnormal; Notable for the following components:       Result Value    RBC 3.84 (*)     Hematocrit 36.4 (*)     MCH 33.3 (*)     Platelets 462 (*)     Monocytes Absolute 0.9 (*)     All other components within normal limits   COMPREHENSIVE METABOLIC PANEL - Abnormal; Notable for the following components:    Sodium 124 (*)     Potassium 3.2 (*)     Chloride 91 (*)     BUN 7 (*)     CREATININE 0.31 (*)     Globulin 3.6 (*)     All other components within specified.     DISCHARGE MEDICATIONS:  Discharge Medication List as of 3/25/2021  6:09 AM             (Please note that portions of this note were completed with a voice recognition program.  Efforts were made to edit the dictations but occasionally words are mis-transcribed.)    Cecilia Ch PA-C (electronically signed)  Attending Emergency Physician       Cecilia Ch PA-C  03/25/21 0025       Cecilia Ch PA-C  04/11/21 1843

## 2021-03-24 NOTE — ED NOTES
Lab is here to draw the pt's blood work she is cooperative with them  [facility] staff are present while the pt's blood work is being completed     Catracho Fam RN  03/24/21 9149

## 2021-03-24 NOTE — ED NOTES
Declan aNm is here to see the pt in her bed area for a medical assessment.   Pt is cooperative with assessing her medically     Mercy Reveal, RN  03/24/21 1248

## 2021-03-24 NOTE — ED NOTES
Called lab for the pt's blood work and talked with Martha Weber in the lab     Jakob Kunz RN  03/24/21 9291

## 2021-03-25 VITALS
OXYGEN SATURATION: 97 % | SYSTOLIC BLOOD PRESSURE: 113 MMHG | WEIGHT: 82 LBS | DIASTOLIC BLOOD PRESSURE: 69 MMHG | BODY MASS INDEX: 16.01 KG/M2 | TEMPERATURE: 97.5 F | HEART RATE: 70 BPM | RESPIRATION RATE: 16 BRPM

## 2021-03-25 LAB
ANION GAP SERPL CALCULATED.3IONS-SCNC: 7 MEQ/L (ref 9–15)
BUN BLDV-MCNC: 7 MG/DL (ref 8–23)
CALCIUM SERPL-MCNC: 8.1 MG/DL (ref 8.5–9.9)
CHLORIDE BLD-SCNC: 101 MEQ/L (ref 95–107)
CO2: 25 MEQ/L (ref 20–31)
CREAT SERPL-MCNC: 0.35 MG/DL (ref 0.5–0.9)
GFR AFRICAN AMERICAN: >60
GFR NON-AFRICAN AMERICAN: >60
GLUCOSE BLD-MCNC: 124 MG/DL (ref 70–99)
POTASSIUM SERPL-SCNC: 3.1 MEQ/L (ref 3.4–4.9)
SODIUM BLD-SCNC: 133 MEQ/L (ref 135–144)

## 2021-03-25 PROCEDURE — 93010 ELECTROCARDIOGRAM REPORT: CPT | Performed by: INTERNAL MEDICINE

## 2021-03-25 RX ORDER — MOMETASONE FUROATE 1 MG/G
45 CREAM TOPICAL DAILY
COMMUNITY
Start: 2021-02-02

## 2021-03-25 RX ORDER — FLUCONAZOLE 150 MG/1
1 TABLET ORAL WEEKLY
COMMUNITY
Start: 2021-02-20

## 2021-03-25 NOTE — ED NOTES
Patient resting on cart with eyes closed, arouses easily to voice. No distress noted at this time. Patient denies needs or complaints.       Andrew Martin RN  03/24/21 4259

## 2021-03-25 NOTE — ED NOTES
Received a return call from Salina Cornejo at [place]. Patient is currently being reviewed.       Ameena Everett RN  03/25/21 5843
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
## 2021-03-25 NOTE — ED NOTES
Bed: BAC02  Expected date: 3/25/21  Expected time:   Means of arrival:   Comments:   For Bed [facility]  03/25/21 0126

## 2021-03-25 NOTE — ED NOTES
Ambulated patient to restroom without difficulty. Steady gait noted. Patient alert and oriented. Urine specimen obtained and sent to lab. Patient back to bed, warm, blankets provided. No distress noted at this time. Skin pink, warm, and dry. Respirations equal and unlabored.       Delisa Peralta RN  03/24/21 8878

## 2021-03-25 NOTE — ED NOTES
Patient admitted to [facility] bed 2.      Janie Lam RN  03/25/21 5716

## 2021-03-25 NOTE — ED NOTES
Received a return call from [facility]. Patient accepted by Dr. Albert Smoker to bed 102 bed on [facility]. Nurse to nurse [phone].      Rosario Lesch, RN  03/25/21 4954

## 2021-03-26 LAB
ANION GAP SERPL CALCULATED.3IONS-SCNC: 11 MEQ/L (ref 9–15)
BUN BLDV-MCNC: 6 MG/DL (ref 8–23)
CALCIUM SERPL-MCNC: 9.3 MG/DL (ref 8.5–9.9)
CHLORIDE BLD-SCNC: 96 MEQ/L (ref 95–107)
CO2: 26 MEQ/L (ref 20–31)
CREAT SERPL-MCNC: 0.36 MG/DL (ref 0.5–0.9)
GFR AFRICAN AMERICAN: >60
GFR NON-AFRICAN AMERICAN: >60
GLUCOSE BLD-MCNC: 55 MG/DL (ref 70–99)
MAGNESIUM: 1.7 MG/DL (ref 1.7–2.4)
PHOSPHORUS: 3.6 MG/DL (ref 2.3–4.8)
POTASSIUM SERPL-SCNC: 3.6 MEQ/L (ref 3.4–4.9)
SODIUM BLD-SCNC: 133 MEQ/L (ref 135–144)

## 2021-03-27 LAB
ANION GAP SERPL CALCULATED.3IONS-SCNC: 8 MEQ/L (ref 9–15)
BUN BLDV-MCNC: 9 MG/DL (ref 8–23)
CALCIUM SERPL-MCNC: 8.5 MG/DL (ref 8.5–9.9)
CHLORIDE BLD-SCNC: 94 MEQ/L (ref 95–107)
CO2: 27 MEQ/L (ref 20–31)
CREAT SERPL-MCNC: 0.33 MG/DL (ref 0.5–0.9)
GFR AFRICAN AMERICAN: >60
GFR NON-AFRICAN AMERICAN: >60
GLUCOSE BLD-MCNC: 83 MG/DL (ref 70–99)
POTASSIUM SERPL-SCNC: 3.6 MEQ/L (ref 3.4–4.9)
SODIUM BLD-SCNC: 129 MEQ/L (ref 135–144)

## 2021-03-28 LAB
ANION GAP SERPL CALCULATED.3IONS-SCNC: 8 MEQ/L (ref 9–15)
BUN BLDV-MCNC: 7 MG/DL (ref 8–23)
CALCIUM SERPL-MCNC: 9.3 MG/DL (ref 8.5–9.9)
CHLORIDE BLD-SCNC: 97 MEQ/L (ref 95–107)
CO2: 27 MEQ/L (ref 20–31)
CREAT SERPL-MCNC: 0.32 MG/DL (ref 0.5–0.9)
GFR AFRICAN AMERICAN: >60
GFR NON-AFRICAN AMERICAN: >60
GLUCOSE BLD-MCNC: 65 MG/DL (ref 70–99)
POTASSIUM SERPL-SCNC: 3.9 MEQ/L (ref 3.4–4.9)
SODIUM BLD-SCNC: 132 MEQ/L (ref 135–144)

## 2021-03-29 LAB
ANION GAP SERPL CALCULATED.3IONS-SCNC: 8 MEQ/L (ref 9–15)
BUN BLDV-MCNC: 6 MG/DL (ref 8–23)
CALCIUM SERPL-MCNC: 9.1 MG/DL (ref 8.5–9.9)
CHLORIDE BLD-SCNC: 92 MEQ/L (ref 95–107)
CO2: 27 MEQ/L (ref 20–31)
CREAT SERPL-MCNC: 0.36 MG/DL (ref 0.5–0.9)
GFR AFRICAN AMERICAN: >60
GFR NON-AFRICAN AMERICAN: >60
GLUCOSE BLD-MCNC: 80 MG/DL (ref 70–99)
POTASSIUM SERPL-SCNC: 3.6 MEQ/L (ref 3.4–4.9)
SODIUM BLD-SCNC: 127 MEQ/L (ref 135–144)

## 2021-04-11 ASSESSMENT — ENCOUNTER SYMPTOMS
NAUSEA: 0
VOICE CHANGE: 0
APNEA: 0
COUGH: 0
ANAL BLEEDING: 0
ABDOMINAL DISTENTION: 0
EYE DISCHARGE: 0
PHOTOPHOBIA: 0
VOMITING: 0
BACK PAIN: 0
SHORTNESS OF BREATH: 0

## 2021-04-14 NOTE — ED NOTES
Physical Therapy Discharge Summary    Referred by: Zenaida Lizarraga DO  Medical Diagnosis (from order):   Diagnosis Information      Diagnosis    724.5 (ICD-9-CM) - M54.9 (ICD-10-CM) - Back pain, unspecified back location, unspecified back pain laterality, unspecified chronicity                Current Functional Limitations: out of network. Will look for an in network provider    OBJECTIVE   remeasurements as noted in attached daily treatment note        Referred by: Zenaida Lizarraga DO; Medical Diagnosis (from order):    Diagnosis Information      Diagnosis    724.5 (ICD-9-CM) - M54.9 (ICD-10-CM) - Back pain, unspecified back location, unspecified back pain laterality, unspecified chronicity                Initial Evaluation    Visit:  1   Treatment Diagnosis: lumbar, symptoms with increased pain/symptoms  Chart reviewed at time of initial evaluation (relevant co-morbidities, allergies, tests and medications listed): Current Outpatient Medications:  Pediatric Multiple Vit-C-FA (MULTIVITAMIN CHILDRENS PO), Take by mouth daily., Disp: , Rfl:   MELATONIN PO, Take by mouth at bedtime., Disp: , Rfl:     No current facility-administered medications for this visit.     Diagnostic Tests: No diagnostic tests were performed    SUBJECTIVE                                                                                                             Patient is present with father Vinnie who consents to eval and treatment. Patient reports that she began experiencing back pain in winter of 2019 while she is playing basketball.  She states she plays a post position.  She states since that time she has had on and off pain especially when she play sports.  She is very active in multiple sports throughout the year.  Currently she is participating in outdoor club soccer and middle school track.  She has practice for trap 5 nights a week and some nights soccer afterwards.  She rates her pain as an 8-10.  She has difficulty sitting in class.   Received call from Soundra Kocher at [facility] requesting pink slip, negative Covid and Perez ID number, faxed at this time.       Lele Argueta RN  03/25/21 6612 Patient states that she is running she has pain that increases she has pain when she stops afterwards through the night and into the next day.  Patient underwent chiropractic care for several weeks with no improvement in symptoms.  Patient denies any numbness or tingling down into the legs.  Patient states she usually uses alternating heat and ice and Tylenol for pain control.    Pain / Symptoms:  Pain/symptom is: constant  Pain rating (out of 10): Current: 8 ; Worst: 10  Location: L4-L5 of the spine centrally located but more right-sided     Quality / Description: sore.  Alleviating Factors: rest.   Progression since onset: worsening  Function:   Limitations / Exacerbation Factors: pain, difficulty, bed mobility, lifting/carrying, walking and sitting, community distances, stairs, walking quickly as required to cross a street/exit a building rapidly, Running  Prior Level of Function: pain free ADLs and IADLs,  Patient Goals: decreased pain and independence with ADLs/IADLs    Prior treatment: no therapies, chiropractic services  Discharged from hospital, home health, or skilled nursing facility in last 30 days: no    Home Environment:   Patient lives with parent or legal guardian  Type of home: single level home  Assistance available: consistant  Feels safe at home/work/school.  2 or more falls or an unexplained fall with injury in the last year:  No    OBJECTIVE                                                                                                                     Observed Gait:     Slight pelvic drop on right; has pain across central low back  Stairs: avoid descending using right; able to do so though  Deep squat no difficulty    Range of Motion (ROM)   (degrees unless noted; active unless noted; norms in ( ); negative=lacking to 0, positive=beyond 0)   Lumbar:  Impairment Level:       - Flexion: pain and minimal impairment    - Side Bend:        • Left: pain         • Right: pain     Screen(s):      Thoracic: Negative      Hip: Negative      Strength  (out of 5 unless noted, standard test position unless noted, lbs tested with hand held dynamometer)   5/5: LLE, RLE, except as noted  Hip:    - Flexion:        • Right (L2-3): pain     - Abduction:        • Left (L4-5): pain    - External Rotation:        • Right (L4-5): pain   Comments / Details: Pain in right low back       Palpation:   Right Lower Extremity: Gluteus Micah:hypertonic, tenderness; Gluteus Medius:hypertonic, tenderness;   Spine - Left: Lumbar Paraspinals: hypertonic; Quadratus Lumborum: hypertonic;   Spine - Right: Lumbar Paraspinals: hypertonic and tenderness; Quadratus Lumborum: hypertonic and tenderness; no sacroiliac joint tenderness, no L2-L1 tenderness, no L3-L4 tenderness, no L4-L5 tenderness, no L5-S1 tenderness, no thoracolumbar junction tenderness, no greater trochanter tenderness    Apparent Leg Length:   - Medial Malleoli in Supine: level     Muscle Flexibility:   - Hip External Rotators: Right: minimal limitation  - Knee Extensors: Right: minimal limitation     Joint Play:   Lumbar:     - L4-L5: Left: WFL Right: WFL    - L5-S1: Left: WFL Right: WFL  Sacroiliac joint:  Left: WFL Right: WFL    Special Tests:  Straight Leg Raise:     Passive supine: Left: negative Right: negative  Vertical Lumbar Compression: Left: negative Right: positive      Jump/Hop:   Single leg hop right: pain    Standing Balance:   Single Leg:     Firm surface, left, eyes open: 20 sec    Firm surface, right, eyes open: 20 sec  Outcome Measures:   OSWESTRY Total Scored: 11  OSWESTRY Total Possible Score: 45  OSWESTRY Score Calculated: 24.44 %  (0-20% = minimal disability; 20-40% = moderate disability; 40-60% = severe disability; 60-80% = crippled; % = bed bound) see flowsheet for additional documentation   TREATMENT                                                                                                                initial evaluation  completed    Therapeutic Exercise:  Patient performed and was instructed on the correct form of below exercises:   Long trunk roll x10 H  Double Knee to Chest stretch  3x20\"  Hamstring /nerve glide x10   Pelvic tilt 3x10  3 way child's pose x3  Piriformis stretch x3 right  Handouts given. H= added to home exercise program.      Activities of Daily Living/Self Care:        Skilled input: verbal instruction/cues, tactile instruction/cues, posture correction and as detailed above    Writer verbally educated and received verbal consent for hand placement, positioning of patient, and techniques to be performed today from patient for therapist position for techniques as described above and how they are pertinent to the patient's plan of care.    Home Exercise Program: Long trunk roll x10 H  Double Knee to Chest stretch  3x20\"  Hamstring /nerve glide x10   Pelvic tilt 3x10  3 way child's pose x3  Piriformis stretch x3 right  Handouts given. H= added to home exercise program.     ASSESSMENT                                                                                                           12 year old female patient has reported functional limitations listed above impacted by signs and symptoms consistent with below   • Involved: lumbar   • Symptoms/impairments: increased pain/symptoms  Patient presents with persistent low back pain across low back right side more than left. Pain started during basketball season 2019. Worsens with running. She is currently in outdoor club soccer as a forward and middle school track all events. She will have her first soccer game Saturday. She currently has pain with sitting in class; running; stairs. She tried chiropractic care for several weeks with no improvements. She has not had any imaging yet. Discussed bring her running shoes next session and look at her running gait. Also discussed cutting back on her sport activities to allow her to heal. She would like to start with the HEP  and see if that helps and then if not will pause her activities.    Prognosis: patient will benefit from skilled therapy  Rehabilitative potential is: good  Predicted patient presentation: Moderate (evolving) - Patient comorbidities and complexities, as defined above, may have varying impact on steady progress for prescribed plan of care.  Patient Education:   Who will be receiving education: patient and patient's parent(s)  Are they ready to learn: yes  Preferred learning style: written, verbal, demonstration and video  Barriers to learning: no barriers apparent at this time  Results of above outlined education: Verbalizes understanding, Demonstrates understanding and Needs reinforcement      PLAN                                                                                                                         The following skilled interventions to be implemented to achieve goals listed below:  Activities of Daily Living/Self Care (75886)  Manual Therapy (64839)  Neuromuscular Re-Education (50934)  Therapeutic Activity (99618)  Therapeutic Exercise (36569)  Electrical Stimulation (73694//07116)  Ultrasound/Phonophoresis (84648)    Frequency / Duration: 1 times per week tapering as patient progresses for an estimated total of 6 visits for 12 weeks    Patient and patient's parent(s) involved in and agreed to plan of care and goals.    GOALS                                                                                                                           Long Term Goals: to be met by end of plan of care  1. Patient will be independent with instructed task modification.  2. Patient will ascend and descend using reciprocal pattern and without pain for tasks for independent living down to her bedroom.  3. Patient will sit for 60 minutes for classroom instruction with pain level <4/10.  4. Patient will be independent with progressed and modified home exercise program.      Therapy procedure time and total  treatment time can be found documented on the Time Entry flowsheet

## 2021-04-28 ENCOUNTER — HOSPITAL ENCOUNTER (EMERGENCY)
Age: 61
Discharge: ANOTHER HOSPITAL | End: 2021-04-29
Attending: EMERGENCY MEDICINE
Payer: COMMERCIAL

## 2021-04-28 DIAGNOSIS — F43.20 ADJUSTMENT DISORDER, UNSPECIFIED TYPE: Primary | ICD-10-CM

## 2021-04-28 DIAGNOSIS — F01.518 VASCULAR DEMENTIA WITH BEHAVIOR DISTURBANCE: ICD-10-CM

## 2021-04-28 LAB
ACETAMINOPHEN LEVEL: <5 UG/ML (ref 10–30)
ALBUMIN SERPL-MCNC: 3.9 G/DL (ref 3.5–4.6)
ALP BLD-CCNC: 98 U/L (ref 40–130)
ALT SERPL-CCNC: 16 U/L (ref 0–33)
AMPHETAMINE SCREEN, URINE: NORMAL
ANION GAP SERPL CALCULATED.3IONS-SCNC: 9 MEQ/L (ref 9–15)
AST SERPL-CCNC: 21 U/L (ref 0–35)
BARBITURATE SCREEN URINE: NORMAL
BASOPHILS ABSOLUTE: 0 K/UL (ref 0–0.2)
BASOPHILS RELATIVE PERCENT: 0.5 %
BENZODIAZEPINE SCREEN, URINE: NORMAL
BILIRUB SERPL-MCNC: <0.2 MG/DL (ref 0.2–0.7)
BILIRUBIN URINE: NEGATIVE
BLOOD, URINE: NEGATIVE
BUN BLDV-MCNC: 12 MG/DL (ref 8–23)
CALCIUM SERPL-MCNC: 8.8 MG/DL (ref 8.5–9.9)
CANNABINOID SCREEN URINE: NORMAL
CHLORIDE BLD-SCNC: 99 MEQ/L (ref 95–107)
CHOLESTEROL, TOTAL: 180 MG/DL (ref 0–199)
CHP ED QC CHECK: YES
CLARITY: CLEAR
CO2: 26 MEQ/L (ref 20–31)
COCAINE METABOLITE SCREEN URINE: NORMAL
COLOR: YELLOW
CREAT SERPL-MCNC: 0.44 MG/DL (ref 0.5–0.9)
EOSINOPHILS ABSOLUTE: 0.1 K/UL (ref 0–0.7)
EOSINOPHILS RELATIVE PERCENT: 0.9 %
ETHANOL PERCENT: NORMAL G/DL
ETHANOL: <10 MG/DL (ref 0–0.08)
GFR AFRICAN AMERICAN: >60
GFR NON-AFRICAN AMERICAN: >60
GLOBULIN: 3.4 G/DL (ref 2.3–3.5)
GLUCOSE BLD-MCNC: 68 MG/DL
GLUCOSE BLD-MCNC: 68 MG/DL (ref 60–115)
GLUCOSE BLD-MCNC: 83 MG/DL (ref 70–99)
GLUCOSE URINE: NEGATIVE MG/DL
HCT VFR BLD CALC: 33.4 % (ref 37–47)
HDLC SERPL-MCNC: 95 MG/DL (ref 40–59)
HEMOGLOBIN: 11.1 G/DL (ref 12–16)
KETONES, URINE: NEGATIVE MG/DL
LDL CHOLESTEROL CALCULATED: 76 MG/DL (ref 0–129)
LEUKOCYTE ESTERASE, URINE: NEGATIVE
LYMPHOCYTES ABSOLUTE: 1.5 K/UL (ref 1–4.8)
LYMPHOCYTES RELATIVE PERCENT: 18.4 %
Lab: NORMAL
MAGNESIUM: 1.8 MG/DL (ref 1.7–2.4)
MCH RBC QN AUTO: 32.6 PG (ref 27–31.3)
MCHC RBC AUTO-ENTMCNC: 33.4 % (ref 33–37)
MCV RBC AUTO: 97.6 FL (ref 82–100)
METHADONE SCREEN, URINE: NORMAL
MONOCYTES ABSOLUTE: 1.2 K/UL (ref 0.2–0.8)
MONOCYTES RELATIVE PERCENT: 14.5 %
NEUTROPHILS ABSOLUTE: 5.5 K/UL (ref 1.4–6.5)
NEUTROPHILS RELATIVE PERCENT: 65.7 %
NITRITE, URINE: NEGATIVE
OPIATE SCREEN URINE: NORMAL
OSMOLALITY: 282 MOSM/KG (ref 280–303)
PDW BLD-RTO: 13.9 % (ref 11.5–14.5)
PERFORMED ON: NORMAL
PH UA: 7 (ref 5–9)
PHENCYCLIDINE SCREEN URINE: NORMAL
PLATELET # BLD: 534 K/UL (ref 130–400)
POTASSIUM SERPL-SCNC: 4.1 MEQ/L (ref 3.4–4.9)
PROPOXYPHENE SCREEN, URINE: NORMAL
PROTEIN UA: NEGATIVE MG/DL
RBC # BLD: 3.42 M/UL (ref 4.2–5.4)
SALICYLATE, SERUM: <0.3 MG/DL (ref 15–30)
SARS-COV-2, NAAT: NOT DETECTED
SODIUM BLD-SCNC: 134 MEQ/L (ref 135–144)
SPECIFIC GRAVITY UA: 1.01 (ref 1–1.03)
TOTAL CK: 146 U/L (ref 0–170)
TOTAL PROTEIN: 7.3 G/DL (ref 6.3–8)
TRIGL SERPL-MCNC: 46 MG/DL (ref 0–150)
TSH SERPL DL<=0.05 MIU/L-ACNC: 0.99 UIU/ML (ref 0.44–3.86)
UR OXYCODONE RAPID SCREEN: NORMAL
URINE REFLEX TO CULTURE: NORMAL
UROBILINOGEN, URINE: 0.2 E.U./DL
WBC # BLD: 8.4 K/UL (ref 4.8–10.8)

## 2021-04-28 PROCEDURE — 99285 EMERGENCY DEPT VISIT HI MDM: CPT

## 2021-04-28 PROCEDURE — 80143 DRUG ASSAY ACETAMINOPHEN: CPT

## 2021-04-28 PROCEDURE — 81003 URINALYSIS AUTO W/O SCOPE: CPT

## 2021-04-28 PROCEDURE — 83735 ASSAY OF MAGNESIUM: CPT

## 2021-04-28 PROCEDURE — 82077 ASSAY SPEC XCP UR&BREATH IA: CPT

## 2021-04-28 PROCEDURE — 83930 ASSAY OF BLOOD OSMOLALITY: CPT

## 2021-04-28 PROCEDURE — 85025 COMPLETE CBC W/AUTO DIFF WBC: CPT

## 2021-04-28 PROCEDURE — 80306 DRUG TEST PRSMV INSTRMNT: CPT

## 2021-04-28 PROCEDURE — 82550 ASSAY OF CK (CPK): CPT

## 2021-04-28 PROCEDURE — 87635 SARS-COV-2 COVID-19 AMP PRB: CPT

## 2021-04-28 PROCEDURE — 36415 COLL VENOUS BLD VENIPUNCTURE: CPT

## 2021-04-28 PROCEDURE — 93005 ELECTROCARDIOGRAM TRACING: CPT | Performed by: EMERGENCY MEDICINE

## 2021-04-28 PROCEDURE — 80061 LIPID PANEL: CPT

## 2021-04-28 PROCEDURE — 84443 ASSAY THYROID STIM HORMONE: CPT

## 2021-04-28 PROCEDURE — 80179 DRUG ASSAY SALICYLATE: CPT

## 2021-04-28 PROCEDURE — 80053 COMPREHEN METABOLIC PANEL: CPT

## 2021-04-28 ASSESSMENT — ENCOUNTER SYMPTOMS
ABDOMINAL PAIN: 0
SORE THROAT: 0
EYE PAIN: 0
NAUSEA: 0
SHORTNESS OF BREATH: 0
CHEST TIGHTNESS: 0
VOMITING: 0

## 2021-04-28 NOTE — ED NOTES
Patient given orange juice, pudding, sandwhich, and chips. Dayana Farmer [facility]  04/28/21 5789

## 2021-04-28 NOTE — ED PROVIDER NOTES
[address] [facility] ED  EMERGENCY DEPARTMENT ENCOUNTER      Pt Name: Zena Benedict  MRN: [id number]  [place] 1960  Date of evaluation: 4/28/2021  Provider: Karla Contreras MD    CHIEF COMPLAINT       Chief Complaint   Patient presents with    Altered Mental Status     pt a/o x 3          HISTORY OF PRESENT ILLNESS   (Location/Symptom, Timing/Onset, Context/Setting, Quality, Duration, Modifying Factors, Severity)  Note limiting factors. Zena Benedict is a 61 y.o. female who presents to the emergency department . Patient brought in after she had been reported missing. She was picked up at her mother's house. Apparently someone said that she got in a stranger's car. Patient does have mental illness and has history of alcohol abuse. Was admitted to [facility] approximately 1 month ago. Patient had a history of hyponatremia. Patient is denying any complaints. She is denying getting in a strangers car and states that she lives with her boyfriend who is 79years old and needs help because he had a stroke. HPI    Nursing Notes were reviewed. REVIEW OF SYSTEMS    (2-9 systems for level 4, 10 or more for level 5)     Review of Systems   Constitutional: Negative for activity change, appetite change and fatigue. HENT: Negative for congestion and sore throat. Eyes: Negative for pain and visual disturbance. Respiratory: Negative for chest tightness and shortness of breath. Cardiovascular: Negative for chest pain. Gastrointestinal: Negative for abdominal pain, nausea and vomiting. Endocrine: Negative for polydipsia. Genitourinary: Negative for flank pain and urgency. Musculoskeletal: Negative for gait problem and neck stiffness. Skin: Negative for rash. Neurological: Negative for weakness, light-headedness and headaches. Psychiatric/Behavioral: Negative for confusion and sleep disturbance.        Except as noted above the remainder of the review of systems was reviewed and Never     Active member of club or organization: No     Attends meetings of clubs or organizations: Not on file     Relationship status: Living with partner    Intimate partner violence     Fear of current or ex partner: No     Emotionally abused: No     Physically abused: No     Forced sexual activity: No   Other Topics Concern    Not on file   Social History Narrative    [place] 65 in [place] living with SO-no longer can stay there. Now for LTC placement--ETOH dementia                                  SCREENINGS        Hayde Coma Scale  Eye Opening: Spontaneous  Best Verbal Response: Oriented  Best Motor Response: Obeys commands  Hayde Coma Scale Score: 15               PHYSICAL EXAM    (up to 7 for level 4, 8 or more for level 5)     ED Triage Vitals [04/28/21 1521]   BP Temp Temp Source Pulse Resp SpO2 Height Weight   123/81 97.4 °F (36.3 °C) Oral 76 18 100 % -- 91 lb (41.3 kg)       Physical Exam  Vitals signs and nursing note reviewed. Constitutional:       General: She is not in acute distress. Appearance: She is not diaphoretic. Comments: Looks cachectic and malnourished   HENT:      Head: Normocephalic and atraumatic. Right Ear: External ear normal.      Left Ear: External ear normal.      Mouth/Throat:      Pharynx: No oropharyngeal exudate. Eyes:      General: No visual field deficit. Extraocular Movements: Extraocular movements intact. Conjunctiva/sclera: Conjunctivae normal.      Pupils: Pupils are equal, round, and reactive to light. Neck:      Musculoskeletal: Normal range of motion and neck supple. Thyroid: No thyromegaly. Vascular: No JVD. Trachea: No tracheal deviation. Cardiovascular:      Rate and Rhythm: Normal rate. Heart sounds: Normal heart sounds. No murmur. Pulmonary:      Effort: Pulmonary effort is normal. No respiratory distress. Breath sounds: Normal breath sounds. No wheezing.    Abdominal:      General: Bowel sounds are normal.      Palpations: Abdomen is soft. Tenderness: There is no abdominal tenderness. There is no guarding. Musculoskeletal: Normal range of motion. Skin:     General: Skin is warm and dry. Findings: No rash. Neurological:      Mental Status: She is alert and oriented to person, place, and time. GCS: GCS eye subscore is 4. GCS verbal subscore is 5. GCS motor subscore is 6. Cranial Nerves: No cranial nerve deficit, dysarthria or facial asymmetry. Psychiatric:         Behavior: Behavior normal.         DIAGNOSTIC RESULTS     EKG: All EKG's are interpreted by the Emergency Department Physician who either signs or Co-signs this chart in the absence of a cardiologist.    Normal sinus rhythm 69 bpm LVH.   No acute ischemia    RADIOLOGY:   Non-plain film images such as CT, Ultrasound and MRI are read by the radiologist. Plain radiographic images are visualized and preliminarily interpreted by the emergency physician with the below findings:        Interpretation per the Radiologist below, if available at the time of this note:    No orders to display         ED BEDSIDE ULTRASOUND:   Performed by ED Physician - none    LABS:  Labs Reviewed   CBC WITH AUTO DIFFERENTIAL - Abnormal; Notable for the following components:       Result Value    RBC 3.42 (*)     Hemoglobin 11.1 (*)     Hematocrit 33.4 (*)     MCH 32.6 (*)     Platelets 426 (*)     Monocytes Absolute 1.2 (*)     All other components within normal limits   COMPREHENSIVE METABOLIC PANEL - Abnormal; Notable for the following components:    Sodium 134 (*)     CREATININE 0.44 (*)     All other components within normal limits   ACETAMINOPHEN LEVEL - Abnormal; Notable for the following components:    Acetaminophen Level <5 (*)     All other components within normal limits   LIPID PANEL - Abnormal; Notable for the following components:    HDL 95 (*)     All other components within normal limits   SALICYLATE LEVEL - Abnormal; Notable for the following components:    Salicylate, Serum <2.4 (*)     All other components within normal limits   POCT GLUCOSE - Normal   COVID-19, RAPID   MAGNESIUM   ETHANOL   URINE RT REFLEX TO CULTURE   OSMOLALITY   URINE DRUG SCREEN, COMPREHENSIVE   CK   TSH WITHOUT REFLEX   POCT GLUCOSE       All other labs were within normal range or not returned as of this dictation. EMERGENCY DEPARTMENT COURSE and DIFFERENTIAL DIAGNOSIS/MDM:   Vitals:    Vitals:    04/28/21 1521 04/28/21 1630 04/28/21 1831   BP: 123/81 (!) 145/74 138/79   Pulse: 76 79 75   Resp: 18  18   Temp: 97.4 °F (36.3 °C)     TempSrc: Oral     SpO2: 100% 100% 99%   Weight: 91 lb (41.3 kg)         Medically clear for behavioral health    MDM       REASSESSMENT            CONSULTS:  None    PROCEDURES:  Unless otherwise noted below, none     Procedures      FINAL IMPRESSION      1. Adjustment disorder, unspecified type    2. Vascular dementia with behavior disturbance [facility])          DISPOSITION/PLAN   DISPOSITION Decision To Transfer 04/29/2021 02:53:17 AM      PATIENT REFERRED TO:  No follow-up provider specified. DISCHARGE MEDICATIONS:  New Prescriptions    No medications on file     Controlled Substances Monitoring:     No flowsheet data found.     (Please note that portions of this note were completed with a voice recognition program.  Efforts were made to edit the dictations but occasionally words are mis-transcribed.)    Lani Green MD (electronically signed)  Attending Emergency Physician           Lani Green MD  04/29/21 6029

## 2021-04-28 NOTE — ED NOTES
message left for sister Danyell Mills. [address] [facility]  Karmen Leiva  04/28/21 4130

## 2021-04-28 NOTE — ED NOTES
Yudith from [facility] aware of patient needing assessment. Palmira Bode [place], RN  04/28/21 1537

## 2021-04-28 NOTE — ED NOTES
Message left for daughter Ayaan Cosme [phone]     Kassie Britt.  Chi Fernandez RN  04/28/21 6397

## 2021-04-28 NOTE — ED TRIAGE NOTES
Pt arrived via ems from mothers home after being reported that she was missing. Per ems pt got in car with stranger. Pt denies this. Pt seen cristina one time. pt denies any complaints. [facility] on scene. pt a/o x 3 skin pin w/d resp non labored.

## 2021-04-29 VITALS
DIASTOLIC BLOOD PRESSURE: 78 MMHG | RESPIRATION RATE: 18 BRPM | WEIGHT: 91 LBS | SYSTOLIC BLOOD PRESSURE: 139 MMHG | HEART RATE: 66 BPM | TEMPERATURE: 97.4 F | OXYGEN SATURATION: 99 % | BODY MASS INDEX: 17.77 KG/M2

## 2021-04-29 LAB
EKG ATRIAL RATE: 69 BPM
EKG P AXIS: 75 DEGREES
EKG P-R INTERVAL: 152 MS
EKG Q-T INTERVAL: 404 MS
EKG QRS DURATION: 64 MS
EKG QTC CALCULATION (BAZETT): 432 MS
EKG R AXIS: 62 DEGREES
EKG T AXIS: 43 DEGREES
EKG VENTRICULAR RATE: 69 BPM

## 2021-04-29 PROCEDURE — 93010 ELECTROCARDIOGRAM REPORT: CPT | Performed by: INTERNAL MEDICINE

## 2021-04-29 NOTE — ED NOTES
Provisional Diagnosis:      Adjustment Disorder   Dementia    Psychosocial and Contextual Factors:      Patient lives at home with her boyfriend of 25 years. Patient was discharged from [facility] on 3/15/21 after she had fallen and had a closed head injury. Patient was admitted to [place] [address] on 3/24/21 due to unable to care for herself and meet her basic needs. C-SSRS Summary:     Patient: C-SSRS Suicide Screening  1) Within the past month, have you wished you were dead or wished you could go to sleep and not wake up? : No    Family: None present. Collateral received from her son Madelyn Mtz. Agency: Not currently linked          Abuse Assessment  Physical Abuse: Denies  Verbal Abuse: Denies  Emotional abuse: Denies  Financial Abuse: Denies  Sexual abuse: Denies  Elder abuse: No    Clinical Summary:      Jennifer Mckinney is a 61 y.o. female who presents to the emergency department by squad after she had been reported missing by her family. She was picked up at her sister's home which is approximately 3-4 miles from her home. Per report, she got in a stranger's car. She is denying getting in a strangers car and states that she lives with her boyfriend who is 79years old and needs help because he had a stroke. Patient currently denies SI, denies HI and denies A/V hallucinations. Speech is rapid and pressured with poor eye contact. Poor insight into events leading to admit. Patient is disheveled and very poor hygiene. Unusual and unsteady gait at times. Patient has difficulty with completing her ADL's without assistance and her sister has been coming to her apartment daily to help and provide her medications due to her noncompliance. Collateral was received from patient's son and he states he received a call from his father at 56 after he awoke and found the patient was gone.  They were unable to locate her until she arrived at her sister's home around 1:30 pm. Patient was unable to tell family how she arrived at her sister's home. Reports she is not eating and is not sleeping. She is intermittently confused and frequently leaves the home without telling anyone. Patient has had multiple falls due to unsteady gait over the past several months. Patient has a long history of ETOH of over 40 years and has recently decreased her consumption since hospitalization in March 2020. Denies any other substance abuse.  Denies any criminal charges past or present.        Level of Care Disposition:      Per Dr Shanta Zapata, RN  04/29/21 0110

## 2021-04-29 NOTE — ED NOTES
Received return call from Jocelin Gordillo at [facility]. Requests copy of EKG and medical clearance. Faxed at this time.       Avery Bustamante RN  04/29/21 7855

## 2021-04-29 NOTE — ED NOTES
Bed:  [facility]  Expected date:   Expected time:   Means of arrival:   Comments:     Audrey Henderson RN  04/28/21 5724

## 2021-04-29 NOTE — ED NOTES
Derrick Guthrie from [facility] requesting pink slip to be faxed. Faxed at this time.       Janie Lam RN  04/29/21 3763

## 2021-04-29 NOTE — ED NOTES
Call placed to Tesfaye Moody at [facility] and scheduled  for 0700 per [employer] request.      Jesus Stone RN  04/29/21 3231

## 2021-05-04 LAB
ANION GAP SERPL CALCULATED.3IONS-SCNC: 9 MEQ/L (ref 9–15)
BUN BLDV-MCNC: 12 MG/DL (ref 8–23)
CALCIUM SERPL-MCNC: 9 MG/DL (ref 8.5–9.9)
CHLORIDE BLD-SCNC: 102 MEQ/L (ref 95–107)
CO2: 26 MEQ/L (ref 20–31)
CREAT SERPL-MCNC: 0.38 MG/DL (ref 0.5–0.9)
GFR AFRICAN AMERICAN: >60
GFR NON-AFRICAN AMERICAN: >60
GLUCOSE BLD-MCNC: 80 MG/DL (ref 70–99)
POTASSIUM SERPL-SCNC: 4.2 MEQ/L (ref 3.4–4.9)
SODIUM BLD-SCNC: 137 MEQ/L (ref 135–144)

## 2021-05-05 LAB
ANION GAP SERPL CALCULATED.3IONS-SCNC: 9 MEQ/L (ref 9–15)
BUN BLDV-MCNC: 17 MG/DL (ref 8–23)
CALCIUM SERPL-MCNC: 9.2 MG/DL (ref 8.5–9.9)
CHLORIDE BLD-SCNC: 99 MEQ/L (ref 95–107)
CO2: 26 MEQ/L (ref 20–31)
CREAT SERPL-MCNC: 0.41 MG/DL (ref 0.5–0.9)
GFR AFRICAN AMERICAN: >60
GFR NON-AFRICAN AMERICAN: >60
GLUCOSE BLD-MCNC: 84 MG/DL (ref 70–99)
POTASSIUM SERPL-SCNC: 4.2 MEQ/L (ref 3.4–4.9)
SODIUM BLD-SCNC: 134 MEQ/L (ref 135–144)

## 2022-10-05 ENCOUNTER — HOSPITAL ENCOUNTER (EMERGENCY)
Age: 62
Discharge: HOME | End: 2022-10-05
Payer: COMMERCIAL

## 2022-10-05 ENCOUNTER — APPOINTMENT (OUTPATIENT)
Dept: CT IMAGING | Age: 62
End: 2022-10-05
Payer: COMMERCIAL

## 2022-10-05 VITALS
RESPIRATION RATE: 16 BRPM | OXYGEN SATURATION: 96 % | WEIGHT: 81 LBS | SYSTOLIC BLOOD PRESSURE: 144 MMHG | HEART RATE: 104 BPM | HEIGHT: 60 IN | BODY MASS INDEX: 15.9 KG/M2 | DIASTOLIC BLOOD PRESSURE: 95 MMHG | TEMPERATURE: 98.3 F

## 2022-10-05 DIAGNOSIS — N30.00 ACUTE CYSTITIS WITHOUT HEMATURIA: Primary | ICD-10-CM

## 2022-10-05 DIAGNOSIS — K29.00 ACUTE SUPERFICIAL GASTRITIS WITHOUT HEMORRHAGE: ICD-10-CM

## 2022-10-05 LAB
BACTERIA: ABNORMAL /HPF
BILIRUBIN URINE: NEGATIVE
BLOOD, URINE: ABNORMAL
CLARITY: ABNORMAL
COLOR: ABNORMAL
EPITHELIAL CELLS, UA: ABNORMAL /HPF (ref 0–5)
GLUCOSE URINE: NEGATIVE MG/DL
HYALINE CASTS: ABNORMAL /HPF (ref 0–5)
KETONES, URINE: ABNORMAL MG/DL
LEUKOCYTE ESTERASE, URINE: ABNORMAL
NITRITE, URINE: POSITIVE
PH UA: 6 (ref 5–9)
PROTEIN UA: ABNORMAL MG/DL
RBC UA: ABNORMAL /HPF (ref 0–5)
SPECIFIC GRAVITY UA: 1.02 (ref 1–1.03)
URINE REFLEX TO CULTURE: YES
UROBILINOGEN, URINE: 1 E.U./DL
WBC UA: ABNORMAL /HPF (ref 0–5)

## 2022-10-05 PROCEDURE — 87086 URINE CULTURE/COLONY COUNT: CPT

## 2022-10-05 PROCEDURE — 87186 SC STD MICRODIL/AGAR DIL: CPT

## 2022-10-05 PROCEDURE — 99284 EMERGENCY DEPT VISIT MOD MDM: CPT

## 2022-10-05 PROCEDURE — 6370000000 HC RX 637 (ALT 250 FOR IP): Performed by: PHYSICIAN ASSISTANT

## 2022-10-05 PROCEDURE — 74150 CT ABDOMEN W/O CONTRAST: CPT

## 2022-10-05 PROCEDURE — 81001 URINALYSIS AUTO W/SCOPE: CPT

## 2022-10-05 PROCEDURE — 87077 CULTURE AEROBIC IDENTIFY: CPT

## 2022-10-05 RX ORDER — CEPHALEXIN 500 MG/1
500 CAPSULE ORAL 2 TIMES DAILY
Qty: 14 CAPSULE | Refills: 0 | Status: SHIPPED | OUTPATIENT
Start: 2022-10-05 | End: 2022-10-12

## 2022-10-05 RX ORDER — CEPHALEXIN 500 MG/1
500 CAPSULE ORAL ONCE
Status: COMPLETED | OUTPATIENT
Start: 2022-10-05 | End: 2022-10-05

## 2022-10-05 RX ORDER — PANTOPRAZOLE SODIUM 20 MG/1
40 TABLET, DELAYED RELEASE ORAL DAILY
Qty: 30 TABLET | Refills: 0 | Status: SHIPPED | OUTPATIENT
Start: 2022-10-05

## 2022-10-05 RX ORDER — PANTOPRAZOLE SODIUM 40 MG/1
40 TABLET, DELAYED RELEASE ORAL ONCE
Status: COMPLETED | OUTPATIENT
Start: 2022-10-05 | End: 2022-10-05

## 2022-10-05 RX ORDER — TRAMADOL HYDROCHLORIDE 50 MG/1
50 TABLET ORAL ONCE
Status: DISCONTINUED | OUTPATIENT
Start: 2022-10-05 | End: 2022-10-05 | Stop reason: HOSPADM

## 2022-10-05 RX ORDER — TRAMADOL HYDROCHLORIDE 50 MG/1
50 TABLET ORAL EVERY 6 HOURS PRN
Qty: 12 TABLET | Refills: 0 | Status: SHIPPED | OUTPATIENT
Start: 2022-10-05 | End: 2022-10-08

## 2022-10-05 RX ADMIN — PANTOPRAZOLE SODIUM 40 MG: 40 TABLET, DELAYED RELEASE ORAL at 17:58

## 2022-10-05 RX ADMIN — CEPHALEXIN 500 MG: 500 CAPSULE ORAL at 17:58

## 2022-10-05 ASSESSMENT — ENCOUNTER SYMPTOMS
ABDOMINAL PAIN: 1
APNEA: 0
TROUBLE SWALLOWING: 0
COLOR CHANGE: 0
ALLERGIC/IMMUNOLOGIC NEGATIVE: 1
EYE PAIN: 0
SHORTNESS OF BREATH: 0

## 2022-10-05 ASSESSMENT — PAIN DESCRIPTION - LOCATION: LOCATION: ABDOMEN

## 2022-10-05 ASSESSMENT — PAIN DESCRIPTION - ORIENTATION: ORIENTATION: LEFT

## 2022-10-05 ASSESSMENT — PAIN DESCRIPTION - DESCRIPTORS: DESCRIPTORS: ACHING

## 2022-10-05 ASSESSMENT — PAIN SCALES - GENERAL: PAINLEVEL_OUTOF10: 4

## 2022-10-05 ASSESSMENT — PAIN DESCRIPTION - PAIN TYPE: TYPE: ACUTE PAIN

## 2022-10-05 ASSESSMENT — PAIN - FUNCTIONAL ASSESSMENT: PAIN_FUNCTIONAL_ASSESSMENT: 0-10

## 2022-10-05 NOTE — ED NOTES
Returned. Son at bedside. Call light within reach. Aware we are waiting on results. No acute distress noted at this time.      Pam Grant RN  10/05/22 9605

## 2022-10-05 NOTE — ED PROVIDER NOTES
[facility] ED  eMERGENCYdEPARTMENT eNCOUnter      Pt Name: Jose Monreal  MRN: [id number]  [place] 1960  Date of evaluation: 10/5/2022  Provider:David Darylene Broody, PA-C    CHIEF COMPLAINT       Chief Complaint   Patient presents with    Abdominal Pain     LUQ pain. -N/V/D         HISTORY OF PRESENT ILLNESS  (Location/Symptom, Timing/Onset, Context/Setting, Quality, Duration, Modifying Factors, Severity.)   Jose Monreal is a 64 y.o. female who presents to the emergency department with a 2-day history of left upper quadrant abdomen and flank pain. Patient states that this pain is intermittent in nature and not associated with any nausea, vomiting or diarrhea. Patient states that movement does make the pain worse. Patient denies any radiation of pain into the chest or lower abdomen. Last bowel movement this morning and normal.    HPI    Nursing Notes were reviewed and I agree. REVIEW OF SYSTEMS    (2-9 systems for level 4, 10 or more for level 5)     Review of Systems   Constitutional:  Negative for diaphoresis and fever. HENT:  Negative for hearing loss and trouble swallowing. Eyes:  Negative for pain. Respiratory:  Negative for apnea and shortness of breath. Cardiovascular:  Negative for chest pain. Gastrointestinal:  Positive for abdominal pain. Endocrine: Negative. Genitourinary:  Positive for flank pain. Negative for hematuria. Musculoskeletal:  Negative for neck pain and neck stiffness. Skin:  Negative for color change. Allergic/Immunologic: Negative. Neurological:  Negative for dizziness and numbness. Hematological: Negative. Psychiatric/Behavioral: Negative. All other systems reviewed and are negative. Except as noted above the remainder of the review of systems was reviewed and negative.        PAST MEDICAL HISTORY     Past Medical History:   Diagnosis Date    Cancer ([facility] [address]     Cervical cancer ([facility] [address]     ETOH abuse          SURGICAL HISTORY     History reviewed. No pertinent surgical history. CURRENT MEDICATIONS       Previous Medications    AMLODIPINE (NORVASC) 5 MG TABLET    Take 1 tablet by mouth daily    AMMONIUM LACTATE (LAC-HYDRIN) 12 % LOTION    Apply topically as needed for Dry Skin Apply topically as needed. FLUCONAZOLE (DIFLUCAN) 150 MG TABLET    Take 1 tablet by mouth once a week    FOLIC ACID (FOLVITE) 1 MG TABLET    Take 1 tablet by mouth daily    MOMETASONE (ELOCON) 0.1 % CREAM    Apply 45 g topically daily    POTASSIUM CHLORIDE (KLOR-CON M) 20 MEQ EXTENDED RELEASE TABLET    Take 1 tablet by mouth daily    THIAMINE 100 MG TABLET    Take 1 tablet by mouth daily    VITAMIN D (CHOLECALCIFEROL) 50 MCG (2000 UT) TABS TABLET    Take 1 tablet by mouth daily       ALLERGIES     Ranitidine and Zantac [ranitidine hcl]    FAMILY HISTORY     History reviewed. No pertinent family history. 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     Now for LTC placement--ETOH dementia                                  SCREENINGS    Clear Coma Scale  Eye Opening: Spontaneous  Best Verbal Response: Oriented  Best Motor Response: Obeys commands  Clear Coma Scale Score: 15      PHYSICAL EXAM    (up to 7 forlevel 4, 8 or more for level 5)     ED Triage Vitals [10/05/22 1610]   BP Temp Temp Source Heart Rate Resp SpO2 Height Weight   (!) 144/95 98.3 °F (36.8 °C) Temporal (!) 104 16 96 % 5' (1.524 m) 81 lb (36.7 kg)       Physical Exam  Vitals and nursing note reviewed. Constitutional:       General: She is not in acute distress. Appearance: She is well-developed. She is not diaphoretic. HENT:      Head: Normocephalic and atraumatic. Mouth/Throat:      Mouth: Mucous membranes are moist.      Pharynx: No oropharyngeal exudate. Eyes:      General: No scleral icterus. Conjunctiva/sclera: Conjunctivae normal.      Pupils: Pupils are equal, round, and reactive to light. Neck:      Trachea: No tracheal deviation. Cardiovascular:      Rate and Rhythm: Normal rate. Heart sounds: Normal heart sounds. Pulmonary:      Effort: Pulmonary effort is normal. No respiratory distress. Breath sounds: Normal breath sounds. Abdominal:      General: Bowel sounds are normal. There is no distension. Palpations: Abdomen is soft. Musculoskeletal:         General: Normal range of motion. Cervical back: Normal range of motion and neck supple. No rigidity or tenderness. Skin:     General: Skin is warm and dry. Findings: No erythema or rash. Neurological:      Mental Status: She is alert and oriented to person, place, and time. Cranial Nerves: No cranial nerve deficit. Motor: No abnormal muscle tone. Psychiatric:         Behavior: Behavior normal.         Thought Content: Thought content normal.         Judgment: Judgment normal.         DIAGNOSTIC RESULTS     RADIOLOGY:   Non-plain film images such as CT, Ultrasound and MRI are read by the radiologist. Plain radiographic images are visualized and preliminarilyinterpreted by Carson Hanson PA-C with the below findings:      Interpretation per the Radiologist below, if available at the time of this note:    CT KIDNEY WO CONTRAST   Final Result   No evidence of obstructive uropathy. Wall thickening of the fundus of the stomach, recommend clinical correlation   for gastritis. Marked irregular wall thickening of the urinary bladder is seen. Extensive vascular calcifications are seen. Extensive degenerative bone changes are seen. Pleural fluid soft tissue mass visualized in the left lower lung field   recommend further evaluation with CT scan of the chest      RECOMMENDATIONS:   CT scan of the chest.             LABS:  Labs Reviewed   URINALYSIS WITH REFLEX TO CULTURE - Abnormal; Notable for the following components:       Result Value    Color, UA ORANGE (*)     Clarity, UA CLOUDY (*)     Ketones, Urine TRACE (*)     Blood, Urine TRACE (*)     Protein, UA TRACE (*)     Nitrite, Urine POSITIVE (*)     Leukocyte Esterase, Urine MODERATE (*)     All other components within normal limits   MICROSCOPIC URINALYSIS - Abnormal; Notable for the following components:    Bacteria, UA MANY (*)     WBC, UA  (*)     All other components within normal limits   CULTURE, URINE       All other labs were within normal range or not returnedas of this dictation. EMERGENCYDEPARTMENT COURSE and DIFFERENTIAL DIAGNOSIS/MDM:   Vitals:    Vitals:    10/05/22 1610   BP: (!) 144/95   Pulse: (!) 104   Resp: 16   Temp: 98.3 °F (36.8 °C)   TempSrc: Temporal   SpO2: 96%   Weight: 81 lb (36.7 kg)   Height: 5' (1.524 m)       REASSESSMENT        Patient refused all blood testing, stating \"I just want an x-ray to make sure it looks okay\". Patient was agreeable to a urinalysis to rule out UTI/pyelonephritis. Urinalysis does show urinary tract infection as well as CT scan revealing suspected gastritis. Discussed that the pleural mass on CT. Patient was referred to her primary care physician for further evaluation and follow-up. She will be treated with Protonix as well as antibiotics for her UTI. MDM      PROCEDURES:    Procedures      FINAL IMPRESSION      1. Acute cystitis without hematuria    2.  Acute superficial gastritis without hemorrhage          DISPOSITION/PLAN   DISPOSITION Decision To Discharge 10/05/2022 05:37:43 PM      PATIENT REFERRED TO:  Murray Mireles MD  [facility] [phone]    Call in 2 days      DISCHARGE MEDICATIONS:  New Prescriptions    CEPHALEXIN (KEFLEX) 500 MG CAPSULE    Take 1 capsule by mouth 2 times daily for 7 days    PANTOPRAZOLE (PROTONIX) 20 MG TABLET    Take 2 tablets by mouth daily    TRAMADOL (ULTRAM) 50 MG TABLET    Take 1 tablet by mouth every 6 hours as needed for Pain for up to 3 days. Intended supply: 3 days.  Take lowest dose possible to manage pain       (Please note that portions of this note were completed with a voice recognition program.  Efforts were made to edit the dictations but occasionally words are mis-transcribed.)    TIEN Ovalle PA-C  10/05/22 0546

## 2022-10-07 LAB
ORGANISM: ABNORMAL
URINE CULTURE, ROUTINE: ABNORMAL
URINE CULTURE, ROUTINE: ABNORMAL

## 2023-02-07 ENCOUNTER — HOSPITAL ENCOUNTER (EMERGENCY)
Age: 63
Discharge: HOME | End: 2023-02-08
Payer: COMMERCIAL

## 2023-02-07 DIAGNOSIS — F10.920 ACUTE ALCOHOLIC INTOXICATION WITHOUT COMPLICATION (HCC): Primary | ICD-10-CM

## 2023-02-07 PROCEDURE — 99283 EMERGENCY DEPT VISIT LOW MDM: CPT

## 2023-02-08 VITALS
OXYGEN SATURATION: 100 % | DIASTOLIC BLOOD PRESSURE: 75 MMHG | HEART RATE: 93 BPM | BODY MASS INDEX: 15.9 KG/M2 | TEMPERATURE: 98 F | HEIGHT: 60 IN | WEIGHT: 81 LBS | RESPIRATION RATE: 18 BRPM | SYSTOLIC BLOOD PRESSURE: 130 MMHG

## 2023-02-08 LAB
ACETAMINOPHEN LEVEL: <5 UG/ML (ref 10–30)
ALBUMIN SERPL-MCNC: 4.1 G/DL (ref 3.5–4.6)
ALP BLD-CCNC: 77 U/L (ref 40–130)
ALT SERPL-CCNC: 8 U/L (ref 0–33)
AMPHETAMINE SCREEN, URINE: NORMAL
ANION GAP SERPL CALCULATED.3IONS-SCNC: 12 MEQ/L (ref 9–15)
AST SERPL-CCNC: 21 U/L (ref 0–35)
BACTERIA: ABNORMAL /HPF
BARBITURATE SCREEN URINE: NORMAL
BASOPHILS ABSOLUTE: 0.1 K/UL (ref 0–0.2)
BASOPHILS RELATIVE PERCENT: 0.8 %
BENZODIAZEPINE SCREEN, URINE: NORMAL
BILIRUB SERPL-MCNC: <0.2 MG/DL (ref 0.2–0.7)
BILIRUBIN URINE: NEGATIVE
BLOOD, URINE: NEGATIVE
BUN BLDV-MCNC: 8 MG/DL (ref 8–23)
CALCIUM SERPL-MCNC: 9.1 MG/DL (ref 8.5–9.9)
CANNABINOID SCREEN URINE: NORMAL
CHLORIDE BLD-SCNC: 92 MEQ/L (ref 95–107)
CHOLESTEROL, TOTAL: 201 MG/DL (ref 0–199)
CLARITY: ABNORMAL
CO2: 24 MEQ/L (ref 20–31)
COCAINE METABOLITE SCREEN URINE: NORMAL
COLOR: YELLOW
CREAT SERPL-MCNC: 0.4 MG/DL (ref 0.5–0.9)
EOSINOPHILS ABSOLUTE: 0 K/UL (ref 0–0.7)
EOSINOPHILS RELATIVE PERCENT: 0.4 %
EPITHELIAL CELLS, UA: ABNORMAL /HPF (ref 0–5)
ETHANOL PERCENT: 0.19 G/DL
ETHANOL: 219 MG/DL (ref 0–0.08)
FENTANYL SCREEN, URINE: NORMAL
GFR SERPL CREATININE-BSD FRML MDRD: >60 ML/MIN/{1.73_M2}
GLOBULIN: 3.7 G/DL (ref 2.3–3.5)
GLUCOSE BLD-MCNC: 81 MG/DL (ref 70–99)
GLUCOSE URINE: NEGATIVE MG/DL
HCT VFR BLD CALC: 42.2 % (ref 37–47)
HDLC SERPL-MCNC: 78 MG/DL (ref 40–59)
HEMOGLOBIN: 14.1 G/DL (ref 12–16)
HYALINE CASTS: ABNORMAL /HPF (ref 0–5)
KETONES, URINE: ABNORMAL MG/DL
LACTIC ACID: 2.9 MMOL/L (ref 0.5–2.2)
LDL CHOLESTEROL CALCULATED: 109 MG/DL (ref 0–129)
LEUKOCYTE ESTERASE, URINE: ABNORMAL
LYMPHOCYTES ABSOLUTE: 2.2 K/UL (ref 1–4.8)
LYMPHOCYTES RELATIVE PERCENT: 28.3 %
Lab: NORMAL
MCH RBC QN AUTO: 31.6 PG (ref 27–31.3)
MCHC RBC AUTO-ENTMCNC: 33.4 % (ref 33–37)
MCV RBC AUTO: 94.6 FL (ref 79.4–94.8)
METHADONE SCREEN, URINE: NORMAL
MONOCYTES ABSOLUTE: 0.6 K/UL (ref 0.2–0.8)
MONOCYTES RELATIVE PERCENT: 7 %
NEUTROPHILS ABSOLUTE: 5 K/UL (ref 1.4–6.5)
NEUTROPHILS RELATIVE PERCENT: 63.5 %
NITRITE, URINE: NEGATIVE
OPIATE SCREEN URINE: NORMAL
OXYCODONE URINE: NORMAL
PDW BLD-RTO: 13.7 % (ref 11.5–14.5)
PH UA: 5.5 (ref 5–9)
PHENCYCLIDINE SCREEN URINE: NORMAL
PLATELET # BLD: 450 K/UL (ref 130–400)
POTASSIUM SERPL-SCNC: 4.6 MEQ/L (ref 3.4–4.9)
PROPOXYPHENE SCREEN: NORMAL
PROTEIN UA: NEGATIVE MG/DL
RBC # BLD: 4.46 M/UL (ref 4.2–5.4)
RBC UA: ABNORMAL /HPF (ref 0–5)
SALICYLATE, SERUM: <0.3 MG/DL (ref 15–30)
SARS-COV-2, NAAT: NOT DETECTED
SODIUM BLD-SCNC: 128 MEQ/L (ref 135–144)
SPECIFIC GRAVITY UA: 1.01 (ref 1–1.03)
TOTAL CK: 50 U/L (ref 0–170)
TOTAL PROTEIN: 7.8 G/DL (ref 6.3–8)
TRIGL SERPL-MCNC: 72 MG/DL (ref 0–150)
TSH REFLEX: 0.72 UIU/ML (ref 0.44–3.86)
URINE REFLEX TO CULTURE: ABNORMAL
UROBILINOGEN, URINE: 0.2 E.U./DL
WBC # BLD: 7.9 K/UL (ref 4.8–10.8)
WBC UA: ABNORMAL /HPF (ref 0–5)

## 2023-02-08 PROCEDURE — 80179 DRUG ASSAY SALICYLATE: CPT

## 2023-02-08 PROCEDURE — 82550 ASSAY OF CK (CPK): CPT

## 2023-02-08 PROCEDURE — 85025 COMPLETE CBC W/AUTO DIFF WBC: CPT

## 2023-02-08 PROCEDURE — 82077 ASSAY SPEC XCP UR&BREATH IA: CPT

## 2023-02-08 PROCEDURE — 84443 ASSAY THYROID STIM HORMONE: CPT

## 2023-02-08 PROCEDURE — 80061 LIPID PANEL: CPT

## 2023-02-08 PROCEDURE — 80053 COMPREHEN METABOLIC PANEL: CPT

## 2023-02-08 PROCEDURE — 87635 SARS-COV-2 COVID-19 AMP PRB: CPT

## 2023-02-08 PROCEDURE — 80307 DRUG TEST PRSMV CHEM ANLYZR: CPT

## 2023-02-08 PROCEDURE — 81001 URINALYSIS AUTO W/SCOPE: CPT

## 2023-02-08 PROCEDURE — 36415 COLL VENOUS BLD VENIPUNCTURE: CPT

## 2023-02-08 PROCEDURE — 80143 DRUG ASSAY ACETAMINOPHEN: CPT

## 2023-02-08 PROCEDURE — 83605 ASSAY OF LACTIC ACID: CPT

## 2023-02-08 ASSESSMENT — ENCOUNTER SYMPTOMS
NAUSEA: 0
CHEST TIGHTNESS: 0
DIARRHEA: 0
SORE THROAT: 0
EYE PAIN: 0
BACK PAIN: 0
SHORTNESS OF BREATH: 0

## 2023-02-08 ASSESSMENT — PAIN - FUNCTIONAL ASSESSMENT
PAIN_FUNCTIONAL_ASSESSMENT: NONE - DENIES PAIN
PAIN_FUNCTIONAL_ASSESSMENT: NONE - DENIES PAIN

## 2023-02-08 NOTE — ED NOTES
Attempted to call patients son 2nd time. No answer. Voicemail left.       Ivan Beverly, RN  02/08/23 9236

## 2023-02-08 NOTE — ED NOTES
Patient is refusing to let this RN draw any blood or swab her for covid, she also will not provide a urine sample, when I asked why she said theres nothing wrong with her and shes calling for a ride to go home.  Patient is resting in bed at this time side rales are up x 2, bed is locked call light is with in reach provider made aware     Radha Jay RN  02/08/23 0041
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
## 2023-02-08 NOTE — ED NOTES
Attempted to call patients son Ilsa Floyd called for ride home. No answer.       Chari Dubin, CATHERINE  02/08/23 5925

## 2024-10-18 NOTE — PROGRESS NOTES
Home Hospitalist Progress Note      PCP: Allison Hooper MD    Date of Admission: 3/11/2021    Chief Complaint:    Chief Complaint   Patient presents with    Fall     Subjective:  Patient states she is feeling better and wants to go home. Recommended SNF and patient is adamant she will only go home. She is orientated but has flight of thoughts. She admits that she drinks 2-3 24oz beers a day; states she will cut back but cannot quit at this time. 12 point ROS negative other than mentioned above     Medications:  Reviewed    Infusion Medications    dextrose       Scheduled Medications    sodium chloride flush  10 mL Intravenous 2 times per day    enoxaparin  30 mg Subcutaneous Daily    insulin lispro  0-6 Units Subcutaneous TID WC    insulin lispro  0-3 Units Subcutaneous Nightly    amLODIPine  2.5 mg Oral Daily     PRN Meds: sodium chloride flush, promethazine **OR** ondansetron, polyethylene glycol, acetaminophen **OR** acetaminophen, glucose, dextrose, glucagon (rDNA), dextrose      Intake/Output Summary (Last 24 hours) at 3/13/2021 1425  Last data filed at 3/13/2021 0600  Gross per 24 hour   Intake 1450 ml   Output --   Net 1450 ml     Exam:    BP (!) 165/85   Pulse 65   Temp 97.3 °F (36.3 °C) (Oral)   Resp 18   Ht 5' (1.524 m)   Wt 82 lb (37.2 kg)   LMP  (LMP Unknown)   SpO2 100%   BMI 16.01 kg/m²     General appearance: No apparent distress, appears stated age and cooperative. HEENT:  Conjunctivae/corneas clear. Neck:  Trachea midline. Respiratory:  Normal respiratory effort. Clear to auscultation  Cardiovascular: Regular rate and rhythm   Abdomen: Soft, non-tender, non-distended with normal bowel sounds. Musculoskeletal: No clubbing, cyanosis or edema bilaterally.    Neuro: Non Focal.  Capillary Refill: Brisk,< 3 seconds   Peripheral Pulses: +2 palpable, equal bilaterally     Labs:   Recent Labs     03/12/21  0215   WBC 9.4   HGB 12.9   HCT 38.0   *     Recent Labs 03/12/21 0215 03/13/21  1026   * 130*   K 3.9 3.5   CL 92* 96   CO2 25 26   BUN 6* 6*   CREATININE 0.30* 0.50   CALCIUM 9.2 8.9     Recent Labs     03/12/21 0215   AST 31   ALT 20   BILITOT 0.3   ALKPHOS 96     No results for input(s): INR in the last 72 hours. Recent Labs     03/12/21 0215   CKTOTAL 344*       Urinalysis:      Lab Results   Component Value Date    NITRU Negative 03/12/2021    BLOODU Negative 03/12/2021    SPECGRAV 1.006 03/12/2021    GLUCOSEU Negative 03/12/2021       Radiology:  RADIOLOGY REPORT   Final Result      CT HEAD WO CONTRAST   Final Result      CT CERVICAL SPINE WO CONTRAST   Final Result      No fracture. Moderate to marked degenerative change cervical spine. All CT scans at this facility use dose modulation, iterative reconstruction, and/or weight based dosing when appropriate to reduce radiation dose to as low as reasonably achievable.                   Assessment/Plan:    #Hyponatremia; beer potomania    - Discussed with patient and she states she cannot quit drinking but will try to cut back    #HTN    - Continue norvasc; increase dose    #Alcoholism; repeated falls    - Not withdrawing at this time     - start thiamine and b12; add on b12 to morning labs    - Monitor ciwa    - likely the source of her falls; she denies LOC, palpitations, light headedness; states the falls are usually her tripping    #Flight of thoughts    - Daughter would like patient placed but the patient insists she is at baseline and is ok to go home; will consult psych for capacity to make decisions; if she is capable of making her own decisions then may need to d/c home    Active Hospital Problems    Diagnosis Date Noted    Dementia ([facility] [address] [F03.90] 03/12/2021    Poor compliance with medication [Z91.14] 03/12/2021    Severe malnutrition ([facility] [address] [E43] 71/37/7634    Metabolic encephalopathy [Y81.65] 02/10/2021    HTN (hypertension) [I10] 02/07/2021    Hyponatremia [E87.1] 02/06/2021 Additional work up or/and treatment plan may be added today or then after based on clinical progression. I am managing a portion of pt care. Some medical issues are handled by other specialists. Additional work up and treatment should be done in out pt setting by pt PCP and other out pt providers. In addition to examining and evaluating pt, I spent additional time explaining care, normal and abnormal findings, and treatment plan. All of pt questions were answered. Counseling, diet and education were  provided. Case will be discussed with nursing staff when appropriate. Family will be updated if and when appropriate.       Diet: DIET GENERAL;    Code Status: Full Code    PT/OT Eval     Electronically signed by Cheryl Deal MD on 3/13/2021 at 2:25 PM

## 2025-06-06 ENCOUNTER — HOSPITAL ENCOUNTER (EMERGENCY)
Age: 65
Discharge: HOME | End: 2025-06-07
Payer: COMMERCIAL

## 2025-06-06 VITALS
OXYGEN SATURATION: 97 % | BODY MASS INDEX: 16.85 KG/M2 | HEART RATE: 91 BPM | WEIGHT: 83.6 LBS | HEIGHT: 59 IN | SYSTOLIC BLOOD PRESSURE: 132 MMHG | RESPIRATION RATE: 16 BRPM | TEMPERATURE: 97.6 F | DIASTOLIC BLOOD PRESSURE: 82 MMHG

## 2025-06-06 DIAGNOSIS — F10.90 ALCOHOL USE DISORDER: Primary | ICD-10-CM

## 2025-06-06 DIAGNOSIS — N30.00 ACUTE CYSTITIS WITHOUT HEMATURIA: ICD-10-CM

## 2025-06-06 PROCEDURE — 99284 EMERGENCY DEPT VISIT MOD MDM: CPT

## 2025-06-06 PROCEDURE — 93005 ELECTROCARDIOGRAM TRACING: CPT

## 2025-06-06 RX ORDER — LANOLIN ALCOHOL/MO/W.PET/CERES
100 CREAM (GRAM) TOPICAL ONCE
Status: COMPLETED | OUTPATIENT
Start: 2025-06-06 | End: 2025-06-07

## 2025-06-06 RX ORDER — FOLIC ACID 1 MG/1
1 TABLET ORAL DAILY
Status: DISCONTINUED | OUTPATIENT
Start: 2025-06-06 | End: 2025-06-07 | Stop reason: HOSPADM

## 2025-06-06 ASSESSMENT — PAIN DESCRIPTION - DESCRIPTORS: DESCRIPTORS: ACHING

## 2025-06-06 ASSESSMENT — PAIN DESCRIPTION - PAIN TYPE: TYPE: ACUTE PAIN

## 2025-06-06 ASSESSMENT — LIFESTYLE VARIABLES
HOW MANY STANDARD DRINKS CONTAINING ALCOHOL DO YOU HAVE ON A TYPICAL DAY: 1 OR 2
HOW OFTEN DO YOU HAVE A DRINK CONTAINING ALCOHOL: 2-3 TIMES A WEEK

## 2025-06-06 ASSESSMENT — PAIN DESCRIPTION - LOCATION: LOCATION: FACE

## 2025-06-06 ASSESSMENT — PAIN - FUNCTIONAL ASSESSMENT: PAIN_FUNCTIONAL_ASSESSMENT: 0-10

## 2025-06-06 ASSESSMENT — PAIN SCALES - GENERAL: PAINLEVEL_OUTOF10: 7

## 2025-06-06 ASSESSMENT — PAIN DESCRIPTION - ORIENTATION: ORIENTATION: LEFT

## 2025-06-07 LAB
ALBUMIN SERPL-MCNC: 3.8 G/DL (ref 3.5–4.6)
ALP SERPL-CCNC: 105 U/L (ref 40–130)
ALT SERPL-CCNC: 11 U/L (ref 0–33)
AMPHET UR QL SCN: NORMAL
ANION GAP SERPL CALCULATED.3IONS-SCNC: 11 MEQ/L (ref 9–15)
APAP SERPL-MCNC: <5 UG/ML (ref 10–30)
AST SERPL-CCNC: 26 U/L (ref 0–35)
BACTERIA URNS QL MICRO: ABNORMAL /HPF
BARBITURATES UR QL SCN: NORMAL
BASOPHILS # BLD: 0.1 K/UL (ref 0–0.2)
BASOPHILS NFR BLD: 0.7 %
BENZODIAZ UR QL SCN: NORMAL
BILIRUB SERPL-MCNC: 0.3 MG/DL (ref 0.2–0.7)
BILIRUB UR QL STRIP: NEGATIVE
BUN SERPL-MCNC: 7 MG/DL (ref 8–23)
CALCIUM SERPL-MCNC: 8.9 MG/DL (ref 8.5–9.9)
CANNABINOIDS UR QL SCN: NORMAL
CHLORIDE SERPL-SCNC: 100 MEQ/L (ref 95–107)
CHOLEST SERPL-MCNC: 195 MG/DL (ref 0–199)
CK SERPL-CCNC: 307 U/L (ref 0–170)
CLARITY UR: CLEAR
CO2 SERPL-SCNC: 25 MEQ/L (ref 20–31)
COCAINE UR QL SCN: NORMAL
COLOR UR: YELLOW
CREAT SERPL-MCNC: 0.49 MG/DL (ref 0.5–0.9)
DRUG SCREEN COMMENT UR-IMP: NORMAL
EOSINOPHIL # BLD: 0.1 K/UL (ref 0–0.7)
EOSINOPHIL NFR BLD: 1.8 %
EPI CELLS #/AREA URNS AUTO: ABNORMAL /HPF (ref 0–5)
ERYTHROCYTE [DISTWIDTH] IN BLOOD BY AUTOMATED COUNT: 12.6 % (ref 11.5–14.5)
ETHANOL PERCENT: NORMAL G/DL
ETHANOLAMINE SERPL-MCNC: <10 MG/DL (ref 0–0.08)
FENTANYL SCREEN, URINE: NORMAL
GLOBULIN SER CALC-MCNC: 4 G/DL (ref 2.3–3.5)
GLUCOSE SERPL-MCNC: 93 MG/DL (ref 70–99)
GLUCOSE UR STRIP-MCNC: NEGATIVE MG/DL
HCT VFR BLD AUTO: 38.4 % (ref 37–47)
HDLC SERPL-MCNC: 82 MG/DL (ref 40–59)
HGB BLD-MCNC: 13.5 G/DL (ref 12–16)
HGB UR QL STRIP: NEGATIVE
HYALINE CASTS #/AREA URNS AUTO: ABNORMAL /HPF (ref 0–5)
KETONES UR STRIP-MCNC: NEGATIVE MG/DL
LDLC SERPL CALC-MCNC: 101 MG/DL (ref 0–129)
LEUKOCYTE ESTERASE UR QL STRIP: ABNORMAL
LYMPHOCYTES # BLD: 2.3 K/UL (ref 1–4.8)
LYMPHOCYTES NFR BLD: 34 %
MCH RBC QN AUTO: 32.8 PG (ref 27–31.3)
MCHC RBC AUTO-ENTMCNC: 35.2 % (ref 33–37)
MCV RBC AUTO: 93.2 FL (ref 79.4–94.8)
METHADONE UR QL SCN: NORMAL
MONOCYTES # BLD: 1 K/UL (ref 0.2–0.8)
MONOCYTES NFR BLD: 14.1 %
NEUTROPHILS # BLD: 3.4 K/UL (ref 1.4–6.5)
NEUTS SEG NFR BLD: 49.3 %
NITRITE UR QL STRIP: NEGATIVE
OPIATES UR QL SCN: NORMAL
OXYCODONE UR QL SCN: NORMAL
PCP UR QL SCN: NORMAL
PH UR STRIP: 6 [PH] (ref 5–9)
PLATELET # BLD AUTO: 469 K/UL (ref 130–400)
POTASSIUM SERPL-SCNC: 4.2 MEQ/L (ref 3.4–4.9)
PROPOXYPH UR QL SCN: NORMAL
PROT SERPL-MCNC: 7.8 G/DL (ref 6.3–8)
PROT UR STRIP-MCNC: NEGATIVE MG/DL
RBC # BLD AUTO: 4.12 M/UL (ref 4.2–5.4)
RBC #/AREA URNS AUTO: ABNORMAL /HPF (ref 0–5)
SALICYLATES SERPL-MCNC: <0.3 MG/DL (ref 15–30)
SODIUM SERPL-SCNC: 136 MEQ/L (ref 135–144)
SP GR UR STRIP: 1.01 (ref 1–1.03)
TRIGL SERPL-MCNC: 61 MG/DL (ref 0–150)
TSH SERPL-MCNC: 1.06 UIU/ML (ref 0.44–3.86)
URINE REFLEX TO CULTURE: YES
UROBILINOGEN UR STRIP-ACNC: 0.2 E.U./DL
WBC # BLD AUTO: 6.8 K/UL (ref 4.8–10.8)
WBC #/AREA URNS AUTO: ABNORMAL /HPF (ref 0–5)

## 2025-06-07 PROCEDURE — 82550 ASSAY OF CK (CPK): CPT

## 2025-06-07 PROCEDURE — 80143 DRUG ASSAY ACETAMINOPHEN: CPT

## 2025-06-07 PROCEDURE — 6370000000 HC RX 637 (ALT 250 FOR IP): Performed by: PHYSICIAN ASSISTANT

## 2025-06-07 PROCEDURE — 80061 LIPID PANEL: CPT

## 2025-06-07 PROCEDURE — 83036 HEMOGLOBIN GLYCOSYLATED A1C: CPT

## 2025-06-07 PROCEDURE — 87186 SC STD MICRODIL/AGAR DIL: CPT

## 2025-06-07 PROCEDURE — 80307 DRUG TEST PRSMV CHEM ANLYZR: CPT

## 2025-06-07 PROCEDURE — 84443 ASSAY THYROID STIM HORMONE: CPT

## 2025-06-07 PROCEDURE — 81001 URINALYSIS AUTO W/SCOPE: CPT

## 2025-06-07 PROCEDURE — 80053 COMPREHEN METABOLIC PANEL: CPT

## 2025-06-07 PROCEDURE — 80179 DRUG ASSAY SALICYLATE: CPT

## 2025-06-07 PROCEDURE — 90792 PSYCH DIAG EVAL W/MED SRVCS: CPT

## 2025-06-07 PROCEDURE — 85025 COMPLETE CBC W/AUTO DIFF WBC: CPT

## 2025-06-07 PROCEDURE — 87086 URINE CULTURE/COLONY COUNT: CPT

## 2025-06-07 PROCEDURE — 36415 COLL VENOUS BLD VENIPUNCTURE: CPT

## 2025-06-07 PROCEDURE — 87077 CULTURE AEROBIC IDENTIFY: CPT

## 2025-06-07 PROCEDURE — 82077 ASSAY SPEC XCP UR&BREATH IA: CPT

## 2025-06-07 RX ORDER — CEPHALEXIN 500 MG/1
500 CAPSULE ORAL 2 TIMES DAILY
Qty: 14 CAPSULE | Refills: 0 | Status: SHIPPED | OUTPATIENT
Start: 2025-06-07 | End: 2025-06-14

## 2025-06-07 RX ORDER — HYDROMORPHONE HYDROCHLORIDE 1 MG/ML
2 INJECTION, SOLUTION INTRAMUSCULAR; INTRAVENOUS; SUBCUTANEOUS ONCE
Status: DISCONTINUED | OUTPATIENT
Start: 2025-06-07 | End: 2025-06-07

## 2025-06-07 RX ADMIN — Medication 100 MG: at 00:01

## 2025-06-07 RX ADMIN — FOLIC ACID 1 MG: 1 TABLET ORAL at 00:01

## 2025-06-07 NOTE — ED NOTES
Spoke to pt's son Walter and he can come in the am to get pt. Pt's son stated he cannot drive at night but would be here as soon as its daylight.

## 2025-06-07 NOTE — DISCHARGE INSTRUCTIONS
[place] Transportation Resources*  (Call [employer]/211 if need more resources.)   [address] [facility]:  What they offer:  Transportation to [facility] resident's, 24 hrs. a day, 7 days a week. You must call for pricing and private pay by credit card.   Phone Number: [phone]  Website: https://www.[facility] HospitalQuality Technology Services.Tenantrex/transportationoptions  [facility] Transportation:  What they offer: Personal care service to airports, corporate concerts, shopping malls and more. Hours are based on customer's needs and cost depends on riders' location and where they are going.  Phone Number: [phone]  Website: http://www.[employer]  [facility] Transportation:  What they offer: An unassisted curb to curb service for [facility] older adult residents. Rides to [facility] activities, grocery shopping, pharmacies, banking & beauty salons in [facility], area malls and voting.  Cost is free.   Phone Number: [phone]  Website: https://www.ATOMOO/159/Senior-Center  [facility] - Community Transportation:  What they offer:  Free transportation on Monday's, Tuesday's Wednesday's and Thursdays for local shopping, banking, and medical appointments to [place] residents.   Phone Number: [phone]  Website: https://www.[employer]house[employer]/city-services/community-transportation  [facility]- Office on aging:  What they offer:   Transportation for [facility] residents ages 60 and over to grocery & drug stores, hair salons, doctors, and dentist appointments. Transportation on Monday, Tuesday, and Saturday from 9am to noon, Thursday, and Friday from 9 am to 4 pm. Wednesday is social trips only. Local trips are $0.50 each way and social trips are $8.00 to $30.00 depending on the trip.   Phone Number: 986.800.4511 EXT 5  Website: https://www.Swivel/Faq.aspx?QID=70  [employer]:  What they offer:  Transportation for aging and disabled individuals for dialysis, medical &  (or of another origin) and drop you off at a medical appointment, work, or any destination in [facility].  Fees may apply.  Reservations are required 2 to 14 days in advance of your trip. You can make reservations by calling [facility]'s Scheduling Office at [phone]   Phone : [phone]  Website: https://www.[facility]/286/Service-Options   [employer] - Financial Resources*  (Call United Way/211 if need more resources.)  UTILITY:         [employer]/211:  What they offer: Help find lower cost options for phone or internet as well as help paying utility bills.   Phone Number: 211  Website: https://wwwLonely Sock/get-help/utilities-expenses   [employer]  What they offer:  Assistance with utilities  Phone:  290.809.2931    [facility] Community Action   What they Offer: Assistance with utilities through the Home Energy Assistance Program (HEAP) and the Winter Crisis Program (WCP), also sometimes called Emergency HEAP (E-HEAP).  These programs are designed to help income-eligible consumers with winter heating costs.  Also assists with the application for Percentage of Income Payment Plan (PIPP) which allows eligible residents to reduce their energy bills to a percentage of their income and avoid crisis situations.  Phone: 212-217-5860  Website: https://www.[employer]/    [employer] [place]  What they Offer: Assistance with utilities  Phone: 407.204.7396  Website: https://[facility] CustomsCleveland Clinic Martin North Hospitaliance.org/    [employer]   What they Offer: Assistance with utilities and rent   Phone: 915.803.1822   Website: www.[facility].org   Address:  [address], [place] [address]      [facility] Assistance Network (AN)   What they Offer: Provides the residents of [facility], with year-round access to emergency financial assistance for rent, utilities, or other emergency basic needs--at a single site geographically located in your neighborhood of residence   Website: AN Online Application
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
## 2025-06-07 NOTE — ED NOTES
Patient escorted to discharge doors and all paperwork reviewed with patient and her son Walter. Verbalized understanding of discharge instructions. Patient denies SI, HI and A/V hallucinations. Transported home per son.

## 2025-06-07 NOTE — ED NOTES
Patient changed into psych safe clothing. .   Skin and contraband check performed.   Contraband check negative at this time.   Lab called to draw blood.   Pt unable to urinate at this time.
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
## 2025-06-07 NOTE — ED NOTES
All discharge paperwork and referral reviewed with patient. Copy of safety plan provided to patient and [facility] same day access and [facility] [facility] referral provided to patient. All belongings returned.    Pain medications as prescribed by MD.  Follow up with MD as requested, call office for appt.  You may resume any/all preop medications/supplements as before.  Keep right arm splint dry, elevate arm as much as possible.
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
## 2025-06-07 NOTE — CARE COORDINATION
Met with pt at the bedside re no PCP >> 2 yrs rvd PCP list and made new pt appt for her with Dr. Martinez for 6/10/25 --10:30. Pictorial and contact information provided.    I also provided community resources on housing, financial, food and transportation. I always provided Coordinated Entry contact information for [facility] if needed. Pt plans on returning w sister and son is picking her up, will be seeking housing in near future.     Jermaine ALEXANDER provided [facility] information for [facility].     Pt does not drive but son does, she also was informed [employer] also provides by calling number on ins card.    Electronically signed by Bekah Nevarez, RN, BSN on 6/7/2025 at 1:22 PM

## 2025-06-08 LAB
BACTERIA UR CULT: NORMAL
ESTIMATED AVERAGE GLUCOSE: 105 MG/DL
HBA1C MFR BLD: 5.3 % (ref 4–6)

## 2025-06-09 LAB
BACTERIA UR CULT: ABNORMAL
BACTERIA UR CULT: ABNORMAL
EKG ATRIAL RATE: 64 BPM
EKG DIAGNOSIS: NORMAL
EKG P AXIS: 72 DEGREES
EKG P-R INTERVAL: 160 MS
EKG Q-T INTERVAL: 428 MS
EKG QRS DURATION: 80 MS
EKG QTC CALCULATION (BAZETT): 441 MS
EKG R AXIS: 61 DEGREES
EKG T AXIS: 41 DEGREES
EKG VENTRICULAR RATE: 64 BPM
ORGANISM: ABNORMAL